# Patient Record
Sex: MALE | Race: WHITE | URBAN - METROPOLITAN AREA
[De-identification: names, ages, dates, MRNs, and addresses within clinical notes are randomized per-mention and may not be internally consistent; named-entity substitution may affect disease eponyms.]

---

## 2017-01-12 ENCOUNTER — APPOINTMENT (RX ONLY)
Dept: URBAN - METROPOLITAN AREA CLINIC 24 | Facility: CLINIC | Age: 67
Setting detail: DERMATOLOGY
End: 2017-01-12

## 2017-01-12 DIAGNOSIS — L82.1 OTHER SEBORRHEIC KERATOSIS: ICD-10-CM

## 2017-01-12 DIAGNOSIS — L57.0 ACTINIC KERATOSIS: ICD-10-CM

## 2017-01-12 DIAGNOSIS — Z85.828 PERSONAL HISTORY OF OTHER MALIGNANT NEOPLASM OF SKIN: ICD-10-CM

## 2017-01-12 PROCEDURE — 99212 OFFICE O/P EST SF 10 MIN: CPT | Mod: 25

## 2017-01-12 PROCEDURE — ? COUNSELING

## 2017-01-12 PROCEDURE — ? LIQUID NITROGEN

## 2017-01-12 PROCEDURE — 17004 DESTROY PREMAL LESIONS 15/>: CPT

## 2017-01-12 ASSESSMENT — LOCATION DETAILED DESCRIPTION DERM
LOCATION DETAILED: LEFT DISTAL DORSAL FOREARM
LOCATION DETAILED: RIGHT CENTRAL ZYGOMA
LOCATION DETAILED: LEFT SUPERIOR PREAURICULAR CHEEK
LOCATION DETAILED: RIGHT DISTAL RADIAL DORSAL FOREARM
LOCATION DETAILED: RIGHT LATERAL FOREHEAD
LOCATION DETAILED: LEFT SUPERIOR CRUS OF ANTIHELIX
LOCATION DETAILED: LEFT DORSAL INDEX METACARPOPHALANGEAL JOINT
LOCATION DETAILED: RIGHT SUPERIOR FOREHEAD
LOCATION DETAILED: RIGHT CENTRAL TEMPLE
LOCATION DETAILED: LEFT SUPERIOR FOREHEAD
LOCATION DETAILED: RIGHT SUPERIOR LATERAL NECK
LOCATION DETAILED: LEFT PROXIMAL DORSAL FOREARM
LOCATION DETAILED: LEFT MEDIAL FOREHEAD
LOCATION DETAILED: RIGHT PROXIMAL DORSAL FOREARM
LOCATION DETAILED: RIGHT FOREHEAD
LOCATION DETAILED: RIGHT SUPERIOR HELIX
LOCATION DETAILED: RIGHT MEDIAL INFERIOR CHEST
LOCATION DETAILED: LEFT INFERIOR FOREHEAD
LOCATION DETAILED: NASAL DORSUM
LOCATION DETAILED: LEFT SUPERIOR LATERAL MALAR CHEEK
LOCATION DETAILED: RIGHT RADIAL DORSAL HAND

## 2017-01-12 ASSESSMENT — LOCATION ZONE DERM
LOCATION ZONE: ARM
LOCATION ZONE: TRUNK
LOCATION ZONE: NOSE
LOCATION ZONE: HAND
LOCATION ZONE: NECK
LOCATION ZONE: EAR
LOCATION ZONE: FACE

## 2017-01-12 ASSESSMENT — LOCATION SIMPLE DESCRIPTION DERM
LOCATION SIMPLE: RIGHT ZYGOMA
LOCATION SIMPLE: LEFT FOREARM
LOCATION SIMPLE: LEFT FOREHEAD
LOCATION SIMPLE: LEFT CHEEK
LOCATION SIMPLE: RIGHT HAND
LOCATION SIMPLE: LEFT HAND
LOCATION SIMPLE: RIGHT EAR
LOCATION SIMPLE: RIGHT FOREHEAD
LOCATION SIMPLE: NOSE
LOCATION SIMPLE: NECK
LOCATION SIMPLE: RIGHT FOREARM
LOCATION SIMPLE: LEFT EAR
LOCATION SIMPLE: CHEST
LOCATION SIMPLE: RIGHT TEMPLE

## 2017-01-12 NOTE — PROCEDURE: LIQUID NITROGEN
Duration Of Freeze Thaw-Cycle (Seconds): 0
Post-Care Instructions: I reviewed with the patient in detail post-care instructions. Patient is to wear sunprotection, and avoid picking at any of the treated lesions. Pt may apply Vaseline to crusted or scabbing areas.
Detail Level: Detailed
Render Post-Care Instructions In Note?: no
Consent: The patient's consent was obtained including but not limited to risks of crusting, scabbing, blistering, scarring, darker or lighter pigmentary change, recurrence, incomplete removal and infection.

## 2017-01-12 NOTE — PROCEDURE: MIPS QUALITY
Quality 111:Pneumonia Vaccination Status For Older Adults: Pneumococcal Vaccination Previously Received
Quality 130: Documentation Of Current Medications In The Medical Record: Current Medications Documented
Detail Level: Detailed
Quality 110: Preventive Care And Screening: Influenza Immunization: Influenza Immunization previously received during influenza season

## 2017-06-26 ENCOUNTER — HOSPITAL ENCOUNTER (OUTPATIENT)
Dept: ULTRASOUND IMAGING | Age: 67
Discharge: HOME | End: 2017-06-26
Attending: FAMILY MEDICINE
Payer: MEDICARE

## 2017-06-26 DIAGNOSIS — N50.89 TESTICULAR MASS: ICD-10-CM

## 2017-06-26 PROCEDURE — 76870 US EXAM SCROTUM: CPT

## 2017-08-03 ENCOUNTER — APPOINTMENT (RX ONLY)
Dept: URBAN - METROPOLITAN AREA CLINIC 24 | Facility: CLINIC | Age: 67
Setting detail: DERMATOLOGY
End: 2017-08-03

## 2017-08-03 DIAGNOSIS — L82.1 OTHER SEBORRHEIC KERATOSIS: ICD-10-CM

## 2017-08-03 DIAGNOSIS — L57.0 ACTINIC KERATOSIS: ICD-10-CM

## 2017-08-03 DIAGNOSIS — L81.4 OTHER MELANIN HYPERPIGMENTATION: ICD-10-CM

## 2017-08-03 DIAGNOSIS — D18.0 HEMANGIOMA: ICD-10-CM

## 2017-08-03 DIAGNOSIS — Z85.828 PERSONAL HISTORY OF OTHER MALIGNANT NEOPLASM OF SKIN: ICD-10-CM

## 2017-08-03 PROBLEM — D18.01 HEMANGIOMA OF SKIN AND SUBCUTANEOUS TISSUE: Status: ACTIVE | Noted: 2017-08-03

## 2017-08-03 PROCEDURE — 99213 OFFICE O/P EST LOW 20 MIN: CPT | Mod: 25

## 2017-08-03 PROCEDURE — ? LIQUID NITROGEN

## 2017-08-03 PROCEDURE — ? COUNSELING

## 2017-08-03 PROCEDURE — 17004 DESTROY PREMAL LESIONS 15/>: CPT

## 2017-08-03 ASSESSMENT — LOCATION SIMPLE DESCRIPTION DERM
LOCATION SIMPLE: SUPERIOR FOREHEAD
LOCATION SIMPLE: LEFT SHOULDER
LOCATION SIMPLE: CHEST
LOCATION SIMPLE: POSTERIOR SCALP
LOCATION SIMPLE: LEFT TEMPLE
LOCATION SIMPLE: LEFT EAR
LOCATION SIMPLE: RIGHT SHOULDER
LOCATION SIMPLE: SCALP
LOCATION SIMPLE: RIGHT UPPER BACK
LOCATION SIMPLE: NOSE
LOCATION SIMPLE: LEFT UPPER BACK
LOCATION SIMPLE: RIGHT FOREHEAD
LOCATION SIMPLE: LEFT OCCIPITAL SCALP
LOCATION SIMPLE: RIGHT CHEEK
LOCATION SIMPLE: LEFT ZYGOMA
LOCATION SIMPLE: LEFT FOREHEAD
LOCATION SIMPLE: ABDOMEN
LOCATION SIMPLE: RIGHT ZYGOMA
LOCATION SIMPLE: NECK
LOCATION SIMPLE: LEFT CHEEK

## 2017-08-03 ASSESSMENT — LOCATION ZONE DERM
LOCATION ZONE: NECK
LOCATION ZONE: SCALP
LOCATION ZONE: NOSE
LOCATION ZONE: EAR
LOCATION ZONE: TRUNK
LOCATION ZONE: FACE
LOCATION ZONE: ARM

## 2017-08-03 ASSESSMENT — LOCATION DETAILED DESCRIPTION DERM
LOCATION DETAILED: LEFT SUPERIOR HELIX
LOCATION DETAILED: LEFT CENTRAL ZYGOMA
LOCATION DETAILED: SUPERIOR MID FOREHEAD
LOCATION DETAILED: LEFT INFERIOR MEDIAL UPPER BACK
LOCATION DETAILED: RIGHT INFERIOR MEDIAL FOREHEAD
LOCATION DETAILED: EPIGASTRIC SKIN
LOCATION DETAILED: LEFT SUPERIOR OCCIPITAL SCALP
LOCATION DETAILED: LEFT POSTERIOR SHOULDER
LOCATION DETAILED: NASAL DORSUM
LOCATION DETAILED: RIGHT MEDIAL ZYGOMA
LOCATION DETAILED: RIGHT SUPERIOR FRONTAL SCALP
LOCATION DETAILED: RIGHT SUPERIOR OCCIPITAL SCALP
LOCATION DETAILED: RIGHT MID-UPPER BACK
LOCATION DETAILED: RIGHT SUPERIOR CENTRAL MALAR CHEEK
LOCATION DETAILED: POSTERIOR MID-PARIETAL SCALP
LOCATION DETAILED: LEFT MID PREAURICULAR CHEEK
LOCATION DETAILED: RIGHT SUPERIOR FOREHEAD
LOCATION DETAILED: RIGHT SUPERIOR LATERAL NECK
LOCATION DETAILED: RIGHT POSTERIOR SHOULDER
LOCATION DETAILED: LEFT SUPERIOR FOREHEAD
LOCATION DETAILED: LEFT MEDIAL TEMPLE
LOCATION DETAILED: RIGHT SUPERIOR PREAURICULAR CHEEK
LOCATION DETAILED: LEFT INFERIOR CENTRAL MALAR CHEEK
LOCATION DETAILED: RIGHT MEDIAL INFERIOR CHEST
LOCATION DETAILED: RIGHT LATERAL ZYGOMA
LOCATION DETAILED: LEFT SUPERIOR PARIETAL SCALP
LOCATION DETAILED: LEFT SUPERIOR MEDIAL FOREHEAD
LOCATION DETAILED: LEFT CENTRAL MALAR CHEEK

## 2018-02-13 ENCOUNTER — APPOINTMENT (RX ONLY)
Dept: URBAN - METROPOLITAN AREA CLINIC 24 | Facility: CLINIC | Age: 68
Setting detail: DERMATOLOGY
End: 2018-02-13

## 2018-02-13 DIAGNOSIS — L82.1 OTHER SEBORRHEIC KERATOSIS: ICD-10-CM

## 2018-02-13 DIAGNOSIS — L50.3 DERMATOGRAPHIC URTICARIA: ICD-10-CM

## 2018-02-13 DIAGNOSIS — D485 NEOPLASM OF UNCERTAIN BEHAVIOR OF SKIN: ICD-10-CM

## 2018-02-13 DIAGNOSIS — L81.4 OTHER MELANIN HYPERPIGMENTATION: ICD-10-CM

## 2018-02-13 DIAGNOSIS — Z85.828 PERSONAL HISTORY OF OTHER MALIGNANT NEOPLASM OF SKIN: ICD-10-CM

## 2018-02-13 DIAGNOSIS — D18.0 HEMANGIOMA: ICD-10-CM

## 2018-02-13 PROBLEM — D48.5 NEOPLASM OF UNCERTAIN BEHAVIOR OF SKIN: Status: ACTIVE | Noted: 2018-02-13

## 2018-02-13 PROBLEM — D18.01 HEMANGIOMA OF SKIN AND SUBCUTANEOUS TISSUE: Status: ACTIVE | Noted: 2018-02-13

## 2018-02-13 PROBLEM — L29.8 OTHER PRURITUS: Status: ACTIVE | Noted: 2018-02-13

## 2018-02-13 PROCEDURE — ? TREATMENT REGIMEN

## 2018-02-13 PROCEDURE — ? COUNSELING

## 2018-02-13 PROCEDURE — 11100: CPT

## 2018-02-13 PROCEDURE — 99213 OFFICE O/P EST LOW 20 MIN: CPT | Mod: 25

## 2018-02-13 PROCEDURE — ? BIOPSY BY SHAVE METHOD

## 2018-02-13 ASSESSMENT — LOCATION SIMPLE DESCRIPTION DERM
LOCATION SIMPLE: LEFT UPPER BACK
LOCATION SIMPLE: RIGHT SHOULDER
LOCATION SIMPLE: NECK
LOCATION SIMPLE: NOSE
LOCATION SIMPLE: RIGHT UPPER BACK
LOCATION SIMPLE: CHEST
LOCATION SIMPLE: TRAPEZIAL NECK
LOCATION SIMPLE: LEFT SHOULDER
LOCATION SIMPLE: ABDOMEN

## 2018-02-13 ASSESSMENT — LOCATION DETAILED DESCRIPTION DERM
LOCATION DETAILED: RIGHT SUPERIOR LATERAL NECK
LOCATION DETAILED: RIGHT INFERIOR MEDIAL UPPER BACK
LOCATION DETAILED: RIGHT POSTERIOR SHOULDER
LOCATION DETAILED: MID TRAPEZIAL NECK
LOCATION DETAILED: NASAL DORSUM
LOCATION DETAILED: EPIGASTRIC SKIN
LOCATION DETAILED: LEFT POSTERIOR SHOULDER
LOCATION DETAILED: RIGHT INFERIOR UPPER BACK
LOCATION DETAILED: LEFT MID-UPPER BACK
LOCATION DETAILED: RIGHT MEDIAL INFERIOR CHEST

## 2018-02-13 ASSESSMENT — LOCATION ZONE DERM
LOCATION ZONE: ARM
LOCATION ZONE: NOSE
LOCATION ZONE: TRUNK
LOCATION ZONE: NECK

## 2018-02-13 NOTE — PROCEDURE: BIOPSY BY SHAVE METHOD
Type Of Destruction Used: Curettage
Additional Anesthesia Volume In Cc (Will Not Render If 0): 0
Billing Type: Third-Party Bill
Consent: Written consent was obtained and risks were reviewed including but not limited to scarring, infection, bleeding, scabbing, incomplete removal, and allergy to anesthesia.
Dressing: bandage
Electrodesiccation Text: The wound bed was treated with electrodesiccation after the biopsy was performed.
Biopsy Type: H and E
Bill For Surgical Tray: no
Anesthesia Type: 1% lidocaine with epinephrine
Wound Care: Vaseline
Biopsy Method: Dermablade
Hemostasis: Aluminum Chloride
Cryotherapy Text: The wound bed was treated with cryotherapy after the biopsy was performed.
Accession #: [id number]
Notification Instructions: Patient will be notified of biopsy results. However, patient instructed to call the office if not contacted within 2 weeks.
Post-care instructions were reviewed in detail and written instructions are provided. Patient is to keep the biopsy site dry overnight, and then apply bacitracin twice daily until healed. Patient may apply hydrogen peroxide soaks to remove any crusting.
Electrodesiccation And Curettage Text: The wound bed was treated with electrodesiccation and curettage after the biopsy was performed.
Silver Nitrate Text: The wound bed was treated with silver nitrate after the biopsy was performed.
Anesthesia Volume In Cc: 0.3
Detail Level: Detailed

## 2018-02-13 NOTE — PROCEDURE: TREATMENT REGIMEN
Otc Regimen: Claritin QAM & Zyrtec QHS x 2 weeks. Discontinue the Zyrtec and continue the Claritin x 2 weeks. If not cleared up or rash returns we will refer to allergist.
Detail Level: Detailed

## 2018-08-14 ENCOUNTER — APPOINTMENT (RX ONLY)
Dept: URBAN - METROPOLITAN AREA CLINIC 24 | Facility: CLINIC | Age: 68
Setting detail: DERMATOLOGY
End: 2018-08-14

## 2018-08-14 DIAGNOSIS — D485 NEOPLASM OF UNCERTAIN BEHAVIOR OF SKIN: ICD-10-CM

## 2018-08-14 DIAGNOSIS — Z85.828 PERSONAL HISTORY OF OTHER MALIGNANT NEOPLASM OF SKIN: ICD-10-CM

## 2018-08-14 DIAGNOSIS — L81.4 OTHER MELANIN HYPERPIGMENTATION: ICD-10-CM

## 2018-08-14 DIAGNOSIS — L57.0 ACTINIC KERATOSIS: ICD-10-CM

## 2018-08-14 DIAGNOSIS — L82.1 OTHER SEBORRHEIC KERATOSIS: ICD-10-CM

## 2018-08-14 PROBLEM — L55.1 SUNBURN OF SECOND DEGREE: Status: ACTIVE | Noted: 2018-08-14

## 2018-08-14 PROBLEM — D48.5 NEOPLASM OF UNCERTAIN BEHAVIOR OF SKIN: Status: ACTIVE | Noted: 2018-08-14

## 2018-08-14 PROCEDURE — 11100: CPT | Mod: 59

## 2018-08-14 PROCEDURE — ? OTHER

## 2018-08-14 PROCEDURE — 99214 OFFICE O/P EST MOD 30 MIN: CPT | Mod: 25

## 2018-08-14 PROCEDURE — 17004 DESTROY PREMAL LESIONS 15/>: CPT

## 2018-08-14 PROCEDURE — ? LIQUID NITROGEN

## 2018-08-14 PROCEDURE — ? COUNSELING

## 2018-08-14 PROCEDURE — ? BIOPSY BY SHAVE METHOD

## 2018-08-14 ASSESSMENT — LOCATION SIMPLE DESCRIPTION DERM
LOCATION SIMPLE: NOSE
LOCATION SIMPLE: RIGHT ZYGOMA
LOCATION SIMPLE: SCALP
LOCATION SIMPLE: NECK
LOCATION SIMPLE: LEFT HAND
LOCATION SIMPLE: RIGHT CHEEK
LOCATION SIMPLE: LEFT TEMPLE
LOCATION SIMPLE: LEFT OCCIPITAL SCALP
LOCATION SIMPLE: LEFT CHEEK
LOCATION SIMPLE: ABDOMEN
LOCATION SIMPLE: LEFT ZYGOMA
LOCATION SIMPLE: RIGHT UPPER BACK
LOCATION SIMPLE: LEFT FOREHEAD
LOCATION SIMPLE: LEFT EAR
LOCATION SIMPLE: RIGHT SHOULDER
LOCATION SIMPLE: LEFT SCALP
LOCATION SIMPLE: LEFT SHOULDER
LOCATION SIMPLE: RIGHT FOREHEAD
LOCATION SIMPLE: CHEST
LOCATION SIMPLE: RIGHT HAND

## 2018-08-14 ASSESSMENT — LOCATION DETAILED DESCRIPTION DERM
LOCATION DETAILED: RIGHT MEDIAL INFERIOR CHEST
LOCATION DETAILED: LEFT POSTERIOR SHOULDER
LOCATION DETAILED: LEFT SUPERIOR OCCIPITAL SCALP
LOCATION DETAILED: RIGHT MEDIAL ZYGOMA
LOCATION DETAILED: EPIGASTRIC SKIN
LOCATION DETAILED: LEFT RADIAL DORSAL HAND
LOCATION DETAILED: LEFT SUPERIOR HELIX
LOCATION DETAILED: RIGHT SUPERIOR PREAURICULAR CHEEK
LOCATION DETAILED: NASAL DORSUM
LOCATION DETAILED: RIGHT RADIAL DORSAL HAND
LOCATION DETAILED: RIGHT SUPERIOR MEDIAL MALAR CHEEK
LOCATION DETAILED: LEFT SUPERIOR PARIETAL SCALP
LOCATION DETAILED: LEFT SUPERIOR CENTRAL MALAR CHEEK
LOCATION DETAILED: RIGHT SUPERIOR FOREHEAD
LOCATION DETAILED: LEFT MEDIAL ZYGOMA
LOCATION DETAILED: RIGHT POSTERIOR SHOULDER
LOCATION DETAILED: RIGHT CENTRAL PARIETAL SCALP
LOCATION DETAILED: RIGHT MID-UPPER BACK
LOCATION DETAILED: RIGHT FOREHEAD
LOCATION DETAILED: LEFT FOREHEAD
LOCATION DETAILED: RIGHT SUPERIOR LATERAL NECK
LOCATION DETAILED: RIGHT SUPERIOR PARIETAL SCALP
LOCATION DETAILED: RIGHT INFERIOR UPPER BACK
LOCATION DETAILED: LEFT MEDIAL FRONTAL SCALP
LOCATION DETAILED: LEFT INFERIOR PREAURICULAR CHEEK
LOCATION DETAILED: LEFT CENTRAL TEMPLE

## 2018-08-14 ASSESSMENT — LOCATION ZONE DERM
LOCATION ZONE: NOSE
LOCATION ZONE: SCALP
LOCATION ZONE: EAR
LOCATION ZONE: HAND
LOCATION ZONE: NECK
LOCATION ZONE: TRUNK
LOCATION ZONE: FACE
LOCATION ZONE: ARM

## 2018-08-14 NOTE — PROCEDURE: OTHER
Detail Level: Simple
Other (Free Text): Specimen(s) sent to [facility] for testing
Note Text (......Xxx Chief Complaint.): This diagnosis correlates with the

## 2018-08-23 ENCOUNTER — HOSPITAL ENCOUNTER (OUTPATIENT)
Dept: PHYSICAL THERAPY | Age: 68
Discharge: HOME | End: 2018-08-23
Payer: MEDICARE

## 2018-08-23 NOTE — THERAPY EVALUATION
Mikayla Pruitt  : 1950  [place]  [place] [address] [place] [address] at . Kari Bernabe [address]  [facility], [address]  [phone]   [phone]         OUTPATIENT PHYSICAL THERAPY:Initial Assessment 2018    ICD-10: Treatment Diagnosis:  Pain in left knee (M25.562)  Osteoarthritis of knee, unspecified (M17.9)  Stiffness of left knee, not elsewhere classified (M25.662)  Effusion, left knee (M25.462)    Precautions/Allergies:   Lisinopril; Losartan; and Statins-hmg-coa reductase inhibitors   Fall Risk Score: 1 (? 5 = High Risk)  MD Orders: Eval and Treat MEDICAL/REFERRING DIAGNOSIS:  Unilateral primary osteoarthritis, left knee [M17.12]  DATE OF ONSET: three year history  REFERRING PHYSICIAN: Sergio Montana., *  RETURN PHYSICIAN APPOINTMENT: to be determined     INITIAL ASSESSMENT:  Mr. Twan Henderson presents to physical therapy with decreased strength, ROM, joint mobility, functional mobility . These S/S are consistent with left knee osteoarthritis. Patient will benefit from skilled physical therapy for manual therapeutic techniques (as appropriate), therapeutic exercises and activities, balance and comprehensive home exercises program to address current impairments and functional limitations. Mikayla Pruitt will benefit from skilled PT (medically necessary) in order to address above deficits affecting participation in basic ADLs and overall functional tolerance. PROBLEM LIST (Impacting functional limitations):   1. Decreased Strength  2. Decreased ADL/Functional Activities  3. Decreased Transfer Abilities  4. Decreased Ambulation Ability/Technique  5. Decreased Balance  6. Increased Pain  7. Decreased Activity Tolerance  8. Decreased [place] with Home Exercise Program INTERVENTIONS PLANNED:  1. Balance Exercise  2. Family Education  3. Gait Training  4. Home Exercise Program (HEP)  5.  Manual Therapy  6. Neuromuscular Re-education/Strengthening  7. Range of Motion (ROM)  8. Therapeutic Activites  9. Therapeutic Exercise/Strengthening  10. Transfer Training  11. TREATMENT PLAN:  Effective Dates: 8/23/2018 TO 9/22/2018 (30 days). Frequency/Duration: 2 times a week for 30 Days    GOALS: (Goals have been discussed and agreed upon with patient.)  Short-Term Functional Goals: Time Frame: 2 weeks  1. Dwight Abad will be independent with HEP for strength and ROM  2. Dwight Abad will tolerate manual therapy/joint mobilizations to increase knee flexion ROM so pt can ambulate stairs and walk with a more normalized gait pattern. [address] [place] [address] will participate in LE strengthening exercises for hip, knee, ankle with weight as appropriate for 3 sets of 10.  [address] [place] [address] will tolerate scar massage as appropriate to improve tissue mobility with patient to perform independently after education  5. Dwight Abad will participate in static and dynamic balance activities for 5 minutes to help improve proprioception and decrease risk of falls  6. Dwight Abad to increase lower extremity functional scale by 10 points to show improvement in areas of difficulty  7. Dwight Abad will demonstrate left knee flexion >=105 degrees to improve functional mobility and tolerance of ADLs. [address] [place] [address] will demonstrate L knee extension >= 5 degrees to improve functional mobility and tolerance of ADLs  9. Dwight Abad will improve MMTL LE to >=4+/5 to improve current level of independence and community reintegration. Long-Term Goals: Time Frame:6   1. Dwight Abad will be independent in HEP of stretching and strengthening   2.  Dwight Abad will ascend/descend 12 steps with reciprocal gait pattern and rail   [address] [place] [address] to increase lower extremity functional scale by15 points to show improvement in areas of difficulty     Rehabilitation Potential For Stated Goals: Jerrold Simmonds [facility] therapy, I certify that the treatment plan above will be carried out by a therapist or under their direction. Thank you for this referral,  Grace Hernandez, RT     Referring Physician Signature: Carol Zazueta., *              Date                  HISTORY:   History of Present Injury/Illness (Reason for Referral):  Patient presents with three year history of worsening left knee pain, stiffness, swelling and lack of function. Is planning to have a total knee arthroplasty in the next four months. He is here for pre-op exercise to improve range of motion, strength and overall function. Mr Mateus Menchaca also is complaining of left hip pain during walking.  -Present Symptoms (on day of evaluation):  Knee stiffness, swelling, and decreased walking ability. Pain;   · Currently: 5/10  · Best: 4/10  · Worst: 9/10  · Aggravating factors: walking, standing, squatting, climbing stairs. · Relieving factors: rest and medication. Past Medical History/Comorbidities:   Mr. Mateus Menchaca  has a past medical history of Arthritis; CAD (coronary artery disease); Chest pain, unspecified (8/31/2012); Hyperlipidemia; Hypertension (8/31/2012); Mitral valve regurgitation; NSTEMI (non-ST elevated myocardial infarction) ([address] (9/2/2012); Post PTCA; Varicose veins of both lower extremities (7/6/2016); and Varicose veins of legs. He also has no past medical history of Chronic kidney disease or Stroke ([address]   Mr. Mateus Menchaca  has a past surgical history that includes pr cardiac surg procedure unlist (2012); hx knee arthroscopy; hx other surgical; and hx other surgical.    Active Ambulatory Problems     Diagnosis Date Noted    Hypertension 08/31/2012    CAD (coronary artery disease) 09/02/2012    Hyperlipidemia     Mitral valve regurgitation     Varicose veins of both lower extremities 07/06/2016     Resolved Ambulatory Problems     Diagnosis Date Noted    NSTEMI (non-ST elevated myocardial infarction) ([address] 09/02/2012    Post PTCA Past Medical History:   Diagnosis Date    Arthritis     CAD (coronary artery disease)     Chest pain, unspecified 8/31/2012    Hyperlipidemia     Hypertension 8/31/2012    Mitral valve regurgitation     [facility] 9/2/2012    Post PTCA     Varicose veins of both lower extremities 7/6/2016    Varicose veins of legs      Social History/Living Environment:        Social History     Social History    Marital status:      Spouse name: N/A    Number of children: N/A    Years of education: N/A     Occupational History    Not on file. Social History Main Topics    Smoking status: Former Smoker     Quit date: 10/5/1981    Smokeless tobacco: Never Used    Alcohol use Yes      Comment: occ    Drug use: No    Sexual activity: Not on file     Other Topics Concern    Not on file     Social History Narrative     Prior Level of Function/Work/Activity:  Retired . Previous Treatment Approach  Antiinflammatories, joint injections    Current Medications:    Current Outpatient Prescriptions:     b complex vitamins tablet, Take 1 Tab by mouth daily. , Disp: , Rfl:     Cetirizine (ZYRTEC) 10 mg cap, Take  by mouth., Disp: , Rfl:     amLODIPine (NORVASC) 5 mg tablet, Take 1 Tab by mouth as needed. , Disp: 90 Tab, Rfl: 3    nitroglycerin (NITROSTAT) 0.4 mg SL tablet, 1 Tab by SubLINGual route every five (5) minutes as needed for Chest Pain., Disp: 25 Tab, Rfl: 5    magnesium 250 mg tab, Take  by mouth., Disp: , Rfl:     AMINO ACIDS/MV,IRON,MIN (OCUVITE EXTRA PO), Take  by mouth., Disp: , Rfl:     coenzyme Q-10 (CO Q-10) 200 mg capsule, Take  by mouth daily. 2qd, Disp: , Rfl:     aspirin 81 mg chewable tablet, Take 1 Tab by mouth daily. , Disp: , Rfl:      Date Last Reviewed:  8/24/2018   Number of Personal Factors/Comorbidities that affect the Plan of Care: 1-2: MODERATE COMPLEXITY   EXAMINATION:   Observation/Orthostatic Postural Assessment: Gait:  Antalgic, stiff knee gait, uses straight cane. AROM/PROM         Joint: Eval Date:   8/23/18  Re-Assess Date:  Re-Assess Date:    Active ROM RIGHT LEFT RIGHT LEFT RIGHT LEFT   Knee Extension 0 -20       Knee Flexion 133 90       Hip Flexion         Ankle mobility                                             Passive ROM         Knee Extension 0 -10       Knee Flexion 135 100         Strength:     Eval Date:  8/23/18  Re-Assess Date:  Re-Assess Date:      RIGHT LEFT RIGHT LEFT RIGHT LEFT   Knee Flexion 5/5 3+/5       Knee Extension 5/5 3+/5       Hip Flexion 4/5 4/5       Hip Abduction 4/5 3+/5       Hip Extension 3+/5 3+/5       Ankle Dorsiflexion                        Joint Mobility Eval Date:  Re-Assess Date:  Re-Assess Date:     RIGHT LEFT RIGHT LEFT RIGHT LEFT   Tibiofemoral  hypomobility       Patellofemoral  hypomobility       Tibiofibular         Talocrual               Neurological Screen:  No radiating symptoms down leg  Functional Mobility: Limited tolerance of walking and standing. TUG test: 13.30 sec  Sit to Stand= keeps left leg extended but able to stand without use of hands. Squat= limtied by 50% decreased weight bearing left LE, decreased knee flexion. Balance:    Single leg stance: R= 30 sec / L=2 sec   Body Structures Involved:  1. Joints  2. Muscles  3. Ligaments Body Functions Affected:  1. Sensory/Pain  2. Neuromusculoskeletal  3. Movement Related Activities and Participation Affected:  1. Mobility  2. Self Care   Number of elements that affect the Plan of Care: 4+: HIGH COMPLEXITY   CLINICAL PRESENTATION:   Presentation: Evolving clinical presentation with changing clinical characteristics: MODERATE COMPLEXITY   CLINICAL DECISION MAKING:   Outcome Measure:    Tool Used: Lower Extremity Functional Scale (LEFS)  Score:  Initial: 31/80 Most Recent: X/80 (Date: -- )   Interpretation of Score: 20 questions each scored on a 5 point scale with 0 representing \"extreme difficulty or unable to perform\" and 4 representing \"no difficulty\". The lower the score, the greater the functional disability. 80/80 represents no disability. Minimal detectable change is 9 points. Score 80 79-63 62-48 47-32 31-16 15-1 0   Modifier CH CI CJ CK CL CM CN     ? Mobility - Walking and Moving Around:     - CURRENT STATUS: CL - 60%-79% impaired, limited or restricted    - GOAL STATUS: CK - 40%-59% impaired, limited or restricted    - D/C STATUS:  ---------------To be determined---------------    Medical Necessity:   · Skilled intervention continues to be required due to current impairment. Reason for Services/Other Comments:  · Patient continues to require skilled intervention due to medical complications and patient unable to attend/participate in therapy as expected. Use of outcome tool(s) and clinical judgement create a POC that gives a: Questionable prediction of patient's progress: MODERATE COMPLEXITY   TREATMENT:   (In addition to Assessment/Re-Assessment sessions the following treatments were rendered)    · Pre-Treatment Pain/ Symptoms: See above report in Initial Assessment   5/10       THERAPEUTIC EXERCISE: (30 minutes):  Exercises per grid below to improve mobility, strength and balance. Required minimal visual and verbal cues to promote proper body alignment, promote proper body posture and promote proper body mechanics. Progressed resistance, range and repetitions as indicated. Date:  11/14/17 Date:   Date:     Activity/Exercise Parameters Parameters Parameters   Patient education Treatment expectations, pre/post op expectations, HEP     Heel slide 10 x     Quad sets 10 x     SAQ 2 x 10     SLR 2 x 10      Knee extension stretch 2 min     Nu step 8 min       [email or web address] [facility]      Therapeutic Activity: ( minutes):      Activity Date:   Date:   Date:     Exercise Parameters Parameters Parameters                                              Neuromuscular Education: ( Minutes):     Manual Therapy Interventions: ( minutes): Manual interventions per grid below performed to improve joint/soft tissue mobility and ROM to improve ability to perform ADLs. Patient responded well to all manual interventions with no significant increase in pain/symptoms . Improved ROM demonstrated following interventions and decrease in pain reported below. Manual Intervention Date:   Date:   Date:     Soft tissue mobilization Joint Mobility Parameters Parameters Parameters                                                      Modalities: ( minutes):       Treatment/Session Assessment:  Verbalized understanding of HEP and role of PT/POC. · Post-Treatment Pain/ Symptoms:  4  4/10           ·   Compliance with Program/Exercises: Will assess as treatment progresses. · Recommendations/Intent for next treatment session: \"Next visit will focus on advancements to more challenging activities and reduction in assistance provided\".     Future Appointments  Date Time Provider Tennille Stanford   8/28/2018 8:15 AM Ovi Soria RT [facility]   8/30/2018 3:00 PM Vonda Soria RT [facility]   2/25/2019 9:30 AM Stanley Oswald MD [facility]   30 min therapeutic ex, 15 min evaluation    Total Treatment Duration:  PT Patient Time In/Time Out  Time In: 1600  Time Out: 2491    Nikki Soria, RT

## 2018-08-24 NOTE — PROGRESS NOTES
Ambulatory/Rehab Services H2 Model Falls Risk Assessment    Risk Factor Pts. ·   Confusion/Disorientation/Impulsivity  []    4 ·   Symptomatic Depression  []   2 ·   Altered Elimination  []   1 ·   Dizziness/Vertigo  []   1 ·   Gender (Male)     1 ·   Any administered antiepileptics (anticonvulsants):  []   2 ·   Any administered benzodiazepines:  []   1 ·   Visual Impairment (specify):  []   1 ·   Portable Oxygen Use  []   1 ·   Orthostatic ? BP  []   1 ·   History of Recent Falls (within 3 mos.)  []   5     Ability to Rise from Chair (choose one) Pts. ·   Ability to rise in a single movement  [x]   0 ·   Pushes up, successful in one attempt  []   1 ·   Multiple attempts, but successful  []   3 ·   Unable to rise without assistance  []   4   Total: (5 or greater = High Risk) 1     Falls Prevention Plan:   []                Physical Limitations to Exercise (specify):   []                Mobility Assistance Device (type):   []                Exercise/Equipment Adaptation (specify):    ©2010 [facility] [place] Patent #[id number].  Federal Law prohibits the replication, distribution or use without written permission from [facility] [employer]
ADMIT

## 2018-08-28 ENCOUNTER — HOSPITAL ENCOUNTER (OUTPATIENT)
Dept: PHYSICAL THERAPY | Age: 68
Discharge: HOME | End: 2018-08-28
Payer: MEDICARE

## 2018-08-28 PROCEDURE — 97110 THERAPEUTIC EXERCISES: CPT

## 2018-08-28 NOTE — PROGRESS NOTES
Tian Olsen  : 1950  [place]  Secondary: [place] [address] [facility] at . Kari Bernabe 37  [address], [address]  [phone]   [phone]         OUTPATIENT PHYSICAL THERAPY:Daily Note 2018    ICD-10: Treatment Diagnosis:  Pain in left knee (M25.562)  Osteoarthritis of knee, unspecified (M17.9)  Stiffness of left knee, not elsewhere classified (M25.662)  Effusion, left knee (M25.462)    Precautions/Allergies:   Lisinopril; Losartan; and Statins-hmg-coa reductase inhibitors   Fall Risk Score: 1 (? 5 = High Risk)  MD Orders: Eval and Treat MEDICAL/REFERRING DIAGNOSIS:  Unilateral primary osteoarthritis, left knee [M17.12]  DATE OF ONSET: three year history  REFERRING PHYSICIAN: Yuko Hutson., *  RETURN PHYSICIAN APPOINTMENT: to be determined     INITIAL ASSESSMENT:  Mr. Felix Merino presents to physical therapy with decreased strength, ROM, joint mobility, functional mobility . These S/S are consistent with left knee osteoarthritis. Patient will benefit from skilled physical therapy for manual therapeutic techniques (as appropriate), therapeutic exercises and activities, balance and comprehensive home exercises program to address current impairments and functional limitations. Tian Olsen will benefit from skilled PT (medically necessary) in order to address above deficits affecting participation in basic ADLs and overall functional tolerance. PROBLEM LIST (Impacting functional limitations):   1. Decreased Strength  2. Decreased ADL/Functional Activities  3. Decreased Transfer Abilities  4. Decreased Ambulation Ability/Technique  5. Decreased Balance  6. Increased Pain  7. Decreased Activity Tolerance  8. Decreased [place] with Home Exercise Program INTERVENTIONS PLANNED:  1. Balance Exercise  2. Family Education  3. Gait Training  4. Home Exercise Program (HEP)  5. Manual Therapy  6.  Neuromuscular Re-education/Strengthening  7. Range of Motion (ROM)  8. Therapeutic Activites  9. Therapeutic Exercise/Strengthening  10. Transfer Training  11. TREATMENT PLAN:  Effective Dates: 8/23/2018 TO 9/22/2018 (30 days). Frequency/Duration: 2 times a week for 30 Days    GOALS: (Goals have been discussed and agreed upon with patient.)  Short-Term Functional Goals: Time Frame: 2 weeks  1. James Zavala will be independent with HEP for strength and ROM  2. James Zavala will tolerate manual therapy/joint mobilizations to increase knee flexion ROM so pt can ambulate stairs and walk with a more normalized gait pattern. [address] [place] [address] will participate in LE strengthening exercises for hip, knee, ankle with weight as appropriate for 3 sets of 10.  [address] [place] [address] will tolerate scar massage as appropriate to improve tissue mobility with patient to perform independently after education  5. James Zavala will participate in static and dynamic balance activities for 5 minutes to help improve proprioception and decrease risk of falls  6. [facility] to increase lower extremity functional scale by 10 points to show improvement in areas of difficulty  7. [facility] will demonstrate left knee flexion >=105 degrees to improve functional mobility and tolerance of ADLs. [address] [place] [address] will demonstrate L knee extension >= 5 degrees to improve functional mobility and tolerance of ADLs  9. James Zavala will improve MMTL LE to >=4+/5 to improve current level of independence and community reintegration. Long-Term Goals: Time Frame:6   1. James Zavala will be independent in HEP of stretching and strengthening   2.  James Zavala will ascend/descend 12 steps with reciprocal gait pattern and rail   [address] [place] [address] to increase lower extremity functional scale by15 points to show improvement in areas of difficulty     Rehabilitation Potential For Stated Goals: Good  Regarding [facility] therapy, I certify that the treatment plan above will be carried out by a therapist or under their direction. Thank you for this referral,  Winston Juarez, RT     Referring Physician Signature: Kristina Garza., *              Date                  HISTORY:   History of Present Injury/Illness (Reason for Referral):  Patient presents with three year history of worsening left knee pain, stiffness, swelling and lack of function. Is planning to have a total knee arthroplasty in the next four months. He is here for pre-op exercise to improve range of motion, strength and overall function. Mr Digna Tavarez also is complaining of left hip pain during walking.  -Present Symptoms (on day of evaluation):  Knee stiffness, swelling, and decreased walking ability. Pain;   · Currently: 5/10  · Best: 4/10  · Worst: 9/10  · Aggravating factors: walking, standing, squatting, climbing stairs. · Relieving factors: rest and medication. Past Medical History/Comorbidities:   Mr. Digna Tavarez  has a past medical history of Arthritis; CAD (coronary artery disease); Chest pain, unspecified (8/31/2012); Hyperlipidemia; Hypertension (8/31/2012); Mitral valve regurgitation; NSTEMI (non-ST elevated myocardial infarction) ([address] (9/2/2012); Post PTCA; Varicose veins of both lower extremities (7/6/2016); and Varicose veins of legs. He also has no past medical history of Chronic kidney disease or Stroke ([address]   Mr. Digna Tavarez  has a past surgical history that includes pr cardiac surg procedure unlist (2012); hx knee arthroscopy; hx other surgical; and hx other surgical.    Active Ambulatory Problems     Diagnosis Date Noted    Hypertension 08/31/2012    CAD (coronary artery disease) 09/02/2012    Hyperlipidemia     Mitral valve regurgitation     Varicose veins of both lower extremities 07/06/2016     Resolved Ambulatory Problems     Diagnosis Date Noted    NSTEMI (non-ST elevated myocardial infarction) ([address] 09/02/2012    Post PTCA      Past Medical History:   Diagnosis Date    Arthritis     CAD (coronary artery disease)     Chest pain, unspecified 8/31/2012    Hyperlipidemia     Hypertension 8/31/2012    Mitral valve regurgitation     [facility] 9/2/2012    Post PTCA     Varicose veins of both lower extremities 7/6/2016    Varicose veins of legs      Social History/Living Environment:        Social History     Social History    Marital status:      Spouse name: N/A    Number of children: N/A    Years of education: N/A     Occupational History    Not on file. Social History Main Topics    Smoking status: Former Smoker     Quit date: 10/5/1981    Smokeless tobacco: Never Used    Alcohol use Yes      Comment: occ    Drug use: No    Sexual activity: Not on file     Other Topics Concern    Not on file     Social History Narrative     Prior Level of Function/Work/Activity:  Retired . Previous Treatment Approach  Antiinflammatories, joint injections    Current Medications:    Current Outpatient Prescriptions:     b complex vitamins tablet, Take 1 Tab by mouth daily. , Disp: , Rfl:     Cetirizine (ZYRTEC) 10 mg cap, Take  by mouth., Disp: , Rfl:     amLODIPine (NORVASC) 5 mg tablet, Take 1 Tab by mouth as needed. , Disp: 90 Tab, Rfl: 3    nitroglycerin (NITROSTAT) 0.4 mg SL tablet, 1 Tab by SubLINGual route every five (5) minutes as needed for Chest Pain., Disp: 25 Tab, Rfl: 5    magnesium 250 mg tab, Take  by mouth., Disp: , Rfl:     AMINO ACIDS/MV,IRON,MIN (OCUVITE EXTRA PO), Take  by mouth., Disp: , Rfl:     coenzyme Q-10 (CO Q-10) 200 mg capsule, Take  by mouth daily. 2qd, Disp: , Rfl:     aspirin 81 mg chewable tablet, Take 1 Tab by mouth daily. , Disp: , Rfl:      Date Last Reviewed:  8/28/2018   Number of Personal Factors/Comorbidities that affect the Plan of Care: 1-2: MODERATE COMPLEXITY   EXAMINATION:   Observation/Orthostatic Postural Assessment:   Gait:  Antalgic, stiff knee gait, uses straight cane. AROM/PROM         Joint: Eval Date:   8/23/18  Re-Assess Date:  Re-Assess Date:    Active ROM RIGHT LEFT RIGHT LEFT RIGHT LEFT   Knee Extension 0 -20       Knee Flexion 133 90       Hip Flexion         Ankle mobility                                             Passive ROM         Knee Extension 0 -10       Knee Flexion 135 100         Strength:     Eval Date:  8/23/18  Re-Assess Date:  Re-Assess Date:      RIGHT LEFT RIGHT LEFT RIGHT LEFT   Knee Flexion 5/5 3+/5       Knee Extension 5/5 3+/5       Hip Flexion 4/5 4/5       Hip Abduction 4/5 3+/5       Hip Extension 3+/5 3+/5       Ankle Dorsiflexion                        Joint Mobility Eval Date:  Re-Assess Date:  Re-Assess Date:     RIGHT LEFT RIGHT LEFT RIGHT LEFT   Tibiofemoral  hypomobility       Patellofemoral  hypomobility       Tibiofibular         Talocrual               Neurological Screen:  No radiating symptoms down leg  Functional Mobility: Limited tolerance of walking and standing. TUG test: 13.30 sec  Sit to Stand= keeps left leg extended but able to stand without use of hands. Squat= limtied by 50% decreased weight bearing left LE, decreased knee flexion. Balance:    Single leg stance: R= 30 sec / L=2 sec   Body Structures Involved:  1. Joints  2. Muscles  3. Ligaments Body Functions Affected:  1. Sensory/Pain  2. Neuromusculoskeletal  3. Movement Related Activities and Participation Affected:  1. Mobility  2. Self Care   Number of elements that affect the Plan of Care: 4+: HIGH COMPLEXITY   CLINICAL PRESENTATION:   Presentation: Evolving clinical presentation with changing clinical characteristics: MODERATE COMPLEXITY   CLINICAL DECISION MAKING:   Outcome Measure:    Tool Used: Lower Extremity Functional Scale (LEFS)  Score:  Initial: 31/80 Most Recent: X/80 (Date: -- )   Interpretation of Score: 20 questions each scored on a 5 point scale with 0 representing \"extreme difficulty or unable to perform\" and 4 representing \"no difficulty\". The lower the score, the greater the functional disability. 80/80 represents no disability. Minimal detectable change is 9 points. Score 80 79-63 62-48 47-32 31-16 15-1 0   Modifier CH CI CJ CK CL CM CN     ? Mobility - Walking and Moving Around:     - CURRENT STATUS: CL - 60%-79% impaired, limited or restricted    - GOAL STATUS: CK - 40%-59% impaired, limited or restricted    - D/C STATUS:  ---------------To be determined---------------    Medical Necessity:   · Skilled intervention continues to be required due to current impairment. Reason for Services/Other Comments:  · Patient continues to require skilled intervention due to medical complications and patient unable to attend/participate in therapy as expected. Use of outcome tool(s) and clinical judgement create a POC that gives a: Questionable prediction of patient's progress: MODERATE COMPLEXITY   TREATMENT:   (In addition to Assessment/Re-Assessment sessions the following treatments were rendered)    · Pre-Treatment Pain/ Symptoms: Did  4600 steps yesterday, knee was swollen and increased pain. 4/10, Has been doing HEP every day except for yesterday. Sees MD today about MRI on back. Is going to do ex at home. THERAPEUTIC EXERCISE: (30 minutes):  Exercises per grid below to improve mobility, strength and balance. Required minimal visual and verbal cues to promote proper body alignment, promote proper body posture and promote proper body mechanics. Progressed resistance, range and repetitions as indicated.        Date:  8/23/18 Date:  8/28/18 Date:     Activity/Exercise Parameters Parameters Parameters   Patient education Treatment expectations, pre/post op expectations, HEP Progression of HEP    Heel slide 10 x 10 x    Quad sets 10 x 20 x    SAQ 2 x 10 2 x 10 with laser feedback    SLR 2 x 10  20 x    Knee extension stretch 2 min 3 min    Nu step 8 min 8 min    bridges  20 x    clams  20 x [employer] Portal      Therapeutic Activity: ( minutes): Activity Date:   Date:   Date:     Exercise Parameters Parameters Parameters                                              Neuromuscular Education: ( Minutes):                  Manual Intervention Date:   Date:   Date:     Soft tissue mobilization Joint Mobility Parameters Parameters Parameters                                                      Modalities: ( minutes):       Treatment/Session Assessment:  Verbalized understanding of HEP and role of PT/POC. · Post-Treatment Pain/ Symptoms: 3  3/10           ·   Compliance with Program/Exercises: Perform as directed  · Recommendations/Intent for next treatment session: Patient has completed two sessions perscribed per MD for pre-op knee strengthening. Will see MD today to discuss MRI on back will call if MD wishes him to continue therapy.     Future Appointments  Date Time Provider Tenn[place] Hien   2/25/2019 9:30 AM Ronnie Tamez MD [facility]   30 min therapeutic ex,    Total Treatment Duration:  PT Patient Time In/Time Out  Time In: 0815  Time Out: 0945    Nikki Soria, RT

## 2018-08-30 ENCOUNTER — HOSPITAL ENCOUNTER (OUTPATIENT)
Dept: PHYSICAL THERAPY | Age: 68
Discharge: HOME | End: 2018-08-30
Payer: MEDICARE

## 2018-08-30 ENCOUNTER — HOSPITAL ENCOUNTER (OUTPATIENT)
Dept: PHYSICAL THERAPY | Age: 68
End: 2018-08-30
Payer: MEDICARE

## 2018-08-30 PROCEDURE — 97110 THERAPEUTIC EXERCISES: CPT

## 2018-08-30 NOTE — PROGRESS NOTES
Conrad Olivares  : 1950  [place]  Secondary: [place] [id number] [address] at . Kari Bernabe [address]  [address]. [place] [address], [address]  [phone]   [phone]         OUTPATIENT PHYSICAL THERAPY:Daily Note 2018    ICD-10: Treatment Diagnosis:  Pain in left knee (M25.562)  Osteoarthritis of knee, unspecified (M17.9)  Stiffness of left knee, not elsewhere classified (M25.662)  Effusion, left knee (M25.462)  Back  Pain (M54.5)  Precautions/Allergies:   Lisinopril; Losartan; and Statins-hmg-coa reductase inhibitors   Fall Risk Score: 1 (? 5 = High Risk)  MD Orders: Eval and Treat MEDICAL/REFERRING DIAGNOSIS:  Unilateral primary osteoarthritis, left knee [M17.12]  Spinal stenosis, lumbar region with neurogenic claudication [M48.062]  Other intervertebral disc degeneration, lumbar region [M51.36]  DATE OF ONSET: three year history  REFERRING PHYSICIAN: Jose Mckeon., *  RETURN PHYSICIAN APPOINTMENT: to be determined     INITIAL ASSESSMENT:  Mr. Roxy Hernadez presents to physical therapy with decreased strength, ROM, joint mobility, functional mobility . These S/S are consistent with left knee osteoarthritis. Patient will benefit from skilled physical therapy for manual therapeutic techniques (as appropriate), therapeutic exercises and activities, balance and comprehensive home exercises program to address current impairments and functional limitations. Conrad Olivares will benefit from skilled PT (medically necessary) in order to address above deficits affecting participation in basic ADLs and overall functional tolerance. PROBLEM LIST (Impacting functional limitations):   1. Decreased Strength  2. Decreased ADL/Functional Activities  3. Decreased Transfer Abilities  4. Decreased Ambulation Ability/Technique  5. Decreased Balance  6. Increased Pain  7. Decreased Activity Tolerance  8.  Decreased [place] with Home Exercise Program INTERVENTIONS PLANNED:  1. Balance Exercise  2. Family Education  3. Gait Training  4. Home Exercise Program (HEP)  5. Manual Therapy  6. Neuromuscular Re-education/Strengthening  7. Range of Motion (ROM)  8. Therapeutic Activites  9. Therapeutic Exercise/Strengthening  10. Transfer Training  11. TREATMENT PLAN:  Effective Dates: 8/23/2018 TO 9/22/2018 (30 days). Frequency/Duration: 2 times a week for 30 Days    GOALS: (Goals have been discussed and agreed upon with patient.)  Short-Term Functional Goals: Time Frame: 2 weeks  1. Vishnu Choi will be independent with HEP for strength and ROM  2. Vishnu Choi will tolerate manual therapy/joint mobilizations to increase knee flexion ROM so pt can ambulate stairs and walk with a more normalized gait pattern. [address] [place] [address] will participate in LE strengthening exercises for hip, knee, ankle with weight as appropriate for 3 sets of 10.  [address] [place] [address] will tolerate scar massage as appropriate to improve tissue mobility with patient to perform independently after education  5. Vishnu Choi will participate in static and dynamic balance activities for 5 minutes to help improve proprioception and decrease risk of falls  6. Vishnu Choi to increase lower extremity functional scale by 10 points to show improvement in areas of difficulty  7. Vishnu Choi will demonstrate left knee flexion >=105 degrees to improve functional mobility and tolerance of ADLs. [address] [place] [address] will demonstrate L knee extension >= 5 degrees to improve functional mobility and tolerance of ADLs  9. Vishnu Choi will improve MMTL LE to >=4+/5 to improve current level of independence and community reintegration. Long-Term Goals: Time Frame:6   1. Vishnu Choi will be independent in HEP of stretching and strengthening   2.  Vishnu Choi will ascend/descend 12 steps with reciprocal gait pattern and rail   [address] [place] [address] to increase lower extremity functional scale by15 points to show improvement in areas of difficulty     Rehabilitation Potential For Stated Goals: Good  Regarding Pili Pratt's therapy, I certify that the treatment plan above will be carried out by a therapist or under their direction. Thank you for this referral,  Orlando Turcios, RT     Referring Physician Signature: Sergio Montana., *              Date                  HISTORY:   History of Present Injury/Illness (Reason for Referral):  Patient presents with three year history of worsening left knee pain, stiffness, swelling and lack of function. Is planning to have a total knee arthroplasty in the next four months. He is here for pre-op exercise to improve range of motion, strength and overall function. Mr Twan Henderson also is complaining of left hip pain during walking.  -Present Symptoms (on day of evaluation):  Knee stiffness, swelling, and decreased walking ability. Pain;   · Currently: 5/10  · Best: 4/10  · Worst: 9/10  · Aggravating factors: walking, standing, squatting, climbing stairs. · Relieving factors: rest and medication. Past Medical History/Comorbidities:   Mr. Twan Henderson  has a past medical history of Arthritis; CAD (coronary artery disease); Chest pain, unspecified (8/31/2012); Hyperlipidemia; Hypertension (8/31/2012); Mitral valve regurgitation; NSTEMI (non-ST elevated myocardial infarction) ([address] (9/2/2012); Post PTCA; Varicose veins of both lower extremities (7/6/2016); and Varicose veins of legs. He also has no past medical history of Chronic kidney disease or Stroke ([address]   Mr. Twan Henderson  has a past surgical history that includes pr cardiac surg procedure unlist (2012); hx knee arthroscopy; hx other surgical; and hx other surgical.    Active Ambulatory Problems     Diagnosis Date Noted    Hypertension 08/31/2012    CAD (coronary artery disease) 09/02/2012    Hyperlipidemia     Mitral valve regurgitation     Varicose veins of both lower extremities 07/06/2016     Resolved Ambulatory Problems     Diagnosis Date Noted    NSTEMI (non-ST elevated myocardial infarction) ([facility] 09/02/2012    Post PTCA      Past Medical History:   Diagnosis Date    Arthritis     CAD (coronary artery disease)     Chest pain, unspecified 8/31/2012    Hyperlipidemia     Hypertension 8/31/2012    Mitral valve regurgitation     NSTEMI (non-ST elevated myocardial infarction) ([facility] 9/2/2012    Post PTCA     Varicose veins of both lower extremities 7/6/2016    Varicose veins of legs      Social History/Living Environment:        Social History     Social History    Marital status:      Spouse name: N/A    Number of children: N/A    Years of education: N/A     Occupational History    Not on file. Social History Main Topics    Smoking status: Former Smoker     Quit date: 10/5/1981    Smokeless tobacco: Never Used    Alcohol use Yes      Comment: occ    Drug use: No    Sexual activity: Not on file     Other Topics Concern    Not on file     Social History Narrative     Prior Level of Function/Work/Activity:  Retired . Previous Treatment Approach  Antiinflammatories, joint injections    Current Medications:    Current Outpatient Prescriptions:     b complex vitamins tablet, Take 1 Tab by mouth daily. , Disp: , Rfl:     Cetirizine (ZYRTEC) 10 mg cap, Take  by mouth., Disp: , Rfl:     amLODIPine (NORVASC) 5 mg tablet, Take 1 Tab by mouth as needed. , Disp: 90 Tab, Rfl: 3    nitroglycerin (NITROSTAT) 0.4 mg SL tablet, 1 Tab by SubLINGual route every five (5) minutes as needed for Chest Pain., Disp: 25 Tab, Rfl: 5    magnesium 250 mg tab, Take  by mouth., Disp: , Rfl:     AMINO ACIDS/MV,IRON,MIN (OCUVITE EXTRA PO), Take  by mouth., Disp: , Rfl:     coenzyme Q-10 (CO Q-10) 200 mg capsule, Take  by mouth daily. 2qd, Disp: , Rfl:     aspirin 81 mg chewable tablet, Take 1 Tab by mouth daily. , Disp: , Rfl:      Date Last Reviewed:  8/30/2018   Number of Personal Factors/Comorbidities that affect the Plan of Care: 1-2: MODERATE COMPLEXITY   EXAMINATION:   Observation/Orthostatic Postural Assessment:   Gait:  Antalgic, stiff knee gait, uses straight cane. AROM/PROM         Joint: Eval Date:   8/23/18  Re-Assess Date:  8/30/18  Re-Assess Date:    Active ROM RIGHT LEFT RIGHT LEFT RIGHT LEFT   Knee Extension 0 -20       Knee Flexion 133 90       Hip Flexion         Ankle mobility         Lumbar flexion   50 no pain      extension   5 mild pain central                        Passive ROM         Knee Extension 0 -10       Knee Flexion 135 100         Strength:     Eval Date:  8/23/18  Re-Assess Date:  Re-Assess Date:      RIGHT LEFT RIGHT LEFT RIGHT LEFT   Knee Flexion 5/5 3+/5       Knee Extension 5/5 3+/5       Hip Flexion 4/5 4/5       Hip Abduction 4/5 3+/5       Hip Extension 3+/5 3+/5       Ankle Dorsiflexion                        Joint Mobility Eval Date:  Re-Assess Date:  Re-Assess Date:     RIGHT LEFT RIGHT LEFT RIGHT LEFT   Tibiofemoral  hypomobility       Patellofemoral  hypomobility       Tibiofibular         Talocrual               Neurological Screen:  No radiating symptoms down leg  Functional Mobility: Limited tolerance of walking and standing. TUG test: 13.30 sec  Sit to Stand= keeps left leg extended but able to stand without use of hands. Squat= limtied by 50% decreased weight bearing left LE, decreased knee flexion. Balance:    Single leg stance: R= 30 sec / L=2 sec   Body Structures Involved:  1. Joints  2. Muscles  3. Ligaments Body Functions Affected:  1. Sensory/Pain  2. Neuromusculoskeletal  3. Movement Related Activities and Participation Affected:  1. Mobility  2. Self Care   Number of elements that affect the Plan of Care: 4+: HIGH COMPLEXITY   CLINICAL PRESENTATION:   Presentation: Evolving clinical presentation with changing clinical characteristics: MODERATE COMPLEXITY   CLINICAL DECISION MAKING:   Outcome Measure:    Tool Used: Lower Extremity Functional Scale (LEFS)  Score:  Initial: 31/80 Most Recent: X/80 (Date: -- )   Interpretation of Score: 20 questions each scored on a 5 point scale with 0 representing \"extreme difficulty or unable to perform\" and 4 representing \"no difficulty\". The lower the score, the greater the functional disability. 80/80 represents no disability. Minimal detectable change is 9 points. Score 80 79-63 62-48 47-32 31-16 15-1 0   Modifier CH CI CJ CK CL CM CN     ? Mobility - Walking and Moving Around:     - CURRENT STATUS: CL - 60%-79% impaired, limited or restricted    - GOAL STATUS: CK - 40%-59% impaired, limited or restricted    - D/C STATUS:  ---------------To be determined---------------    Medical Necessity:   · Skilled intervention continues to be required due to current impairment. Reason for Services/Other Comments:  · Patient continues to require skilled intervention due to medical complications and patient unable to attend/participate in therapy as expected. Use of outcome tool(s) and clinical judgement create a POC that gives a: Questionable prediction of patient's progress: MODERATE COMPLEXITY   TREATMENT:   (In addition to Assessment/Re-Assessment sessions the following treatments were rendered)    · Pre-Treatment Pain/ Symptoms: Saw MD wants to also work on back. Has arthritis in back  4/10, Has been doing HEP every day except for yesterday. THERAPEUTIC EXERCISE: (40 minutes):  Exercises per grid below to improve mobility, strength and balance. Required minimal visual and verbal cues to promote proper body alignment, promote proper body posture and promote proper body mechanics. Progressed resistance, range and repetitions as indicated.        Date:  8/23/18 Date:  8/28/18 Date:  8/30/18   Activity/Exercise Parameters Parameters Parameters   Patient education Treatment expectations, pre/post op expectations, HEP Progression of HEP HEP progression   Heel slide 10 x 10 x Quad sets 10 x 20 x    SAQ 2 x 10 2 x 10 with laser feedback 3 x 15 with laser   SLR 2 x 10  20 x 20 x   Knee extension stretch 2 min 3 min    Nu step 8 min 8 min 8 min   bridges  20 x 20 x   clams  20 x 20 x   Hip abd standing   Red band 20x ea   Therapy ball knees to chest    3 min   Ball rotation with knees   3 min   Hip hinge stretch   6 x     [employer] Portal      Therapeutic Activity: ( minutes): Activity Date:   Date:   Date:     Exercise Parameters Parameters Parameters                                              Neuromuscular Education: ( Minutes):                  Manual Intervention Date:   Date:   Date:     Soft tissue mobilization Joint Mobility Parameters Parameters Parameters                                                      Modalities: ( minutes):       Treatment/Session Assessment:  Verbalized understanding of HEP and role of PT/POC. · Post-Treatment Pain/ Symptoms: 3  3/10           ·   Compliance with Program/Exercises: Perform as directed  · Recommendations/Intent for next treatment session: Patient has new script for back and continue knee strengthening will continue 2 x week for 4 weeks.     Future Appointments  Date Time Provider [place]   9/5/2018 9:30 AM Panfilo Oswald Papenfuss, RT [facility]   9/7/2018 9:30 AM Rosemary Fairmount D Papenfuss, RT [facility]   9/10/2018 9:30 AM Rosemary Fairmount D Papenfuss, RT [facility]   9/12/2018 9:30 AM Rosemary Fairmount D Papenfuss, RT [facility]   9/17/2018 9:30 AM Rosemary Fairmount D Papenfuss, RT [facility]   9/19/2018 9:30 AM Rosemary Fairmount D Papenfuss, RT [facility]   9/24/2018 9:30 AM Rosemary Fairmount D Papenfuss, RT [facility]   9/26/2018 9:30 AM Rosemary Fairmount D Papenfuss, RT [facility]   2/25/2019 9:30 AM Liu Jenkins MD [facility]   30 min therapeutic ex,    Total Treatment Duration:  PT Patient Time In/Time Out  Time In: 1525  Time Out: [address], RT

## 2018-09-05 ENCOUNTER — HOSPITAL ENCOUNTER (OUTPATIENT)
Dept: PHYSICAL THERAPY | Age: 68
Discharge: HOME | End: 2018-09-05
Payer: MEDICARE

## 2018-09-05 PROCEDURE — 97110 THERAPEUTIC EXERCISES: CPT

## 2018-09-05 NOTE — PROGRESS NOTES
Archie Lema  : 1950  [place]  Secondary: [place] [address] [address] at . Kari Bernabe 37  [facility], [address]  [phone]   [phone]         OUTPATIENT PHYSICAL THERAPY:Daily Note 2018    ICD-10: Treatment Diagnosis:  Pain in left knee (M25.562)  Osteoarthritis of knee, unspecified (M17.9)  Stiffness of left knee, not elsewhere classified (M25.662)  Effusion, left knee (M25.462)  Back  Pain (M54.5)  Precautions/Allergies:   Lisinopril; Losartan; and Statins-hmg-coa reductase inhibitors   Fall Risk Score: 1 (? 5 = High Risk)  MD Orders: Eval and Treat MEDICAL/REFERRING DIAGNOSIS:  Unilateral primary osteoarthritis, left knee [M17.12]  Spinal stenosis, lumbar region with neurogenic claudication [M48.062]  Other intervertebral disc degeneration, lumbar region [M51.36]  DATE OF ONSET: three year history  REFERRING PHYSICIAN: Katie Jiang, *  RETURN PHYSICIAN APPOINTMENT: to be determined     INITIAL ASSESSMENT:  Mr. Sera Nation presents to physical therapy with decreased strength, ROM, joint mobility, functional mobility . These S/S are consistent with left knee osteoarthritis. Patient will benefit from skilled physical therapy for manual therapeutic techniques (as appropriate), therapeutic exercises and activities, balance and comprehensive home exercises program to address current impairments and functional limitations. Archie Lema will benefit from skilled PT (medically necessary) in order to address above deficits affecting participation in basic ADLs and overall functional tolerance. PROBLEM LIST (Impacting functional limitations):   1. Decreased Strength  2. Decreased ADL/Functional Activities  3. Decreased Transfer Abilities  4. Decreased Ambulation Ability/Technique  5. Decreased Balance  6. Increased Pain  7. Decreased Activity Tolerance  8.  Decreased [place] with Home Exercise Program INTERVENTIONS PLANNED:  1. Balance Exercise  2. Family Education  3. Gait Training  4. Home Exercise Program (HEP)  5. Manual Therapy  6. Neuromuscular Re-education/Strengthening  7. Range of Motion (ROM)  8. Therapeutic Activites  9. Therapeutic Exercise/Strengthening  10. Transfer Training  11. TREATMENT PLAN:  Effective Dates: 8/23/2018 TO 9/22/2018 (30 days). Frequency/Duration: 2 times a week for 30 Days    GOALS: (Goals have been discussed and agreed upon with patient.)  Short-Term Functional Goals: Time Frame: 2 weeks  1. Jeff Quilest will be independent with HEP for strength and ROM  2. Jeff Lint will tolerate manual therapy/joint mobilizations to increase knee flexion ROM so pt can ambulate stairs and walk with a more normalized gait pattern. [address] [place] [address] will participate in LE strengthening exercises for hip, knee, ankle with weight as appropriate for 3 sets of 10.  [address] [place] [address] will tolerate scar massage as appropriate to improve tissue mobility with patient to perform independently after education  5. Jeff Lint will participate in static and dynamic balance activities for 5 minutes to help improve proprioception and decrease risk of falls  6. Lenon Lint to increase lower extremity functional scale by 10 points to show improvement in areas of difficulty  7. Lenon Lint will demonstrate left knee flexion >=105 degrees to improve functional mobility and tolerance of ADLs. [address] [place] [address] will demonstrate L knee extension >= 5 degrees to improve functional mobility and tolerance of ADLs  9. Lenon Lint will improve MMTL LE to >=4+/5 to improve current level of independence and community reintegration. Long-Term Goals: Time Frame:6   1. Jeff Lint will be independent in HEP of stretching and strengthening   2.  Jeff Lint will ascend/descend 12 steps with reciprocal gait pattern and rail   [address] [place] [address] to increase lower extremity functional scale by15 points to show improvement in areas of difficulty     Rehabilitation Potential For Stated Goals: Good  Regarding Son Pratt's therapy, I certify that the treatment plan above will be carried out by a therapist or under their direction. Thank you for this referral,  Asim Silva, RT     Referring Physician Signature: Lord Mccrary., *              Date                  HISTORY:   History of Present Injury/Illness (Reason for Referral):  Patient presents with three year history of worsening left knee pain, stiffness, swelling and lack of function. Is planning to have a total knee arthroplasty in the next four months. He is here for pre-op exercise to improve range of motion, strength and overall function. Mr Negro Sung also is complaining of left hip pain during walking.  -Present Symptoms (on day of evaluation):  Knee stiffness, swelling, and decreased walking ability. Pain;   · Currently: 5/10  · Best: 4/10  · Worst: 9/10  · Aggravating factors: walking, standing, squatting, climbing stairs. · Relieving factors: rest and medication. Past Medical History/Comorbidities:   Mr. Negro Sung  has a past medical history of Arthritis; CAD (coronary artery disease); Chest pain, unspecified (8/31/2012); Hyperlipidemia; Hypertension (8/31/2012); Mitral valve regurgitation; NSTEMI (non-ST elevated myocardial infarction) ([address] (9/2/2012); Post PTCA; Varicose veins of both lower extremities (7/6/2016); and Varicose veins of legs. He also has no past medical history of Chronic kidney disease or Stroke ([address]   Mr. Negro Sung  has a past surgical history that includes pr cardiac surg procedure unlist (2012); hx knee arthroscopy; hx other surgical; and hx other surgical.    Active Ambulatory Problems     Diagnosis Date Noted    Hypertension 08/31/2012    CAD (coronary artery disease) 09/02/2012    Hyperlipidemia     Mitral valve regurgitation     Varicose veins of both lower extremities 07/06/2016 Resolved Ambulatory Problems     Diagnosis Date Noted    NSTEMI (non-ST elevated [facility] infarction) [facility] 09/02/2012    Post PTCA      Past Medical History:   Diagnosis Date    Arthritis     CAD (coronary artery disease)     Chest pain, unspecified 8/31/2012    Hyperlipidemia     Hypertension 8/31/2012    Mitral valve regurgitation     NSTEMI (non-ST elevated myocardial infarction) ([facility] 9/2/2012    Post PTCA     Varicose veins of both lower extremities 7/6/2016    Varicose veins of legs      Social History/Living Environment:        Social History     Social History    Marital status:      Spouse name: N/A    Number of children: N/A    Years of education: N/A     Occupational History    Not on file. Social History Main Topics    Smoking status: Former Smoker     Quit date: 10/5/1981    Smokeless tobacco: Never Used    Alcohol use Yes      Comment: occ    Drug use: No    Sexual activity: Not on file     Other Topics Concern    Not on file     Social History Narrative     Prior Level of Function/Work/Activity:  Retired . Previous Treatment Approach  Antiinflammatories, joint injections    Current Medications:    Current Outpatient Prescriptions:     b complex vitamins tablet, Take 1 Tab by mouth daily. , Disp: , Rfl:     Cetirizine (ZYRTEC) 10 mg cap, Take  by mouth., Disp: , Rfl:     amLODIPine (NORVASC) 5 mg tablet, Take 1 Tab by mouth as needed. , Disp: 90 Tab, Rfl: 3    nitroglycerin (NITROSTAT) 0.4 mg SL tablet, 1 Tab by SubLINGual route every five (5) minutes as needed for Chest Pain., Disp: 25 Tab, Rfl: 5    magnesium 250 mg tab, Take  by mouth., Disp: , Rfl:     AMINO ACIDS/MV,IRON,MIN (OCUVITE EXTRA PO), Take  by mouth., Disp: , Rfl:     coenzyme Q-10 (CO Q-10) 200 mg capsule, Take  by mouth daily. 2qd, Disp: , Rfl:     aspirin 81 mg chewable tablet, Take 1 Tab by mouth daily. , Disp: , Rfl:      Date Last Reviewed:  9/5/2018   Number of Personal Factors/Comorbidities that affect the Plan of Care: 1-2: MODERATE COMPLEXITY   EXAMINATION:   Observation/Orthostatic Postural Assessment:   Gait:  Antalgic, stiff knee gait, uses straight cane. AROM/PROM         Joint: Eval Date:   8/23/18  Re-Assess Date:  8/30/18  Re-Assess Date:    Active ROM RIGHT LEFT RIGHT LEFT RIGHT LEFT   Knee Extension 0 -20       Knee Flexion 133 90       Hip Flexion         Ankle mobility         Lumbar flexion   50 no pain      extension   5 mild pain central                        Passive ROM         Knee Extension 0 -10       Knee Flexion 135 100         Strength:     Eval Date:  8/23/18  Re-Assess Date:  Re-Assess Date:      RIGHT LEFT RIGHT LEFT RIGHT LEFT   Knee Flexion 5/5 3+/5       Knee Extension 5/5 3+/5       Hip Flexion 4/5 4/5       Hip Abduction 4/5 3+/5       Hip Extension 3+/5 3+/5       Ankle Dorsiflexion                        Joint Mobility Eval Date:  Re-Assess Date:  Re-Assess Date:     RIGHT LEFT RIGHT LEFT RIGHT LEFT   Tibiofemoral  hypomobility       Patellofemoral  hypomobility       Tibiofibular         Talocrual               Neurological Screen:  No radiating symptoms down leg  Functional Mobility: Limited tolerance of walking and standing. TUG test: 13.30 sec  Sit to Stand= keeps left leg extended but able to stand without use of hands. Squat= limtied by 50% decreased weight bearing left LE, decreased knee flexion. Balance:    Single leg stance: R= 30 sec / L=2 sec   Body Structures Involved:  1. Joints  2. Muscles  3. Ligaments Body Functions Affected:  1. Sensory/Pain  2. Neuromusculoskeletal  3. Movement Related Activities and Participation Affected:  1. Mobility  2. Self Care   Number of elements that affect the Plan of Care: 4+: HIGH COMPLEXITY   CLINICAL PRESENTATION:   Presentation: Evolving clinical presentation with changing clinical characteristics: MODERATE COMPLEXITY   CLINICAL DECISION MAKING:   Outcome Measure:    Tool Used: Lower Extremity Functional Scale (LEFS)  Score:  Initial: 31/80 Most Recent: X/80 (Date: -- )   Interpretation of Score: 20 questions each scored on a 5 point scale with 0 representing \"extreme difficulty or unable to perform\" and 4 representing \"no difficulty\". The lower the score, the greater the functional disability. 80/80 represents no disability. Minimal detectable change is 9 points. Score 80 79-63 62-48 47-32 31-16 15-1 0   Modifier CH CI CJ CK CL CM CN     ? Mobility - Walking and Moving Around:     - CURRENT STATUS: CL - 60%-79% impaired, limited or restricted    - GOAL STATUS: CK - 40%-59% impaired, limited or restricted    - D/C STATUS:  ---------------To be determined---------------    Medical Necessity:   · Skilled intervention continues to be required due to current impairment. Reason for Services/Other Comments:  · Patient continues to require skilled intervention due to medical complications and patient unable to attend/participate in therapy as expected. Use of outcome tool(s) and clinical judgement create a POC that gives a: Questionable prediction of patient's progress: MODERATE COMPLEXITY   TREATMENT:   (In addition to Assessment/Re-Assessment sessions the following treatments were rendered)    · Pre-Treatment Pain/ Symptoms: If I over do it my knee swells. Back is okay today. THERAPEUTIC EXERCISE: (45 minutes):  Exercises per grid below to improve mobility, strength and balance. Required minimal visual and verbal cues to promote proper body alignment, promote proper body posture and promote proper body mechanics. Progressed resistance, range and repetitions as indicated.        Date:  8/23/18 Date:  8/28/18 Date:  8/30/18 Date  9/5/18   Activity/Exercise Parameters Parameters Parameters Parameters   Patient education Treatment expectations, pre/post op expectations, HEP Progression of HEP HEP progression    Heel slide 10 x 10 x     Quad sets 10 x 20 x  20 x   SAQ 2 x 10 2 x 10 with laser feedback 3 x 15 with laser 3 x 15 with laser   SLR 2 x 10  20 x 20 x 30 x   Knee extension stretch 2 min 3 min     Nu step 8 min 8 min 8 min 10 min   bridges  20 x 20 x 30 x   clams  20 x 20 x    Hip abd standing   Red band 20x ea 25 x ea   Therapy ball knees to chest    3 min 30 x with hip knee ext to flex   Ball rotation with knees   3 min 30 x   Hip hinge stretch   6 x    Standing TKES with band    30 x blue band   Standing knee flex lunge position    Pain free ROM with laser guidance 20 x     [employer] Portal      Therapeutic Activity: ( minutes): Activity Date:   Date:   Date:     Exercise Parameters Parameters Parameters                                              Neuromuscular Education: ( Minutes):                  Manual Intervention Date:   Date:   Date:     Soft tissue mobilization Joint Mobility Parameters Parameters Parameters                                                      Modalities: ( minutes):       Treatment/Session Assessment:  Verbalized understanding of HEP and role of PT/POC. · Post-Treatment Pain/ Symptoms: 2  3/10           ·   Compliance with Program/Exercises: Performing ex daily as instructed. · Recommendations/Intent for next treatment session: Patient has new script for back and continue knee strengthening will continue 2 x week for 4 weeks.     Future Appointments  Date Time Provider [place]   9/7/2018 9:30 AM Fela Waltersfuss, RT [facility]   9/10/2018 9:30 AM Placido Quezadaenfuss, RT [facility]   9/12/2018 9:30 AM Placido Quezadaenfuss, RT [facility]   9/17/2018 9:30 AM Placido Quezadaenfuss, RT [facility]   9/19/2018 9:30 AM Placido Waltersfuss, RT [facility]   9/24/2018 9:30 AM Placido Waltersfuss, RT [facility]   9/26/2018 9:30 AM Placido Waltersfuss, RT [facility]   2/25/2019 9:30 AM Demetria Laguerre MD [facility]   30 min therapeutic ex,    Total Treatment Duration:  PT Patient Time In/Time Out  Time In: 6859  Time Out: Bijan RT

## 2018-09-07 ENCOUNTER — HOSPITAL ENCOUNTER (OUTPATIENT)
Dept: PHYSICAL THERAPY | Age: 68
Discharge: HOME | End: 2018-09-07
Payer: MEDICARE

## 2018-09-07 PROCEDURE — 97110 THERAPEUTIC EXERCISES: CPT

## 2018-09-07 NOTE — PROGRESS NOTES
Annah Litten  : 1950  [place]  Secondary: [place] [address] [address] at . Kari Bernabe 37  [facility], [address]  [phone]   [phone]         OUTPATIENT PHYSICAL THERAPY:Daily Note 2018    ICD-10: Treatment Diagnosis:  Pain in left knee (M25.562)  Osteoarthritis of knee, unspecified (M17.9)  Stiffness of left knee, not elsewhere classified (M25.662)  Effusion, left knee (M25.462)  Back  Pain (M54.5)  Precautions/Allergies:   Lisinopril; Losartan; and Statins-hmg-coa reductase inhibitors   Fall Risk Score: 1 (? 5 = High Risk)  MD Orders: Eval and Treat MEDICAL/REFERRING DIAGNOSIS:  Unilateral primary osteoarthritis, left knee [M17.12]  Spinal stenosis, lumbar region with neurogenic claudication [M48.062]  Other intervertebral disc degeneration, lumbar region [M51.36]  DATE OF ONSET: three year history  REFERRING PHYSICIAN: Sigifredo Bledsoe., *  RETURN PHYSICIAN APPOINTMENT: to be determined     INITIAL ASSESSMENT:  Mr. Shima Pagan presents to physical therapy with decreased strength, ROM, joint mobility, functional mobility . These S/S are consistent with left knee osteoarthritis. Patient will benefit from skilled physical therapy for manual therapeutic techniques (as appropriate), therapeutic exercises and activities, balance and comprehensive home exercises program to address current impairments and functional limitations. Annah Litten will benefit from skilled PT (medically necessary) in order to address above deficits affecting participation in basic ADLs and overall functional tolerance. PROBLEM LIST (Impacting functional limitations):   1. Decreased Strength  2. Decreased ADL/Functional Activities  3. Decreased Transfer Abilities  4. Decreased Ambulation Ability/Technique  5. Decreased Balance  6. Increased Pain  7. Decreased Activity Tolerance  8.  Decreased Simpson with Home Exercise Program INTERVENTIONS PLANNED:  1. Balance Exercise  2. Family Education  3. Gait Training  4. Home Exercise Program (HEP)  5. Manual Therapy  6. Neuromuscular Re-education/Strengthening  7. Range of Motion (ROM)  8. Therapeutic Activites  9. Therapeutic Exercise/Strengthening  10. Transfer Training  11. TREATMENT PLAN:  Effective Dates: 8/23/2018 TO 9/22/2018 (30 days). Frequency/Duration: 2 times a week for 30 Days    GOALS: (Goals have been discussed and agreed upon with patient.)  Short-Term Functional Goals: Time Frame: 2 weeks  1. Bebo Talbot will be independent with HEP for strength and ROM  2. Bebo Talbot will tolerate manual therapy/joint mobilizations to increase knee flexion ROM so pt can ambulate stairs and walk with a more normalized gait pattern. [address] [place] [address] will participate in LE strengthening exercises for hip, knee, ankle with weight as appropriate for 3 sets of 10.  [address] [place] [address] will tolerate scar massage as appropriate to improve tissue mobility with patient to perform independently after education  5. Bebo Talbot will participate in static and dynamic balance activities for 5 minutes to help improve proprioception and decrease risk of falls  6. Bebo Talbot to increase lower extremity functional scale by 10 points to show improvement in areas of difficulty  7. Bebo Talbot will demonstrate left knee flexion >=105 degrees to improve functional mobility and tolerance of ADLs. [address] [place] [address] will demonstrate L knee extension >= 5 degrees to improve functional mobility and tolerance of ADLs  9. Bebo Talbot will improve MMTL LE to >=4+/5 to improve current level of independence and community reintegration. Long-Term Goals: Time Frame:6   1. Bebo Talbot will be independent in HEP of stretching and strengthening   2.  Bebo Talbot will ascend/descend 12 steps with reciprocal gait pattern and rail   [address] [place] [address] to increase lower extremity functional scale by15 points to show improvement in areas of difficulty     Rehabilitation Potential For Stated Goals: Good  Regarding Dianna Pratt's therapy, I certify that the treatment plan above will be carried out by a therapist or under their direction. Thank you for this referral,  Rochelle Azar, RT     Referring Physician Signature: Brayden Lima, *              Date                  HISTORY:   History of Present Injury/Illness (Reason for Referral):  Patient presents with three year history of worsening left knee pain, stiffness, swelling and lack of function. Is planning to have a total knee arthroplasty in the next four months. He is here for pre-op exercise to improve range of motion, strength and overall function. Mr Valentina Richard also is complaining of left hip pain during walking.  -Present Symptoms (on day of evaluation):  Knee stiffness, swelling, and decreased walking ability. Pain;   · Currently: 5/10  · Best: 4/10  · Worst: 9/10  · Aggravating factors: walking, standing, squatting, climbing stairs. · Relieving factors: rest and medication. Past Medical History/Comorbidities:   Mr. Valentina Richard  has a past medical history of Arthritis; CAD (coronary artery disease); Chest pain, unspecified (8/31/2012); Hyperlipidemia; Hypertension (8/31/2012); Mitral valve regurgitation; NSTEMI (non-ST elevated myocardial infarction) ([address] (9/2/2012); Post PTCA; Varicose veins of both lower extremities (7/6/2016); and Varicose veins of legs. He also has no past medical history of Chronic kidney disease or Stroke ([address]   Mr. Valentina Richard  has a past surgical history that includes pr cardiac surg procedure unlist (2012); hx knee arthroscopy; hx other surgical; and hx other surgical.    Active Ambulatory Problems     Diagnosis Date Noted    Hypertension 08/31/2012    CAD (coronary artery disease) 09/02/2012    Hyperlipidemia     Mitral valve regurgitation     Varicose veins of both lower extremities 07/06/2016 Resolved Ambulatory Problems     Diagnosis Date Noted    NSTEMI (non-ST elevated myocardial infarction) [facility] 09/02/2012    Post PTCA      Past Medical History:   Diagnosis Date    Arthritis     CAD (coronary artery disease)     Chest pain, unspecified 8/31/2012    Hyperlipidemia     Hypertension 8/31/2012    Mitral valve regurgitation     NSTEMI (non-ST elevated myocardial infarction) ([facility] [address] 9/2/2012    Post PTCA     Varicose veins of both lower extremities 7/6/2016    Varicose veins of legs      Social History/Living Environment:        Social History     Social History    Marital status:      Spouse name: N/A    Number of children: N/A    Years of education: N/A     Occupational History    Not on file. Social History Main Topics    Smoking status: Former Smoker     Quit date: 10/5/1981    Smokeless tobacco: Never Used    Alcohol use Yes      Comment: occ    Drug use: No    Sexual activity: Not on file     Other Topics Concern    Not on file     Social History Narrative     Prior Level of Function/Work/Activity:  Retired . Previous Treatment Approach  Antiinflammatories, joint injections    Current Medications:    Current Outpatient Prescriptions:     b complex vitamins tablet, Take 1 Tab by mouth daily. , Disp: , Rfl:     Cetirizine (ZYRTEC) 10 mg cap, Take  by mouth., Disp: , Rfl:     amLODIPine (NORVASC) 5 mg tablet, Take 1 Tab by mouth as needed. , Disp: 90 Tab, Rfl: 3    nitroglycerin (NITROSTAT) 0.4 mg SL tablet, 1 Tab by SubLINGual route every five (5) minutes as needed for Chest Pain., Disp: 25 Tab, Rfl: 5    magnesium 250 mg tab, Take  by mouth., Disp: , Rfl:     AMINO ACIDS/MV,IRON,MIN (OCUVITE EXTRA PO), Take  by mouth., Disp: , Rfl:     coenzyme Q-10 (CO Q-10) 200 mg capsule, Take  by mouth daily. 2qd, Disp: , Rfl:     aspirin 81 mg chewable tablet, Take 1 Tab by mouth daily. , Disp: , Rfl:      Date Last Reviewed:  9/7/2018   Number of Personal Factors/Comorbidities that affect the Plan of Care: 1-2: MODERATE COMPLEXITY   EXAMINATION:   Observation/Orthostatic Postural Assessment:   Gait:  Antalgic, stiff knee gait, uses straight cane. AROM/PROM         Joint: Eval Date:   8/23/18  Re-Assess Date:  8/30/18  Re-Assess Date:    Active ROM RIGHT LEFT RIGHT LEFT RIGHT LEFT   Knee Extension 0 -20       Knee Flexion 133 90       Hip Flexion         Ankle mobility         Lumbar flexion   50 no pain      extension   5 mild pain central                        Passive ROM         Knee Extension 0 -10       Knee Flexion 135 100         Strength:     Eval Date:  8/23/18  Re-Assess Date:  Re-Assess Date:      RIGHT LEFT RIGHT LEFT RIGHT LEFT   Knee Flexion 5/5 3+/5       Knee Extension 5/5 3+/5       Hip Flexion 4/5 4/5       Hip Abduction 4/5 3+/5       Hip Extension 3+/5 3+/5       Ankle Dorsiflexion                        Joint Mobility Eval Date:  Re-Assess Date:  Re-Assess Date:     RIGHT LEFT RIGHT LEFT RIGHT LEFT   Tibiofemoral  hypomobility       Patellofemoral  hypomobility       Tibiofibular         Talocrual               Neurological Screen:  No radiating symptoms down leg  Functional Mobility: Limited tolerance of walking and standing. TUG test: 13.30 sec  Sit to Stand= keeps left leg extended but able to stand without use of hands. Squat= limtied by 50% decreased weight bearing left LE, decreased knee flexion. Balance:    Single leg stance: R= 30 sec / L=2 sec   Body Structures Involved:  1. Joints  2. Muscles  3. Ligaments Body Functions Affected:  1. Sensory/Pain  2. Neuromusculoskeletal  3. Movement Related Activities and Participation Affected:  1. Mobility  2. Self Care   Number of elements that affect the Plan of Care: 4+: HIGH COMPLEXITY   CLINICAL PRESENTATION:   Presentation: Evolving clinical presentation with changing clinical characteristics: MODERATE COMPLEXITY   CLINICAL DECISION MAKING:   Outcome Measure:    Tool Used: Lower Extremity Functional Scale (LEFS)  Score:  Initial: 31/80 Most Recent: X/80 (Date: -- )   Interpretation of Score: 20 questions each scored on a 5 point scale with 0 representing \"extreme difficulty or unable to perform\" and 4 representing \"no difficulty\". The lower the score, the greater the functional disability. 80/80 represents no disability. Minimal detectable change is 9 points. Score 80 79-63 62-48 47-32 31-16 15-1 0   Modifier CH CI CJ CK CL CM CN     ? Mobility - Walking and Moving Around:     - CURRENT STATUS: CL - 60%-79% impaired, limited or restricted    - GOAL STATUS: CK - 40%-59% impaired, limited or restricted    - D/C STATUS:  ---------------To be determined---------------    Medical Necessity:   · Skilled intervention continues to be required due to current impairment. Reason for Services/Other Comments:  · Patient continues to require skilled intervention due to medical complications and patient unable to attend/participate in therapy as expected. Use of outcome tool(s) and clinical judgement create a POC that gives a: Questionable prediction of patient's progress: MODERATE COMPLEXITY   TREATMENT:   (In addition to Assessment/Re-Assessment sessions the following treatments were rendered)    · Pre-Treatment Pain/ Symptoms: If I over do it my knee swells. Back is okay today. THERAPEUTIC EXERCISE: (45 minutes):  Exercises per grid below to improve mobility, strength and balance. Required minimal visual and verbal cues to promote proper body alignment, promote proper body posture and promote proper body mechanics. Progressed resistance, range and repetitions as indicated.        Date:  8/23/18 Date:  8/28/18 Date:  8/30/18 Date  9/5/18 Date  9/7/18   Activity/Exercise Parameters Parameters Parameters Parameters parameters   Patient education Treatment expectations, pre/post op expectations, HEP Progression of HEP HEP progression  HEP progressin   Heel slide 10 x 10 x      Quad sets 10 x 20 x  20 x    SAQ 2 x 10 2 x 10 with laser feedback 3 x 15 with laser 3 x 15 with laser    SLR 2 x 10  20 x 20 x 30 x With laser    Knee extension stretch 2 min 3 min   3 min   Nu step 8 min 8 min 8 min 10 min 10 min   bridges  20 x 20 x 30 x With band above kneesknee   clams  20 x 20 x     Hip abd standing   Red band 20x ea 25 x ea Lateral walk 3 min orange band above knees   Therapy ball knees to chest    3 min 30 x with hip knee ext to flex 30 x   Ball rotation with knees   3 min 30 x    Hip hinge stretch   6 x  6 x   Standing TKES with band    30 x blue band Heavy red band   Standing knee flex lunge position    Pain free ROM with laser guidance 20 x    Hip hinge with pole     20 x   LAQ     30 x with laser for terminal extension     [employer] Portal      Therapeutic Activity: ( minutes): Activity Date:   Date:   Date:     Exercise Parameters Parameters Parameters                                              Neuromuscular Education: ( Minutes):                  Manual Intervention Date:   Date:   Date:     Soft tissue mobilization Joint Mobility Parameters Parameters Parameters                                                      Modalities: ( minutes):       Treatment/Session Assessment:      · Post-Treatment Pain/ Symptoms: 2  3/10 with squatting. Active left knee ext/flex -7/105. Bilateral SLR to 60. ·   Compliance with Program/Exercises: Performing ex daily as instructed.   · Recommendations/Intent for next treatment session: Progress with range of motion and functional strengthening program..    Future Appointments  Date Time Provider Tennille Stanford   9/10/2018 9:30 AM Alverna Dryer D Papenfuss, RT [facility]   9/12/2018 9:30 AM Alverna Dryer D Papenfuss, RT [facility]   9/17/2018 9:30 AM Alverna Dryer D Papenfuss, RT [facility]   9/19/2018 9:30 AM Alverna Dryer D Papenfuss, RT [facility]   9/24/2018 9:30 AM Alverna Dryer D Papenfuss, RT [facility]   9/26/2018 9:30 AM Alverna Dryer D Papenfuss, RT [facility]   2/25/2019 9:30 AM Demetria Laguerre MD [facility]       Total Treatment Duration:  45 min Therapeutic EX  PT Patient Time In/Time Out  Time In: 0930  Time Out: [id number] Manjula Patiño PT

## 2018-09-10 ENCOUNTER — HOSPITAL ENCOUNTER (OUTPATIENT)
Dept: PHYSICAL THERAPY | Age: 68
Discharge: HOME | End: 2018-09-10
Payer: MEDICARE

## 2018-09-10 PROCEDURE — 97110 THERAPEUTIC EXERCISES: CPT

## 2018-09-10 NOTE — PROGRESS NOTES
Mikayla Pruitt  : 1950  [place]  Secondary: [place] [address] [address] at . Kari Bernabe [address]  [facility], [address]  [phone]   [phone]         OUTPATIENT PHYSICAL THERAPY:Daily Note 9/10/2018    ICD-10: Treatment Diagnosis:  Pain in left knee (M25.562)  Osteoarthritis of knee, unspecified (M17.9)  Stiffness of left knee, not elsewhere classified (M25.662)  Effusion, left knee (M25.462)  Back  Pain (M54.5)  Precautions/Allergies:   Lisinopril; Losartan; and Statins-hmg-coa reductase inhibitors   Fall Risk Score: 1 (? 5 = High Risk)  MD Orders: Eval and Treat MEDICAL/REFERRING DIAGNOSIS:  Unilateral primary osteoarthritis, left knee [M17.12]  Spinal stenosis, lumbar region with neurogenic claudication [M48.062]  Other intervertebral disc degeneration, lumbar region [M51.36]  DATE OF ONSET: three year history  REFERRING PHYSICIAN: Sergio Montana., *  RETURN PHYSICIAN APPOINTMENT: to be determined     INITIAL ASSESSMENT:  Mr. Twan Henderson presents to physical therapy with decreased strength, ROM, joint mobility, functional mobility . These S/S are consistent with left knee osteoarthritis. Patient will benefit from skilled physical therapy for manual therapeutic techniques (as appropriate), therapeutic exercises and activities, balance and comprehensive home exercises program to address current impairments and functional limitations. Mikayla Pruitt will benefit from skilled PT (medically necessary) in order to address above deficits affecting participation in basic ADLs and overall functional tolerance. PROBLEM LIST (Impacting functional limitations):   1. Decreased Strength  2. Decreased ADL/Functional Activities  3. Decreased Transfer Abilities  4. Decreased Ambulation Ability/Technique  5. Decreased Balance  6. Increased Pain  7. Decreased Activity Tolerance  8.  Decreased [place] with Home Exercise Program INTERVENTIONS PLANNED:  1. Balance Exercise  2. Family Education  3. Gait Training  4. Home Exercise Program (HEP)  5. Manual Therapy  6. Neuromuscular Re-education/Strengthening  7. Range of Motion (ROM)  8. Therapeutic Activites  9. Therapeutic Exercise/Strengthening  10. Transfer Training  11. TREATMENT PLAN:  Effective Dates: 8/23/2018 TO 9/22/2018 (30 days). Frequency/Duration: 2 times a week for 30 Days    GOALS: (Goals have been discussed and agreed upon with patient.)  Short-Term Functional Goals: Time Frame: 2 weeks  1. Mercy Garcia will be independent with HEP for strength and ROM  2. Mercy Garcia will tolerate manual therapy/joint mobilizations to increase knee flexion ROM so pt can ambulate stairs and walk with a more normalized gait pattern. [address] [place] [address] will participate in LE strengthening exercises for hip, knee, ankle with weight as appropriate for 3 sets of 10.  [address] [place] [address] will tolerate scar massage as appropriate to improve tissue mobility with patient to perform independently after education  5. Mercy Garcia will participate in static and dynamic balance activities for 5 minutes to help improve proprioception and decrease risk of falls  6. Mercy Garcia to increase lower extremity functional scale by 10 points to show improvement in areas of difficulty  7. Mercy Garcia will demonstrate left knee flexion >=105 degrees to improve functional mobility and tolerance of ADLs. [address] [place] [address] will demonstrate L knee extension >= 5 degrees to improve functional mobility and tolerance of ADLs  9. Mercy Garcia will improve MMTL LE to >=4+/5 to improve current level of independence and community reintegration. Long-Term Goals: Time Frame:6   1. Mercy Garcia will be independent in HEP of stretching and strengthening   2.  Mercy Garcia will ascend/descend 12 steps with reciprocal gait pattern and rail   [address] [place] [address] to increase lower extremity functional scale by15 points to show improvement in areas of difficulty     Rehabilitation Potential For Stated Goals: Good  Regarding Chery Pratt's therapy, I certify that the treatment plan above will be carried out by a therapist or under their direction. Thank you for this referral,  Akanksha Rico, RT     Referring Physician Signature: Reynaldo Plasencia., *              Date                  HISTORY:   History of Present Injury/Illness (Reason for Referral):  Patient presents with three year history of worsening left knee pain, stiffness, swelling and lack of function. Is planning to have a total knee arthroplasty in the next four months. He is here for pre-op exercise to improve range of motion, strength and overall function. Mr Josué Sanchez also is complaining of left hip pain during walking.  -Present Symptoms (on day of evaluation):  Knee stiffness, swelling, and decreased walking ability. Pain;   · Currently: 5/10  · Best: 4/10  · Worst: 9/10  · Aggravating factors: walking, standing, squatting, climbing stairs. · Relieving factors: rest and medication. Past Medical History/Comorbidities:   Mr. Josué Sanchez  has a past medical history of Arthritis; CAD (coronary artery disease); Chest pain, unspecified (8/31/2012); Hyperlipidemia; Hypertension (8/31/2012); Mitral valve regurgitation; NSTEMI (non-ST elevated myocardial infarction) ([address] (9/2/2012); Post PTCA; Varicose veins of both lower extremities (7/6/2016); and Varicose veins of legs. He also has no past medical history of Chronic kidney disease or Stroke ([address]   Mr. Josué Sanchez  has a past surgical history that includes pr cardiac surg procedure unlist (2012); hx knee arthroscopy; hx other surgical; and hx other surgical.    Active Ambulatory Problems     Diagnosis Date Noted    Hypertension 08/31/2012    CAD (coronary artery disease) 09/02/2012    Hyperlipidemia     Mitral valve regurgitation     Varicose veins of both lower extremities 07/06/2016     Resolved Ambulatory Problems     Diagnosis Date Noted    NSTEMI (non-ST elevated [facility] infarction) [facility] 09/02/2012    Post PTCA      Past Medical History:   Diagnosis Date    Arthritis     CAD (coronary artery disease)     Chest pain, unspecified 8/31/2012    Hyperlipidemia     Hypertension 8/31/2012    Mitral valve regurgitation     NSTEMI (non-ST elevated myocardial infarction) ([facility] 9/2/2012    Post PTCA     Varicose veins of both lower extremities 7/6/2016    Varicose veins of legs      Social History/Living Environment:        Social History     Social History    Marital status:      Spouse name: N/A    Number of children: N/A    Years of education: N/A     Occupational History    Not on file. Social History Main Topics    Smoking status: Former Smoker     Quit date: 10/5/1981    Smokeless tobacco: Never Used    Alcohol use Yes      Comment: occ    Drug use: No    Sexual activity: Not on file     Other Topics Concern    Not on file     Social History Narrative     Prior Level of Function/Work/Activity:  Retired . Previous Treatment Approach  Antiinflammatories, joint injections    Current Medications:    Current Outpatient Prescriptions:     b complex vitamins tablet, Take 1 Tab by mouth daily. , Disp: , Rfl:     Cetirizine (ZYRTEC) 10 mg cap, Take  by mouth., Disp: , Rfl:     amLODIPine (NORVASC) 5 mg tablet, Take 1 Tab by mouth as needed. , Disp: 90 Tab, Rfl: 3    nitroglycerin (NITROSTAT) 0.4 mg SL tablet, 1 Tab by SubLINGual route every five (5) minutes as needed for Chest Pain., Disp: 25 Tab, Rfl: 5    magnesium 250 mg tab, Take  by mouth., Disp: , Rfl:     AMINO ACIDS/MV,IRON,MIN (OCUVITE EXTRA PO), Take  by mouth., Disp: , Rfl:     coenzyme Q-10 (CO Q-10) 200 mg capsule, Take  by mouth daily. 2qd, Disp: , Rfl:     aspirin 81 mg chewable tablet, Take 1 Tab by mouth daily. , Disp: , Rfl:      Date Last Reviewed:  9/10/2018   Number of Personal Factors/Comorbidities that affect the Plan of Care: 1-2: MODERATE COMPLEXITY   EXAMINATION:   Observation/Orthostatic Postural Assessment:   Gait:  Antalgic, stiff knee gait, uses straight cane. AROM/PROM         Joint: Eval Date:   8/23/18  Re-Assess Date:  8/30/18  Re-Assess Date:    Active ROM RIGHT LEFT RIGHT LEFT RIGHT LEFT   Knee Extension 0 -20       Knee Flexion 133 90       Hip Flexion         Ankle mobility         Lumbar flexion   50 no pain      extension   5 mild pain central                        Passive ROM         Knee Extension 0 -10       Knee Flexion 135 100         Strength:     Eval Date:  8/23/18  Re-Assess Date:  Re-Assess Date:      RIGHT LEFT RIGHT LEFT RIGHT LEFT   Knee Flexion 5/5 3+/5       Knee Extension 5/5 3+/5       Hip Flexion 4/5 4/5       Hip Abduction 4/5 3+/5       Hip Extension 3+/5 3+/5       Ankle Dorsiflexion                        Joint Mobility Eval Date:  Re-Assess Date:  Re-Assess Date:     RIGHT LEFT RIGHT LEFT RIGHT LEFT   Tibiofemoral  hypomobility       Patellofemoral  hypomobility       Tibiofibular         Talocrual               Neurological Screen:  No radiating symptoms down leg  Functional Mobility: Limited tolerance of walking and standing. TUG test: 13.30 sec  Sit to Stand= keeps left leg extended but able to stand without use of hands. Squat= limtied by 50% decreased weight bearing left LE, decreased knee flexion. Balance:    Single leg stance: R= 30 sec / L=2 sec   Body Structures Involved:  1. Joints  2. Muscles  3. Ligaments Body Functions Affected:  1. Sensory/Pain  2. Neuromusculoskeletal  3. Movement Related Activities and Participation Affected:  1. Mobility  2. Self Care   Number of elements that affect the Plan of Care: 4+: HIGH COMPLEXITY   CLINICAL PRESENTATION:   Presentation: Evolving clinical presentation with changing clinical characteristics: MODERATE COMPLEXITY   CLINICAL DECISION MAKING:   Outcome Measure:    Tool Used: Lower Extremity Functional Scale (LEFS)  Score:  Initial: 31/80 Most Recent: X/80 (Date: -- )   Interpretation of Score: 20 questions each scored on a 5 point scale with 0 representing \"extreme difficulty or unable to perform\" and 4 representing \"no difficulty\". The lower the score, the greater the functional disability. 80/80 represents no disability. Minimal detectable change is 9 points. Score 80 79-63 62-48 47-32 31-16 15-1 0   Modifier CH CI CJ CK CL CM CN     ? Mobility - Walking and Moving Around:     - CURRENT STATUS: CL - 60%-79% impaired, limited or restricted    - GOAL STATUS: CK - 40%-59% impaired, limited or restricted    - D/C STATUS:  ---------------To be determined---------------    Medical Necessity:   · Skilled intervention continues to be required due to current impairment. Reason for Services/Other Comments:  · Patient continues to require skilled intervention due to medical complications and patient unable to attend/participate in therapy as expected. TREATMENT:   (In addition to Assessment/Re-Assessment sessions the following treatments were rendered)    · Pre-Treatment Pain/ Symptoms: Did a lot of yard work over weekend, just sore and stiff. THERAPEUTIC EXERCISE: (45 minutes):  Exercises per grid below to improve mobility, strength and balance. Required minimal visual and verbal cues to promote proper body alignment, promote proper body posture and promote proper body mechanics. Progressed resistance, range and repetitions as indicated.        Date:  8/23/18 Date:  8/28/18 Date:  8/30/18 Date  9/5/18 Date  9/7/18 Date  9/10/18   Activity/Exercise Parameters Parameters Parameters Parameters parameters parameters   Patient education Treatment expectations, pre/post op expectations, HEP Progression of HEP HEP progression  HEP progressin    Heel slide 10 x 10 x       Quad sets 10 x 20 x  20 x     SAQ 2 x 10 2 x 10 with laser feedback 3 x 15 with laser 3 x 15 with laser SLR 2 x 10  20 x 20 x 30 x With laser     Knee extension stretch 2 min 3 min   3 min    Nu step 8 min 8 min 8 min 10 min 10 min 10 min level 4.6   bridges  20 x 20 x 30 x With band above kneesknee    clams  20 x 20 x      Hip abd standing   Red band 20x ea 25 x ea Lateral walk 3 min orange band above knees 3 min   Therapy ball knees to chest    3 min 30 x with hip knee ext to flex 30 x    Ball rotation with knees   3 min 30 x     Hip hinge stretch   6 x  6 x 10 x   Standing TKES with band    30 x blue band Heavy red band Heavy black band 3 min   Standing knee flex lunge position    Pain free ROM with laser guidance 20 x     Hip hinge with pole     20 x 20 x   LAQ     30 x with laser for terminal extension    Calf raise      On stair 10 x   Dead lift       10 lbs 2 x 10   Shuttle squat      30 x 3 cords   Therapy ball sitting flexion stretch      5 min     [employer]      Therapeutic Activity: ( minutes): Activity Date:   Date:   Date:     Exercise Parameters Parameters Parameters                                              Neuromuscular Education: ( Minutes):                  Manual Intervention Date:   Date:   Date:     Soft tissue mobilization Joint Mobility Parameters Parameters Parameters                                                      Modalities: ( minutes):       Treatment/Session Assessment:      · Post-Treatment Pain/ Symptoms: 2  2/10 with squatting. Active left knee ext/flex -7/108. Bilateral SLR to 60. ·   Compliance with Program/Exercises: Performing ex daily as instructed.   · Recommendations/Intent for next treatment session: Progress with range of motion and functional strengthening program..    Future Appointments  Date Time Provider [place]   9/12/2018 9:30 AM [facility]   9/17/2018 9:30 AM [facility]   9/19/2018 9:30 AM [facility]oria [facility]   9/24/2018 9:30 AM Lamine Soria, RT [facility]   9/26/2018 9:30 AM Diane Soria, RT [facility]   2/25/2019 9:30 AM Balbir Zamora MD [facility]       Total Treatment Duration:  45 min Therapeutic EX  PT Patient Time In/Time Out  Time In: 0925  Time Out: 97 Juhi Salter PT

## 2018-09-12 ENCOUNTER — HOSPITAL ENCOUNTER (OUTPATIENT)
Dept: PHYSICAL THERAPY | Age: 68
Discharge: HOME | End: 2018-09-12
Payer: MEDICARE

## 2018-09-12 PROCEDURE — 97110 THERAPEUTIC EXERCISES: CPT

## 2018-09-12 NOTE — PROGRESS NOTES
Vamsi More  : 1950  [place]  Secondary: [place] [address] [address] . Kari Bernabe [address]  [facility], [address] [facility]  [phone]   [phone]         OUTPATIENT PHYSICAL THERAPY:Daily Note 2018    ICD-10: Treatment Diagnosis:  Pain in left knee (M25.562)  Osteoarthritis of knee, unspecified (M17.9)  Stiffness of left knee, not elsewhere classified (M25.662)  Effusion, left knee (M25.462)  Back  Pain (M54.5)  Precautions/Allergies:   Lisinopril; Losartan; and Statins-hmg-coa reductase inhibitors   Fall Risk Score: 1 (? 5 = High Risk)  MD Orders: Eval and Treat MEDICAL/REFERRING DIAGNOSIS:  Unilateral primary osteoarthritis, left knee [M17.12]  Spinal stenosis, lumbar region with neurogenic claudication [M48.062]  Other intervertebral disc degeneration, lumbar region [M51.36]  DATE OF ONSET: three year history  REFERRING PHYSICIAN: Jv Carnes., *  RETURN PHYSICIAN APPOINTMENT: to be determined     INITIAL ASSESSMENT:  Mr. Malvina Gowers presents to physical therapy with decreased strength, ROM, joint mobility, functional mobility . These S/S are consistent with left knee osteoarthritis. Patient will benefit from skilled physical therapy for manual therapeutic techniques (as appropriate), therapeutic exercises and activities, balance and comprehensive home exercises program to address current impairments and functional limitations. Vamsi More will benefit from skilled PT (medically necessary) in order to address above deficits affecting participation in basic ADLs and overall functional tolerance. PROBLEM LIST (Impacting functional limitations):   1. Decreased Strength  2. Decreased ADL/Functional Activities  3. Decreased Transfer Abilities  4. Decreased Ambulation Ability/Technique  5. Decreased Balance  6. Increased Pain  7. Decreased Activity Tolerance  8.  Decreased [place] with Home Exercise Program INTERVENTIONS PLANNED:  1. Balance Exercise  2. Family Education  3. Gait Training  4. Home Exercise Program (HEP)  5. Manual Therapy  6. Neuromuscular Re-education/Strengthening  7. Range of Motion (ROM)  8. Therapeutic Activites  9. Therapeutic Exercise/Strengthening  10. Transfer Training  11. TREATMENT PLAN:  Effective Dates: 8/23/2018 TO 9/22/2018 (30 days). Frequency/Duration: 2 times a week for 30 Days    GOALS: (Goals have been discussed and agreed upon with patient.)  Short-Term Functional Goals: Time Frame: 2 weeks  1. Bebo Talbot will be independent with HEP for strength and ROM  2. Bebo Talbot will tolerate manual therapy/joint mobilizations to increase knee flexion ROM so pt can ambulate stairs and walk with a more normalized gait pattern. [address] [place] [address] will participate in LE strengthening exercises for hip, knee, ankle with weight as appropriate for 3 sets of 10.  [address] [place] [address] will tolerate scar massage as appropriate to improve tissue mobility with patient to perform independently after education  5. Bebo Talbot will participate in static and dynamic balance activities for 5 minutes to help improve proprioception and decrease risk of falls  6. Bebo Talbot to increase lower extremity functional scale by 10 points to show improvement in areas of difficulty  7. Bebo Talbot will demonstrate left knee flexion >=105 degrees to improve functional mobility and tolerance of ADLs. [address] [place] [address] will demonstrate L knee extension >= 5 degrees to improve functional mobility and tolerance of ADLs  9. Bebo Talbot will improve MMTL LE to >=4+/5 to improve current level of independence and community reintegration. Long-Term Goals: Time Frame:6   1. Bebo Talbot will be independent in HEP of stretching and strengthening   2.  Bebo Talbot will ascend/descend 12 steps with reciprocal gait pattern and rail   [address] [place] [address] to increase lower extremity functional scale by15 points to show improvement in areas of difficulty     Rehabilitation Potential For Stated Goals: Good  Regarding Kiran Pratt's therapy, I certify that the treatment plan above will be carried out by a therapist or under their direction. Thank you for this referral,  Magalis Silva, RT     Referring Physician Signature: Margaux Dalton., *              Date                  HISTORY:   History of Present Injury/Illness (Reason for Referral):  Patient presents with three year history of worsening left knee pain, stiffness, swelling and lack of function. Is planning to have a total knee arthroplasty in the next four months. He is here for pre-op exercise to improve range of motion, strength and overall function. Mr Digna Pardo also is complaining of left hip pain during walking.  -Present Symptoms (on day of evaluation):  Knee stiffness, swelling, and decreased walking ability. Pain;   · Currently: 5/10  · Best: 4/10  · Worst: 9/10  · Aggravating factors: walking, standing, squatting, climbing stairs. · Relieving factors: rest and medication. Past Medical History/Comorbidities:   Mr. Digna Pardo  has a past medical history of Arthritis; CAD (coronary artery disease); Chest pain, unspecified (8/31/2012); Hyperlipidemia; Hypertension (8/31/2012); Mitral valve regurgitation; NSTEMI (non-ST elevated myocardial infarction) ([address] (9/2/2012); Post PTCA; Varicose veins of both lower extremities (7/6/2016); and Varicose veins of legs. He also has no past medical history of Chronic kidney disease or Stroke ([address]   Mr. Digna Pardo  has a past surgical history that includes pr cardiac surg procedure unlist (2012); hx knee arthroscopy; hx other surgical; and hx other surgical.    Active Ambulatory Problems     Diagnosis Date Noted    Hypertension 08/31/2012    CAD (coronary artery disease) 09/02/2012    Hyperlipidemia     Mitral valve regurgitation     Varicose veins of both lower extremities 07/06/2016     Resolved Ambulatory Problems     Diagnosis Date Noted    NSTEMI (non-ST elevated myocardial infarction) [facility] 09/02/2012    Post PTCA      Past Medical History:   Diagnosis Date    Arthritis     CAD (coronary artery disease)     Chest pain, unspecified 8/31/2012    Hyperlipidemia     Hypertension 8/31/2012    Mitral valve regurgitation     NSTEMI (non-ST elevated myocardial infarction) ([facility] 9/2/2012    Post PTCA     Varicose veins of both lower extremities 7/6/2016    Varicose veins of legs      Social History/Living Environment:        Social History     Social History    Marital status:      Spouse name: N/A    Number of children: N/A    Years of education: N/A     Occupational History    Not on file. Social History Main Topics    Smoking status: Former Smoker     Quit date: 10/5/1981    Smokeless tobacco: Never Used    Alcohol use Yes      Comment: occ    Drug use: No    Sexual activity: Not on file     Other Topics Concern    Not on file     Social History Narrative     Prior Level of Function/Work/Activity:  Retired . Previous Treatment Approach  Antiinflammatories, joint injections    Current Medications:    Current Outpatient Prescriptions:     b complex vitamins tablet, Take 1 Tab by mouth daily. , Disp: , Rfl:     Cetirizine (ZYRTEC) 10 mg cap, Take  by mouth., Disp: , Rfl:     amLODIPine (NORVASC) 5 mg tablet, Take 1 Tab by mouth as needed. , Disp: 90 Tab, Rfl: 3    nitroglycerin (NITROSTAT) 0.4 mg SL tablet, 1 Tab by SubLINGual route every five (5) minutes as needed for Chest Pain., Disp: 25 Tab, Rfl: 5    magnesium 250 mg tab, Take  by mouth., Disp: , Rfl:     AMINO ACIDS/MV,IRON,MIN (OCUVITE EXTRA PO), Take  by mouth., Disp: , Rfl:     coenzyme Q-10 (CO Q-10) 200 mg capsule, Take  by mouth daily. 2qd, Disp: , Rfl:     aspirin 81 mg chewable tablet, Take 1 Tab by mouth daily. , Disp: , Rfl:      Date Last Reviewed:  9/12/2018   Number of Personal Factors/Comorbidities that affect the Plan of Care: 1-2: MODERATE COMPLEXITY   EXAMINATION:   Observation/Orthostatic Postural Assessment:   Gait:  Antalgic, stiff knee gait, uses straight cane. AROM/PROM         Joint: Eval Date:   8/23/18  Re-Assess Date:  8/30/18  Re-Assess Date:    Active ROM RIGHT LEFT RIGHT LEFT RIGHT LEFT   Knee Extension 0 -20       Knee Flexion 133 90       Hip Flexion         Ankle mobility         Lumbar flexion   50 no pain      extension   5 mild pain central                        Passive ROM         Knee Extension 0 -10       Knee Flexion 135 100         Strength:     Eval Date:  8/23/18  Re-Assess Date:  Re-Assess Date:      RIGHT LEFT RIGHT LEFT RIGHT LEFT   Knee Flexion 5/5 3+/5       Knee Extension 5/5 3+/5       Hip Flexion 4/5 4/5       Hip Abduction 4/5 3+/5       Hip Extension 3+/5 3+/5       Ankle Dorsiflexion                        Joint Mobility Eval Date:  Re-Assess Date:  Re-Assess Date:     RIGHT LEFT RIGHT LEFT RIGHT LEFT   Tibiofemoral  hypomobility       Patellofemoral  hypomobility       Tibiofibular         Talocrual               Neurological Screen:  No radiating symptoms down leg  Functional Mobility: Limited tolerance of walking and standing. TUG test: 13.30 sec  Sit to Stand= keeps left leg extended but able to stand without use of hands. Squat= limtied by 50% decreased weight bearing left LE, decreased knee flexion. Balance:    Single leg stance: R= 30 sec / L=2 sec   Body Structures Involved:  1. Joints  2. Muscles  3. Ligaments Body Functions Affected:  1. Sensory/Pain  2. Neuromusculoskeletal  3. Movement Related Activities and Participation Affected:  1. Mobility  2. Self Care   Number of elements that affect the Plan of Care: 4+: HIGH COMPLEXITY   CLINICAL PRESENTATION:   Presentation: Evolving clinical presentation with changing clinical characteristics: MODERATE COMPLEXITY   CLINICAL DECISION MAKING:   Outcome Measure:    Tool Used: Lower Extremity Functional Scale (LEFS)  Score:  Initial: 31/80 Most Recent: X/80 (Date: -- )   Interpretation of Score: 20 questions each scored on a 5 point scale with 0 representing \"extreme difficulty or unable to perform\" and 4 representing \"no difficulty\". The lower the score, the greater the functional disability. 80/80 represents no disability. Minimal detectable change is 9 points. Score 80 79-63 62-48 47-32 31-16 15-1 0   Modifier CH CI CJ CK CL CM CN     ? Mobility - Walking and Moving Around:     - CURRENT STATUS: CL - 60%-79% impaired, limited or restricted    - GOAL STATUS: CK - 40%-59% impaired, limited or restricted    - D/C STATUS:  ---------------To be determined---------------    Medical Necessity:   · Skilled intervention continues to be required due to current impairment. Reason for Services/Other Comments:  · Patient continues to require skilled intervention due to medical complications and patient unable to attend/participate in therapy as expected. TREATMENT:   (In addition to Assessment/Re-Assessment sessions the following treatments were rendered)    · Pre-Treatment Pain/ Symptoms: Left hip pain with prolonged walking, but can walk down stairs one after the other. THERAPEUTIC EXERCISE: (45 minutes):  Exercises per grid below to improve mobility, strength and balance. Required minimal visual and verbal cues to promote proper body alignment, promote proper body posture and promote proper body mechanics. Progressed resistance, range and repetitions as indicated.        Date:  8/23/18 Date:  8/28/18 Date:  8/30/18 Date  9/5/18 Date  9/7/18 Date  9/10/18 Date  9/12/18   Activity/Exercise Parameters Parameters Parameters Parameters parameters parameters parameters   Patient education Treatment expectations, pre/post op expectations, HEP Progression of HEP HEP progression  HEP progressin     Heel slide 10 x 10 x        Quad sets 10 x 20 x  20 x      SAQ 2 x 10 2 x 10 with laser feedback 3 x 15 with laser 3 x 15 with laser      SLR 2 x 10  20 x 20 x 30 x With laser   30 x   Knee extension stretch 2 min 3 min   3 min     Nu step 8 min 8 min 8 min 10 min 10 min 10 min level 4.6 10 min 4.6 level   bridges  20 x 20 x 30 x With band above kneesknee     clams  20 x 20 x       Hip abd standing   Red band 20x ea 25 x ea Lateral walk 3 min orange band above knees 3 min 3 min   Therapy ball knees to chest    3 min 30 x with hip knee ext to flex 30 x     Ball rotation with knees   3 min 30 x      Hip hinge stretch   6 x  6 x 10 x 10 x   Standing TKES with band    30 x blue band Heavy red band Heavy black band 3 min 4 min heavy black   Standing knee flex lunge position    Pain free ROM with laser guidance 20 x      Hip hinge with pole     20 x 20 x 20 x   LAQ     30 x with laser for terminal extension  5 lbs 10 sec    Calf raise      On stair 10 x 12 x    Dead lift       10 lbs 2 x 10 Black heavy band  2 x 10    Shuttle squat      30 x 3 cords 30 x 4 cords   Therapy ball sitting flexion stretch      5 min      [facility]      Therapeutic Activity: ( minutes): Activity Date:   Date:   Date:     Exercise Parameters Parameters Parameters                                              Neuromuscular Education: ( Minutes):                  Manual Intervention Date:   Date:   Date:     Soft tissue mobilization Joint Mobility Parameters Parameters Parameters                                                      Modalities: ( minutes):       Treatment/Session Assessment:      · Post-Treatment Pain/ Symptoms: 1  2/10 with squatting. Active left knee ext/flex -5/109. Bilateral SLR to 70. ·   Compliance with Program/Exercises: Performing ex daily as instructed.   · Recommendations/Intent for next treatment session: Progress with range of motion and functional strengthening program..    Future Appointments  Date Time Provider [place]   9/17/2018 9:30 AM [facility]   9/19/2018 9:30 AM [facility]   9/24/2018 9:30 AM Diane MC Papkennafuss, RT [facility]   9/26/2018 9:30 AM Marleny Noel Papenfuss, RT [facility]   2/25/2019 9:30 AM Balbir Zamora MD [facility]       Total Treatment Duration:  45 min Therapeutic EX  PT Patient Time In/Time Out  Time In: 0946  Time Out: 97 Juhi Salter PT

## 2018-09-15 ENCOUNTER — APPOINTMENT (RX ONLY)
Dept: URBAN - METROPOLITAN AREA CLINIC 23 | Facility: CLINIC | Age: 68
Setting detail: DERMATOLOGY
End: 2018-09-15

## 2018-09-15 PROBLEM — C44.519 BASAL CELL CARCINOMA OF SKIN OF OTHER PART OF TRUNK: Status: ACTIVE | Noted: 2018-09-15

## 2018-09-15 PROCEDURE — ? EXCISION

## 2018-09-15 PROCEDURE — 11602 EXC TR-EXT MAL+MARG 1.1-2 CM: CPT

## 2018-09-15 PROCEDURE — ? OTHER

## 2018-09-15 PROCEDURE — 12032 INTMD RPR S/A/T/EXT 2.6-7.5: CPT

## 2018-09-15 NOTE — PROCEDURE: OTHER
Note Text (......Xxx Chief Complaint.): This diagnosis correlates with the
Other (Free Text): Diagnosis and biopsy site were confirmed pre-operatively by both physician and medical assisitant by using pathology report and photograph taken at time of biopsy.  \\n\\nSpecimen(s) sent to [facility] for testing.
Detail Level: Simple

## 2018-09-15 NOTE — PROCEDURE: EXCISION
Dermal Autograft Text: The defect edges were debeveled with a #15 scalpel blade.  Given the location of the defect, shape of the defect and the proximity to free margins a dermal autograft was deemed most appropriate.  Using a sterile surgical marker, the primary defect shape was transferred to the donor site. The area thus outlined was incised deep to adipose tissue with a #15 scalpel blade.  The harvested graft was then trimmed of adipose and epidermal tissue until only dermis was left.  The skin graft was then placed in the primary defect and oriented appropriately.
Complex Repair And Dermal Autograft Text: The defect edges were debeveled with a #15 scalpel blade.  The primary defect was closed partially with a complex linear closure.  Given the location of the defect, shape of the defect and the proximity to free margins an dermal autograft was deemed most appropriate to repair the remaining defect.  The graft was trimmed to fit the size of the remaining defect.  The graft was then placed in the primary defect, oriented appropriately, and sutured into place.
Island Pedicle Flap-Requiring Vessel Identification Text: The defect edges were debeveled with a #15 scalpel blade.  Given the location of the defect, shape of the defect and the proximity to free margins an island pedicle advancement flap was deemed most appropriate.  Using a sterile surgical marker, an appropriate advancement flap was drawn, based on the axial vessel mentioned above, incorporating the defect, outlining the appropriate donor tissue and placing the expected incisions within the relaxed skin tension lines where possible.    The area thus outlined was incised deep to adipose tissue with a #15 scalpel blade.  The skin margins were undermined to an appropriate distance in all directions around the primary defect and laterally outward around the island pedicle utilizing iris scissors.  There was minimal undermining beneath the pedicle flap.
Additional Anesthesia Volume In Cc: 0
Render Post-Care Instructions In Note?: yes
Skin Substitute Text: The defect edges were debeveled with a #15 scalpel blade.  Given the location of the defect, shape of the defect and the proximity to free margins a skin substitute graft was deemed most appropriate.  The graft material was trimmed to fit the size of the defect. The graft was then placed in the primary defect and oriented appropriately.
Complex Repair And Xenograft Text: The defect edges were debeveled with a #15 scalpel blade.  The primary defect was closed partially with a complex linear closure.  Given the location of the defect, shape of the defect and the proximity to free margins an tissue cultured epidermal autograft was deemed most appropriate to repair the remaining defect.  The graft was trimmed to fit the size of the remaining defect.  The graft was then placed in the primary defect, oriented appropriately, and sutured into place.
Path Notes (To The Dermatopathologist): Please check margins
Elliptical Excision Additional Text (Leave Blank If You Do Not Want): The margin was drawn around the clinically apparent lesion.  An elliptical shape was then drawn on the skin incorporating the lesion and margins.  Incisions were then made along these lines to the appropriate tissue plane and the lesion was extirpated.
Dorsal Nasal Flap Text: The defect edges were debeveled with a #15 scalpel blade.  Given the location of the defect and the proximity to free margins a dorsal nasal flap was deemed most appropriate.  Using a sterile surgical marker, an appropriate dorsal nasal flap was drawn around the defect.    The area thus outlined was incised deep to adipose tissue with a #15 scalpel blade.  The skin margins were undermined to an appropriate distance in all directions utilizing iris scissors.
Advancement-Rotation Flap Text: The defect edges were debeveled with a #15 scalpel blade.  Given the location of the defect, shape of the defect and the proximity to free margins an advancement-rotation flap was deemed most appropriate.  Using a sterile surgical marker, an appropriate flap was drawn incorporating the defect and placing the expected incisions within the relaxed skin tension lines where possible. The area thus outlined was incised deep to adipose tissue with a #15 scalpel blade.  The skin margins were undermined to an appropriate distance in all directions utilizing iris scissors.
Bill For Surgical Tray: no
Intermediate / Complex Repair - Final Wound Length In Cm: 4.2
Bilobed Transposition Flap Text: The defect edges were debeveled with a #15 scalpel blade.  Given the location of the defect and the proximity to free margins a bilobed transposition flap was deemed most appropriate.  Using a sterile surgical marker, an appropriate bilobe flap drawn around the defect.    The area thus outlined was incised deep to adipose tissue with a #15 scalpel blade.  The skin margins were undermined to an appropriate distance in all directions utilizing iris scissors.
Deep Sutures: 4-0 Vicryl
Cartilage Graft Text: The defect edges were debeveled with a #15 scalpel blade.  Given the location of the defect, shape of the defect, the fact the defect involved a full thickness cartilage defect a cartilage graft was deemed most appropriate.  An appropriate donor site was identified, cleansed, and anesthetized. The cartilage graft was then harvested and transferred to the recipient site, oriented appropriately and then sutured into place.  The secondary defect was then repaired using a primary closure.
Rhombic Flap Text: The defect edges were debeveled with a #15 scalpel blade.  Given the location of the defect and the proximity to free margins a rhombic flap was deemed most appropriate.  Using a sterile surgical marker, an appropriate rhombic flap was drawn incorporating the defect.    The area thus outlined was incised deep to adipose tissue with a #15 scalpel blade.  The skin margins were undermined to an appropriate distance in all directions utilizing iris scissors.
Island Pedicle Flap With Canthal Suspension Text: The defect edges were debeveled with a #15 scalpel blade.  Given the location of the defect, shape of the defect and the proximity to free margins an island pedicle advancement flap was deemed most appropriate.  Using a sterile surgical marker, an appropriate advancement flap was drawn incorporating the defect, outlining the appropriate donor tissue and placing the expected incisions within the relaxed skin tension lines where possible. The area thus outlined was incised deep to adipose tissue with a #15 scalpel blade.  The skin margins were undermined to an appropriate distance in all directions around the primary defect and laterally outward around the island pedicle utilizing iris scissors.  There was minimal undermining beneath the pedicle flap. A suspension suture was placed in the canthal tendon to prevent tension and prevent ectropion.
Bi-Rhombic Flap Text: The defect edges were debeveled with a #15 scalpel blade.  Given the location of the defect and the proximity to free margins a bi-rhombic flap was deemed most appropriate.  Using a sterile surgical marker, an appropriate rhombic flap was drawn incorporating the defect. The area thus outlined was incised deep to adipose tissue with a #15 scalpel blade.  The skin margins were undermined to an appropriate distance in all directions utilizing iris scissors.
Burow's Advancement Flap Text: The defect edges were debeveled with a #15 scalpel blade.  Given the location of the defect and the proximity to free margins a Burow's advancement flap was deemed most appropriate.  Using a sterile surgical marker, the appropriate advancement flap was drawn incorporating the defect and placing the expected incisions within the relaxed skin tension lines where possible.    The area thus outlined was incised deep to adipose tissue with a #15 scalpel blade.  The skin margins were undermined to an appropriate distance in all directions utilizing iris scissors.
Paramedian Forehead Flap Text: A decision was made to reconstruct the defect utilizing an interpolation axial flap and a staged reconstruction.  A telfa template was made of the defect.  This telfa template was then used to outline the paramedian forehead pedicle flap.  The donor area for the pedicle flap was then injected with anesthesia.  The flap was excised through the skin and subcutaneous tissue down to the layer of the underlying musculature.  The pedicle flap was carefully excised within this deep plane to maintain its blood supply.  The edges of the donor site were undermined.   The donor site was closed in a primary fashion.  The pedicle was then rotated into position and sutured.  Once the tube was sutured into place, adequate blood supply was confirmed with blanching and refill.  The pedicle was then wrapped with xeroform gauze and dressed appropriately with a telfa and gauze bandage to ensure continued blood supply and protect the attached pedicle.
Epidermal Sutures: 4-0 Prolene
Anesthesia Type: 1% lidocaine with epinephrine
Complex Repair And O-L Flap Text: The defect edges were debeveled with a #15 scalpel blade.  The primary defect was closed partially with a complex linear closure.  Given the location of the remaining defect, shape of the defect and the proximity to free margins an O-L flap was deemed most appropriate for complete closure of the defect.  Using a sterile surgical marker, an appropriate flap was drawn incorporating the defect and placing the expected incisions within the relaxed skin tension lines where possible.    The area thus outlined was incised deep to adipose tissue with a #15 scalpel blade.  The skin margins were undermined to an appropriate distance in all directions utilizing iris scissors.
Double Island Pedicle Flap Text: The defect edges were debeveled with a #15 scalpel blade.  Given the location of the defect, shape of the defect and the proximity to free margins a double island pedicle advancement flap was deemed most appropriate.  Using a sterile surgical marker, an appropriate advancement flap was drawn incorporating the defect, outlining the appropriate donor tissue and placing the expected incisions within the relaxed skin tension lines where possible.    The area thus outlined was incised deep to adipose tissue with a #15 scalpel blade.  The skin margins were undermined to an appropriate distance in all directions around the primary defect and laterally outward around the island pedicle utilizing iris scissors.  There was minimal undermining beneath the pedicle flap.
Mercedes Flap Text: The defect edges were debeveled with a #15 scalpel blade.  Given the location of the defect, shape of the defect and the proximity to free margins a Mercedes flap was deemed most appropriate.  Using a sterile surgical marker, an appropriate advancement flap was drawn incorporating the defect and placing the expected incisions within the relaxed skin tension lines where possible. The area thus outlined was incised deep to adipose tissue with a #15 scalpel blade.  The skin margins were undermined to an appropriate distance in all directions utilizing iris scissors.
Epidermal Closure Graft Donor Site (Optional): simple interrupted
Complex Repair And Dorsal Nasal Flap Text: The defect edges were debeveled with a #15 scalpel blade.  The primary defect was closed partially with a complex linear closure.  Given the location of the remaining defect, shape of the defect and the proximity to free margins a dorsal nasal flap was deemed most appropriate for complete closure of the defect.  Using a sterile surgical marker, an appropriate flap was drawn incorporating the defect and placing the expected incisions within the relaxed skin tension lines where possible.    The area thus outlined was incised deep to adipose tissue with a #15 scalpel blade.  The skin margins were undermined to an appropriate distance in all directions utilizing iris scissors.
Crescentic Intermediate Repair Preamble Text (Leave Blank If You Do Not Want): Undermining was performed with blunt dissection.
Anesthesia Volume In Cc: 5
Complex Repair And Epidermal Autograft Text: The defect edges were debeveled with a #15 scalpel blade.  The primary defect was closed partially with a complex linear closure.  Given the location of the defect, shape of the defect and the proximity to free margins an epidermal autograft was deemed most appropriate to repair the remaining defect.  The graft was trimmed to fit the size of the remaining defect.  The graft was then placed in the primary defect, oriented appropriately, and sutured into place.
Composite Graft Text: The defect edges were debeveled with a #15 scalpel blade.  Given the location of the defect, shape of the defect, the proximity to free margins and the fact the defect was full thickness a composite graft was deemed most appropriate.  The defect was outline and then transferred to the donor site.  A full thickness graft was then excised from the donor site. The graft was then placed in the primary defect, oriented appropriately and then sutured into place.  The secondary defect was then repaired using a primary closure.
Complex Repair And Double M Plasty Text: The defect edges were debeveled with a #15 scalpel blade.  The primary defect was closed partially with a complex linear closure.  Given the location of the remaining defect, shape of the defect and the proximity to free margins a double M plasty was deemed most appropriate for complete closure of the defect.  Using a sterile surgical marker, an appropriate advancement flap was drawn incorporating the defect and placing the expected incisions within the relaxed skin tension lines where possible.    The area thus outlined was incised deep to adipose tissue with a #15 scalpel blade.  The skin margins were undermined to an appropriate distance in all directions utilizing iris scissors.
Fusiform Excision Additional Text (Leave Blank If You Do Not Want): The margin was drawn around the clinically apparent lesion.  A fusiform shape was then drawn on the skin incorporating the lesion and margins.  Incisions were then made along these lines to the appropriate tissue plane and the lesion was extirpated.
Interpolation Flap Text: A decision was made to reconstruct the defect utilizing an interpolation axial flap and a staged reconstruction.  A telfa template was made of the defect.  This telfa template was then used to outline the interpolation flap.  The donor area for the pedicle flap was then injected with anesthesia.  The flap was excised through the skin and subcutaneous tissue down to the layer of the underlying musculature.  The interpolation flap was carefully excised within this deep plane to maintain its blood supply.  The edges of the donor site were undermined.   The donor site was closed in a primary fashion.  The pedicle was then rotated into position and sutured.  Once the tube was sutured into place, adequate blood supply was confirmed with blanching and refill.  The pedicle was then wrapped with xeroform gauze and dressed appropriately with a telfa and gauze bandage to ensure continued blood supply and protect the attached pedicle.
Helical Rim Advancement Flap Text: The defect edges were debeveled with a #15 blade scalpel.  Given the location of the defect and the proximity to free margins (helical rim) a double helical rim advancement flap was deemed most appropriate.  Using a sterile surgical marker, the appropriate advancement flaps were drawn incorporating the defect and placing the expected incisions between the helical rim and antihelix where possible.  The area thus outlined was incised through and through with a #15 scalpel blade.  With a skin hook and iris scissors, the flaps were gently and sharply undermined and freed up.
O-Z Plasty Text: The defect edges were debeveled with a #15 scalpel blade.  Given the location of the defect, shape of the defect and the proximity to free margins an O-Z plasty (double transposition flap) was deemed most appropriate.  Using a sterile surgical marker, the appropriate transposition flaps were drawn incorporating the defect and placing the expected incisions within the relaxed skin tension lines where possible.    The area thus outlined was incised deep to adipose tissue with a #15 scalpel blade.  The skin margins were undermined to an appropriate distance in all directions utilizing iris scissors.  Hemostasis was achieved with electrocautery.  The flaps were then transposed into place, one clockwise and the other counterclockwise, and anchored with interrupted buried subcutaneous sutures.
Posterior Auricular Interpolation Flap Text: A decision was made to reconstruct the defect utilizing an interpolation axial flap and a staged reconstruction.  A telfa template was made of the defect.  This telfa template was then used to outline the posterior auricular interpolation flap.  The donor area for the pedicle flap was then injected with anesthesia.  The flap was excised through the skin and subcutaneous tissue down to the layer of the underlying musculature.  The pedicle flap was carefully excised within this deep plane to maintain its blood supply.  The edges of the donor site were undermined.   The donor site was closed in a primary fashion.  The pedicle was then rotated into position and sutured.  Once the tube was sutured into place, adequate blood supply was confirmed with blanching and refill.  The pedicle was then wrapped with xeroform gauze and dressed appropriately with a telfa and gauze bandage to ensure continued blood supply and protect the attached pedicle.
Repair Type: Intermediate
Mastoid Interpolation Flap Text: A decision was made to reconstruct the defect utilizing an interpolation axial flap and a staged reconstruction.  A telfa template was made of the defect.  This telfa template was then used to outline the mastoid interpolation flap.  The donor area for the pedicle flap was then injected with anesthesia.  The flap was excised through the skin and subcutaneous tissue down to the layer of the underlying musculature.  The pedicle flap was carefully excised within this deep plane to maintain its blood supply.  The edges of the donor site were undermined.   The donor site was closed in a primary fashion.  The pedicle was then rotated into position and sutured.  Once the tube was sutured into place, adequate blood supply was confirmed with blanching and refill.  The pedicle was then wrapped with xeroform gauze and dressed appropriately with a telfa and gauze bandage to ensure continued blood supply and protect the attached pedicle.
Complex Repair And Transposition Flap Text: The defect edges were debeveled with a #15 scalpel blade.  The primary defect was closed partially with a complex linear closure.  Given the location of the remaining defect, shape of the defect and the proximity to free margins a transposition flap was deemed most appropriate for complete closure of the defect.  Using a sterile surgical marker, an appropriate advancement flap was drawn incorporating the defect and placing the expected incisions within the relaxed skin tension lines where possible.    The area thus outlined was incised deep to adipose tissue with a #15 scalpel blade.  The skin margins were undermined to an appropriate distance in all directions utilizing iris scissors.
Lip Wedge Excision Repair Text: Given the location of the defect and the proximity to free margins a full thickness wedge repair was deemed most appropriate.  Using a sterile surgical marker, the appropriate repair was drawn incorporating the defect and placing the expected incisions perpendicular to the vermillion border.  The vermillion border was also meticulously outlined to ensure appropriate reapproximation during the repair.  The area thus outlined was incised through and through with a #15 scalpel blade.  The muscularis and dermis were reaproximated with deep sutures following hemostasis. Care was taken to realign the vermillion border before proceeding with the superficial closure.  Once the vermillion was realigned the superfical and mucosal closure was finished.
Pre-Excision Curettage Text (Leave Blank If You Do Not Want): Prior to drawing the surgical margin the visible lesion was removed with electrodesiccation and curettage to clearly define the lesion size.
Complex Repair And Split-Thickness Skin Graft Text: The defect edges were debeveled with a #15 scalpel blade.  The primary defect was closed partially with a complex linear closure.  Given the location of the defect, shape of the defect and the proximity to free margins a split thickness skin graft was deemed most appropriate to repair the remaining defect.  The graft was trimmed to fit the size of the remaining defect.  The graft was then placed in the primary defect, oriented appropriately, and sutured into place.
Ftsg Text: The defect edges were debeveled with a #15 scalpel blade.  Given the location of the defect, shape of the defect and the proximity to free margins a full thickness skin graft was deemed most appropriate.  Using a sterile surgical marker, the primary defect shape was transferred to the donor site. The area thus outlined was incised deep to adipose tissue with a #15 scalpel blade.  The harvested graft was then trimmed of adipose tissue until only dermis and epidermis was left.  The skin margins of the secondary defect were undermined to an appropriate distance in all directions utilizing iris scissors.  The secondary defect was closed with interrupted buried subcutaneous sutures.  The skin edges were then re-apposed with running  sutures.  The skin graft was then placed in the primary defect and oriented appropriately.
Mucosal Advancement Flap Text: Given the location of the defect, shape of the defect and the proximity to free margins a mucosal advancement flap was deemed most appropriate. Incisions were made with a 15 blade scalpel in the appropriate fashion along the cutaneous vermillion border and the mucosal lip. The remaining actinically damaged mucosal tissue was excised.  The mucosal advancement flap was then elevated to the gingival sulcus with care taken to preserve the neurovascular structures and advanced into the primary defect. Care was taken to ensure that precise realignment of the vermillion border was achieved.
Purse String (Simple) Text: Given the location of the defect and the characteristics of the surrounding skin a purse string simple closure was deemed most appropriate.  Undermining was performed circumferentially around the surgical defect.  A purse string suture was then placed and tightened.
Repair Performed By Another Provider Text (Leave Blank If You Do Not Want): After the tissue was excised the defect was repaired by another provider.
Complex Repair And Melolabial Flap Text: The defect edges were debeveled with a #15 scalpel blade.  The primary defect was closed partially with a complex linear closure.  Given the location of the remaining defect, shape of the defect and the proximity to free margins a melolabial flap was deemed most appropriate for complete closure of the defect.  Using a sterile surgical marker, an appropriate advancement flap was drawn incorporating the defect and placing the expected incisions within the relaxed skin tension lines where possible.    The area thus outlined was incised deep to adipose tissue with a #15 scalpel blade.  The skin margins were undermined to an appropriate distance in all directions utilizing iris scissors.
Muscle Hinge Flap Text: The defect edges were debeveled with a #15 scalpel blade.  Given the size, depth and location of the defect and the proximity to free margins a muscle hinge flap was deemed most appropriate.  Using a sterile surgical marker, an appropriate hinge flap was drawn incorporating the defect. The area thus outlined was incised with a #15 scalpel blade.  The skin margins were undermined to an appropriate distance in all directions utilizing iris scissors.
Complex Repair And Z Plasty Text: The defect edges were debeveled with a #15 scalpel blade.  The primary defect was closed partially with a complex linear closure.  Given the location of the remaining defect, shape of the defect and the proximity to free margins a Z plasty was deemed most appropriate for complete closure of the defect.  Using a sterile surgical marker, an appropriate advancement flap was drawn incorporating the defect and placing the expected incisions within the relaxed skin tension lines where possible.    The area thus outlined was incised deep to adipose tissue with a #15 scalpel blade.  The skin margins were undermined to an appropriate distance in all directions utilizing iris scissors.
O-T Advancement Flap Text: The defect edges were debeveled with a #15 scalpel blade.  Given the location of the defect, shape of the defect and the proximity to free margins an O-T advancement flap was deemed most appropriate.  Using a sterile surgical marker, an appropriate advancement flap was drawn incorporating the defect and placing the expected incisions within the relaxed skin tension lines where possible.    The area thus outlined was incised deep to adipose tissue with a #15 scalpel blade.  The skin margins were undermined to an appropriate distance in all directions utilizing iris scissors.
Complex Repair And M Plasty Text: The defect edges were debeveled with a #15 scalpel blade.  The primary defect was closed partially with a complex linear closure.  Given the location of the remaining defect, shape of the defect and the proximity to free margins an M plasty was deemed most appropriate for complete closure of the defect.  Using a sterile surgical marker, an appropriate advancement flap was drawn incorporating the defect and placing the expected incisions within the relaxed skin tension lines where possible.    The area thus outlined was incised deep to adipose tissue with a #15 scalpel blade.  The skin margins were undermined to an appropriate distance in all directions utilizing iris scissors.
Complex Repair And O-T Advancement Flap Text: The defect edges were debeveled with a #15 scalpel blade.  The primary defect was closed partially with a complex linear closure.  Given the location of the remaining defect, shape of the defect and the proximity to free margins an O-T advancement flap was deemed most appropriate for complete closure of the defect.  Using a sterile surgical marker, an appropriate advancement flap was drawn incorporating the defect and placing the expected incisions within the relaxed skin tension lines where possible.    The area thus outlined was incised deep to adipose tissue with a #15 scalpel blade.  The skin margins were undermined to an appropriate distance in all directions utilizing iris scissors.
Banner Transposition Flap Text: The defect edges were debeveled with a #15 scalpel blade.  Given the location of the defect and the proximity to free margins a Banner transposition flap was deemed most appropriate.  Using a sterile surgical marker, an appropriate flap drawn around the defect. The area thus outlined was incised deep to adipose tissue with a #15 scalpel blade.  The skin margins were undermined to an appropriate distance in all directions utilizing iris scissors.
Split-Thickness Skin Graft Text: The defect edges were debeveled with a #15 scalpel blade.  Given the location of the defect, shape of the defect and the proximity to free margins a split thickness skin graft was deemed most appropriate.  Using a sterile surgical marker, the primary defect shape was transferred to the donor site. The split thickness graft was then harvested.  The skin graft was then placed in the primary defect and oriented appropriately.
Saucerization Excision Additional Text (Leave Blank If You Do Not Want): The margin was drawn around the clinically apparent lesion.  Incisions were then made along these lines, in a tangential fashion, to the appropriate tissue plane and the lesion was extirpated.
Melolabial Interpolation Flap Text: A decision was made to reconstruct the defect utilizing an interpolation axial flap and a staged reconstruction.  A telfa template was made of the defect.  This telfa template was then used to outline the melolabial interpolation flap.  The donor area for the pedicle flap was then injected with anesthesia.  The flap was excised through the skin and subcutaneous tissue down to the layer of the underlying musculature.  The pedicle flap was carefully excised within this deep plane to maintain its blood supply.  The edges of the donor site were undermined.   The donor site was closed in a primary fashion.  The pedicle was then rotated into position and sutured.  Once the tube was sutured into place, adequate blood supply was confirmed with blanching and refill.  The pedicle was then wrapped with xeroform gauze and dressed appropriately with a telfa and gauze bandage to ensure continued blood supply and protect the attached pedicle.
Cheek-To-Nose Interpolation Flap Text: A decision was made to reconstruct the defect utilizing an interpolation axial flap and a staged reconstruction.  A telfa template was made of the defect.  This telfa template was then used to outline the Cheek-To-Nose Interpolation flap.  The donor area for the pedicle flap was then injected with anesthesia.  The flap was excised through the skin and subcutaneous tissue down to the layer of the underlying musculature.  The interpolation flap was carefully excised within this deep plane to maintain its blood supply.  The edges of the donor site were undermined.   The donor site was closed in a primary fashion.  The pedicle was then rotated into position and sutured.  Once the tube was sutured into place, adequate blood supply was confirmed with blanching and refill.  The pedicle was then wrapped with xeroform gauze and dressed appropriately with a telfa and gauze bandage to ensure continued blood supply and protect the attached pedicle.
A-T Advancement Flap Text: The defect edges were debeveled with a #15 scalpel blade.  Given the location of the defect, shape of the defect and the proximity to free margins an A-T advancement flap was deemed most appropriate.  Using a sterile surgical marker, an appropriate advancement flap was drawn incorporating the defect and placing the expected incisions within the relaxed skin tension lines where possible.    The area thus outlined was incised deep to adipose tissue with a #15 scalpel blade.  The skin margins were undermined to an appropriate distance in all directions utilizing iris scissors.
Melolabial Transposition Flap Text: The defect edges were debeveled with a #15 scalpel blade.  Given the location of the defect and the proximity to free margins a melolabial flap was deemed most appropriate.  Using a sterile surgical marker, an appropriate melolabial transposition flap was drawn incorporating the defect.    The area thus outlined was incised deep to adipose tissue with a #15 scalpel blade.  The skin margins were undermined to an appropriate distance in all directions utilizing iris scissors.
Home Suture Removal Text: Patient was provided a home suture removal kit and will remove their sutures at home.  If they have any questions or difficulties they will call the office.
Estimated Blood Loss (Cc): minimal
Complex Repair And W Plasty Text: The defect edges were debeveled with a #15 scalpel blade.  The primary defect was closed partially with a complex linear closure.  Given the location of the remaining defect, shape of the defect and the proximity to free margins a W plasty was deemed most appropriate for complete closure of the defect.  Using a sterile surgical marker, an appropriate advancement flap was drawn incorporating the defect and placing the expected incisions within the relaxed skin tension lines where possible.    The area thus outlined was incised deep to adipose tissue with a #15 scalpel blade.  The skin margins were undermined to an appropriate distance in all directions utilizing iris scissors.
Complex Repair And A-T Advancement Flap Text: The defect edges were debeveled with a #15 scalpel blade.  The primary defect was closed partially with a complex linear closure.  Given the location of the remaining defect, shape of the defect and the proximity to free margins an A-T advancement flap was deemed most appropriate for complete closure of the defect.  Using a sterile surgical marker, an appropriate advancement flap was drawn incorporating the defect and placing the expected incisions within the relaxed skin tension lines where possible.    The area thus outlined was incised deep to adipose tissue with a #15 scalpel blade.  The skin margins were undermined to an appropriate distance in all directions utilizing iris scissors.
W Plasty Text: The lesion was extirpated to the level of the fat with a #15 scalpel blade.  Given the location of the defect, shape of the defect and the proximity to free margins a W-plasty was deemed most appropriate for repair.  Using a sterile surgical marker, the appropriate transposition arms of the W-plasty were drawn incorporating the defect and placing the expected incisions within the relaxed skin tension lines where possible.    The area thus outlined was incised deep to adipose tissue with a #15 scalpel blade.  The skin margins were undermined to an appropriate distance in all directions utilizing iris scissors.  The opposing transposition arms were then transposed into place in opposite direction and anchored with interrupted buried subcutaneous sutures.
Excision Depth: adipose tissue
Positioning (Leave Blank If You Do Not Want): The patient was placed in a comfortable position exposing the surgical site.
Consent was obtained from the patient. The risks and benefits to therapy were discussed in detail. Specifically, the risks of infection, scarring, bleeding, prolonged wound healing, incomplete removal, allergy to anesthesia, nerve injury and recurrence were addressed. Prior to the procedure, the treatment site was clearly identified and confirmed by the patient. All components of Universal Protocol/PAUSE Rule completed.
Complex Repair And Skin Substitute Graft Text: The defect edges were debeveled with a #15 scalpel blade.  The primary defect was closed partially with a complex linear closure.  Given the location of the remaining defect, shape of the defect and the proximity to free margins a skin substitute graft was deemed most appropriate to repair the remaining defect.  The graft was trimmed to fit the size of the remaining defect.  The graft was then placed in the primary defect, oriented appropriately, and sutured into place.
Keystone Flap Text: The defect edges were debeveled with a #15 scalpel blade.  Given the location of the defect, shape of the defect a keystone flap was deemed most appropriate.  Using a sterile surgical marker, an appropriate keystone flap was drawn incorporating the defect, outlining the appropriate donor tissue and placing the expected incisions within the relaxed skin tension lines where possible. The area thus outlined was incised deep to adipose tissue with a #15 scalpel blade.  The skin margins were undermined to an appropriate distance in all directions around the primary defect and laterally outward around the flap utilizing iris scissors.
Complex Repair And Single Advancement Flap Text: The defect edges were debeveled with a #15 scalpel blade.  The primary defect was closed partially with a complex linear closure.  Given the location of the remaining defect, shape of the defect and the proximity to free margins a single advancement flap was deemed most appropriate for complete closure of the defect.  Using a sterile surgical marker, an appropriate advancement flap was drawn incorporating the defect and placing the expected incisions within the relaxed skin tension lines where possible.    The area thus outlined was incised deep to adipose tissue with a #15 scalpel blade.  The skin margins were undermined to an appropriate distance in all directions utilizing iris scissors.
Post-Care Instructions: I reviewed with the patient in detail post-care instructions. Patient is not to engage in any heavy lifting, exercise, or swimming for the next 7 days. Should the patient develop any fevers, chills, bleeding, severe pain patient will contact the office immediately.
Slit Excision Additional Text (Leave Blank If You Do Not Want): A linear line was drawn on the skin overlying the lesion. An incision was made slowly until the lesion was visualized.  Once visualized, the lesion was removed with blunt dissection.
Complex Repair And Double Advancement Flap Text: The defect edges were debeveled with a #15 scalpel blade.  The primary defect was closed partially with a complex linear closure.  Given the location of the remaining defect, shape of the defect and the proximity to free margins a double advancement flap was deemed most appropriate for complete closure of the defect.  Using a sterile surgical marker, an appropriate advancement flap was drawn incorporating the defect and placing the expected incisions within the relaxed skin tension lines where possible.    The area thus outlined was incised deep to adipose tissue with a #15 scalpel blade.  The skin margins were undermined to an appropriate distance in all directions utilizing iris scissors.
Size Of Lesion In Cm: 1.1
Advancement Flap (Single) Text: The defect edges were debeveled with a #15 scalpel blade.  Given the location of the defect and the proximity to free margins a single advancement flap was deemed most appropriate.  Using a sterile surgical marker, an appropriate advancement flap was drawn incorporating the defect and placing the expected incisions within the relaxed skin tension lines where possible.    The area thus outlined was incised deep to adipose tissue with a #15 scalpel blade.  The skin margins were undermined to an appropriate distance in all directions utilizing iris scissors.
O-T Plasty Text: The defect edges were debeveled with a #15 scalpel blade.  Given the location of the defect, shape of the defect and the proximity to free margins an O-T plasty was deemed most appropriate.  Using a sterile surgical marker, an appropriate O-T plasty was drawn incorporating the defect and placing the expected incisions within the relaxed skin tension lines where possible.    The area thus outlined was incised deep to adipose tissue with a #15 scalpel blade.  The skin margins were undermined to an appropriate distance in all directions utilizing iris scissors.
Size Of Margin In Cm: 0.2
Xenograft Text: The defect edges were debeveled with a #15 scalpel blade.  Given the location of the defect, shape of the defect and the proximity to free margins a xenograft was deemed most appropriate.  The graft was then trimmed to fit the size of the defect.  The graft was then placed in the primary defect and oriented appropriately.
Dressing: pressure dressing
Epidermal Autograft Text: The defect edges were debeveled with a #15 scalpel blade.  Given the location of the defect, shape of the defect and the proximity to free margins an epidermal autograft was deemed most appropriate.  Using a sterile surgical marker, the primary defect shape was transferred to the donor site. The epidermal graft was then harvested.  The skin graft was then placed in the primary defect and oriented appropriately.
Star Wedge Flap Text: The defect edges were debeveled with a #15 scalpel blade.  Given the location of the defect, shape of the defect and the proximity to free margins a star wedge flap was deemed most appropriate.  Using a sterile surgical marker, an appropriate rotation flap was drawn incorporating the defect and placing the expected incisions within the relaxed skin tension lines where possible. The area thus outlined was incised deep to adipose tissue with a #15 scalpel blade.  The skin margins were undermined to an appropriate distance in all directions utilizing iris scissors.
Transposition Flap Text: The defect edges were debeveled with a #15 scalpel blade.  Given the location of the defect and the proximity to free margins a transposition flap was deemed most appropriate.  Using a sterile surgical marker, an appropriate transposition flap was drawn incorporating the defect.    The area thus outlined was incised deep to adipose tissue with a #15 scalpel blade.  The skin margins were undermined to an appropriate distance in all directions utilizing iris scissors.
Rotation Flap Text: The defect edges were debeveled with a #15 scalpel blade.  Given the location of the defect, shape of the defect and the proximity to free margins a rotation flap was deemed most appropriate.  Using a sterile surgical marker, an appropriate rotation flap was drawn incorporating the defect and placing the expected incisions within the relaxed skin tension lines where possible.    The area thus outlined was incised deep to adipose tissue with a #15 scalpel blade.  The skin margins were undermined to an appropriate distance in all directions utilizing iris scissors.
Tissue Cultured Epidermal Autograft Text: The defect edges were debeveled with a #15 scalpel blade.  Given the location of the defect, shape of the defect and the proximity to free margins a tissue cultured epidermal autograft was deemed most appropriate.  The graft was then trimmed to fit the size of the defect.  The graft was then placed in the primary defect and oriented appropriately.
Excision Method: Elliptical
Complex Repair And Modified Advancement Flap Text: The defect edges were debeveled with a #15 scalpel blade.  The primary defect was closed partially with a complex linear closure.  Given the location of the remaining defect, shape of the defect and the proximity to free margins a modified advancement flap was deemed most appropriate for complete closure of the defect.  Using a sterile surgical marker, an appropriate advancement flap was drawn incorporating the defect and placing the expected incisions within the relaxed skin tension lines where possible.    The area thus outlined was incised deep to adipose tissue with a #15 scalpel blade.  The skin margins were undermined to an appropriate distance in all directions utilizing iris scissors.
Complex Repair And Ftsg Text: The defect edges were debeveled with a #15 scalpel blade.  The primary defect was closed partially with a complex linear closure.  Given the location of the defect, shape of the defect and the proximity to free margins a full thickness skin graft was deemed most appropriate to repair the remaining defect.  The graft was trimmed to fit the size of the remaining defect.  The graft was then placed in the primary defect, oriented appropriately, and sutured into place.
Complex Repair And Rhombic Flap Text: The defect edges were debeveled with a #15 scalpel blade.  The primary defect was closed partially with a complex linear closure.  Given the location of the remaining defect, shape of the defect and the proximity to free margins a rhombic flap was deemed most appropriate for complete closure of the defect.  Using a sterile surgical marker, an appropriate advancement flap was drawn incorporating the defect and placing the expected incisions within the relaxed skin tension lines where possible.    The area thus outlined was incised deep to adipose tissue with a #15 scalpel blade.  The skin margins were undermined to an appropriate distance in all directions utilizing iris scissors.
Crescentic Complex Repair Preamble Text (Leave Blank If You Do Not Want): Extensive wide undermining was performed.
Trilobed Flap Text: The defect edges were debeveled with a #15 scalpel blade.  Given the location of the defect and the proximity to free margins a trilobed flap was deemed most appropriate.  Using a sterile surgical marker, an appropriate trilobed flap drawn around the defect.    The area thus outlined was incised deep to adipose tissue with a #15 scalpel blade.  The skin margins were undermined to an appropriate distance in all directions utilizing iris scissors.
V-Y Flap Text: The defect edges were debeveled with a #15 scalpel blade.  Given the location of the defect, shape of the defect and the proximity to free margins a V-Y flap was deemed most appropriate.  Using a sterile surgical marker, an appropriate advancement flap was drawn incorporating the defect and placing the expected incisions within the relaxed skin tension lines where possible.    The area thus outlined was incised deep to adipose tissue with a #15 scalpel blade.  The skin margins were undermined to an appropriate distance in all directions utilizing iris scissors.
V-Y Plasty Text: The defect edges were debeveled with a #15 scalpel blade.  Given the location of the defect, shape of the defect and the proximity to free margins an V-Y advancement flap was deemed most appropriate.  Using a sterile surgical marker, an appropriate advancement flap was drawn incorporating the defect and placing the expected incisions within the relaxed skin tension lines where possible.    The area thus outlined was incised deep to adipose tissue with a #15 scalpel blade.  The skin margins were undermined to an appropriate distance in all directions utilizing iris scissors.
Suture Removal: 14 days
Accession #: [id number]
Spiral Flap Text: The defect edges were debeveled with a #15 scalpel blade.  Given the location of the defect, shape of the defect and the proximity to free margins a spiral flap was deemed most appropriate.  Using a sterile surgical marker, an appropriate rotation flap was drawn incorporating the defect and placing the expected incisions within the relaxed skin tension lines where possible. The area thus outlined was incised deep to adipose tissue with a #15 scalpel blade.  The skin margins were undermined to an appropriate distance in all directions utilizing iris scissors.
Partial Purse String (Simple) Text: Given the location of the defect and the characteristics of the surrounding skin a simple purse string closure was deemed most appropriate.  Undermining was performed circumferentially around the surgical defect.  A purse string suture was then placed and tightened. Wound tension of the circular defect prevented complete closure of the wound.
Island Pedicle Flap Text: The defect edges were debeveled with a #15 scalpel blade.  Given the location of the defect, shape of the defect and the proximity to free margins an island pedicle advancement flap was deemed most appropriate.  Using a sterile surgical marker, an appropriate advancement flap was drawn incorporating the defect, outlining the appropriate donor tissue and placing the expected incisions within the relaxed skin tension lines where possible.    The area thus outlined was incised deep to adipose tissue with a #15 scalpel blade.  The skin margins were undermined to an appropriate distance in all directions around the primary defect and laterally outward around the island pedicle utilizing iris scissors.  There was minimal undermining beneath the pedicle flap.
Bilateral Helical Rim Advancement Flap Text: The defect edges were debeveled with a #15 blade scalpel.  Given the location of the defect and the proximity to free margins (helical rim) a bilateral helical rim advancement flap was deemed most appropriate.  Using a sterile surgical marker, the appropriate advancement flaps were drawn incorporating the defect and placing the expected incisions between the helical rim and antihelix where possible.  The area thus outlined was incised through and through with a #15 scalpel blade.  With a skin hook and iris scissors, the flaps were gently and sharply undermined and freed up.
Graft Donor Site Bandage (Optional-Leave Blank If You Don't Want In Note): Steri-strips and a pressure bandage were applied to the donor site.
Hatchet Flap Text: The defect edges were debeveled with a #15 scalpel blade.  Given the location of the defect, shape of the defect and the proximity to free margins a hatchet flap was deemed most appropriate.  Using a sterile surgical marker, an appropriate hatchet flap was drawn incorporating the defect and placing the expected incisions within the relaxed skin tension lines where possible.    The area thus outlined was incised deep to adipose tissue with a #15 scalpel blade.  The skin margins were undermined to an appropriate distance in all directions utilizing iris scissors.
Perilesional Excision Additional Text (Leave Blank If You Do Not Want): The margin was drawn around the clinically apparent lesion. Incisions were then made along these lines to the appropriate tissue plane and the lesion was extirpated.
Complex Repair And Bilobe Flap Text: The defect edges were debeveled with a #15 scalpel blade.  The primary defect was closed partially with a complex linear closure.  Given the location of the remaining defect, shape of the defect and the proximity to free margins a bilobe flap was deemed most appropriate for complete closure of the defect.  Using a sterile surgical marker, an appropriate advancement flap was drawn incorporating the defect and placing the expected incisions within the relaxed skin tension lines where possible.    The area thus outlined was incised deep to adipose tissue with a #15 scalpel blade.  The skin margins were undermined to an appropriate distance in all directions utilizing iris scissors.
Partial Purse String (Intermediate) Text: Given the location of the defect and the characteristics of the surrounding skin an intermediate purse string closure was deemed most appropriate.  Undermining was performed circumferentially around the surgical defect.  A purse string suture was then placed and tightened. Wound tension of the circular defect prevented complete closure of the wound.
Wound Care: Vaseline
Ear Star Wedge Flap Text: The defect edges were debeveled with a #15 blade scalpel.  Given the location of the defect and the proximity to free margins (helical rim) an ear star wedge flap was deemed most appropriate.  Using a sterile surgical marker, the appropriate flap was drawn incorporating the defect and placing the expected incisions between the helical rim and antihelix where possible.  The area thus outlined was incised through and through with a #15 scalpel blade.
Purse String (Intermediate) Text: Given the location of the defect and the characteristics of the surrounding skin a pursestring intermediate closure was deemed most appropriate.  Undermining was performed circumfirentially around the surgical defect.  A purstring suture was then placed and tightened.
Billing Type: Third-Party Bill
Modified Advancement Flap Text: The defect edges were debeveled with a #15 scalpel blade.  Given the location of the defect, shape of the defect and the proximity to free margins a modified advancement flap was deemed most appropriate.  Using a sterile surgical marker, an appropriate advancement flap was drawn incorporating the defect and placing the expected incisions within the relaxed skin tension lines where possible.    The area thus outlined was incised deep to adipose tissue with a #15 scalpel blade.  The skin margins were undermined to an appropriate distance in all directions utilizing iris scissors.
Bilobed Flap Text: The defect edges were debeveled with a #15 scalpel blade.  Given the location of the defect and the proximity to free margins a bilobe flap was deemed most appropriate.  Using a sterile surgical marker, an appropriate bilobe flap drawn around the defect.    The area thus outlined was incised deep to adipose tissue with a #15 scalpel blade.  The skin margins were undermined to an appropriate distance in all directions utilizing iris scissors.
Cheek Interpolation Flap Text: A decision was made to reconstruct the defect utilizing an interpolation axial flap and a staged reconstruction.  A telfa template was made of the defect.  This telfa template was then used to outline the Cheek Interpolation flap.  The donor area for the pedicle flap was then injected with anesthesia.  The flap was excised through the skin and subcutaneous tissue down to the layer of the underlying musculature.  The interpolation flap was carefully excised within this deep plane to maintain its blood supply.  The edges of the donor site were undermined.   The donor site was closed in a primary fashion.  The pedicle was then rotated into position and sutured.  Once the tube was sutured into place, adequate blood supply was confirmed with blanching and refill.  The pedicle was then wrapped with xeroform gauze and dressed appropriately with a telfa and gauze bandage to ensure continued blood supply and protect the attached pedicle.
Complex Repair And V-Y Plasty Text: The defect edges were debeveled with a #15 scalpel blade.  The primary defect was closed partially with a complex linear closure.  Given the location of the remaining defect, shape of the defect and the proximity to free margins a V-Y plasty was deemed most appropriate for complete closure of the defect.  Using a sterile surgical marker, an appropriate advancement flap was drawn incorporating the defect and placing the expected incisions within the relaxed skin tension lines where possible.    The area thus outlined was incised deep to adipose tissue with a #15 scalpel blade.  The skin margins were undermined to an appropriate distance in all directions utilizing iris scissors.
Advancement Flap (Double) Text: The defect edges were debeveled with a #15 scalpel blade.  Given the location of the defect and the proximity to free margins a double advancement flap was deemed most appropriate.  Using a sterile surgical marker, the appropriate advancement flaps were drawn incorporating the defect and placing the expected incisions within the relaxed skin tension lines where possible.    The area thus outlined was incised deep to adipose tissue with a #15 scalpel blade.  The skin margins were undermined to an appropriate distance in all directions utilizing iris scissors.
Curvilinear Excision Additional Text (Leave Blank If You Do Not Want): The margin was drawn around the clinically apparent lesion.  A curvilinear shape was then drawn on the skin incorporating the lesion and margins.  Incisions were then made along these lines to the appropriate tissue plane and the lesion was extirpated.
Crescentic Advancement Flap Text: The defect edges were debeveled with a #15 scalpel blade.  Given the location of the defect and the proximity to free margins a crescentic advancement flap was deemed most appropriate.  Using a sterile surgical marker, the appropriate advancement flap was drawn incorporating the defect and placing the expected incisions within the relaxed skin tension lines where possible.    The area thus outlined was incised deep to adipose tissue with a #15 scalpel blade.  The skin margins were undermined to an appropriate distance in all directions utilizing iris scissors.
Hemostasis: Electrocautery
No Repair - Repaired With Adjacent Surgical Defect Text (Leave Blank If You Do Not Want): After the excision the defect was repaired concurrently with another surgical defect which was in close approximation.
O-L Flap Text: The defect edges were debeveled with a #15 scalpel blade.  Given the location of the defect, shape of the defect and the proximity to free margins an O-L flap was deemed most appropriate.  Using a sterile surgical marker, an appropriate advancement flap was drawn incorporating the defect and placing the expected incisions within the relaxed skin tension lines where possible.    The area thus outlined was incised deep to adipose tissue with a #15 scalpel blade.  The skin margins were undermined to an appropriate distance in all directions utilizing iris scissors.
Epidermal Closure: running
S Plasty Text: Given the location and shape of the defect, and the orientation of relaxed skin tension lines, an S-plasty was deemed most appropriate for repair.  Using a sterile surgical marker, the appropriate outline of the S-plasty was drawn, incorporating the defect and placing the expected incisions within the relaxed skin tension lines where possible.  The area thus outlined was incised deep to adipose tissue with a #15 scalpel blade.  The skin margins were undermined to an appropriate distance in all directions utilizing iris scissors. The skin flaps were advanced over the defect.  The opposing margins were then approximated with interrupted buried subcutaneous sutures.
Scalpel Size: 15 blade
Alar Island Pedicle Flap Text: The defect edges were debeveled with a #15 scalpel blade.  Given the location of the defect, shape of the defect and the proximity to the alar rim an island pedicle advancement flap was deemed most appropriate.  Using a sterile surgical marker, an appropriate advancement flap was drawn incorporating the defect, outlining the appropriate donor tissue and placing the expected incisions within the nasal ala running parallel to the alar rim. The area thus outlined was incised with a #15 scalpel blade.  The skin margins were undermined minimally to an appropriate distance in all directions around the primary defect and laterally outward around the island pedicle utilizing iris scissors.  There was minimal undermining beneath the pedicle flap.
Detail Level: Detailed
H Plasty Text: Given the location of the defect, shape of the defect and the proximity to free margins a H-plasty was deemed most appropriate for repair.  Using a sterile surgical marker, the appropriate advancement arms of the H-plasty were drawn incorporating the defect and placing the expected incisions within the relaxed skin tension lines where possible. The area thus outlined was incised deep to adipose tissue with a #15 scalpel blade. The skin margins were undermined to an appropriate distance in all directions utilizing iris scissors.  The opposing advancement arms were then advanced into place in opposite direction and anchored with interrupted buried subcutaneous sutures.
Complex Repair And Rotation Flap Text: The defect edges were debeveled with a #15 scalpel blade.  The primary defect was closed partially with a complex linear closure.  Given the location of the remaining defect, shape of the defect and the proximity to free margins a rotation flap was deemed most appropriate for complete closure of the defect.  Using a sterile surgical marker, an appropriate advancement flap was drawn incorporating the defect and placing the expected incisions within the relaxed skin tension lines where possible.    The area thus outlined was incised deep to adipose tissue with a #15 scalpel blade.  The skin margins were undermined to an appropriate distance in all directions utilizing iris scissors.
Z Plasty Text: The lesion was extirpated to the level of the fat with a #15 scalpel blade.  Given the location of the defect, shape of the defect and the proximity to free margins a Z-plasty was deemed most appropriate for repair.  Using a sterile surgical marker, the appropriate transposition arms of the Z-plasty were drawn incorporating the defect and placing the expected incisions within the relaxed skin tension lines where possible.    The area thus outlined was incised deep to adipose tissue with a #15 scalpel blade.  The skin margins were undermined to an appropriate distance in all directions utilizing iris scissors.  The opposing transposition arms were then transposed into place in opposite direction and anchored with interrupted buried subcutaneous sutures.

## 2018-09-17 ENCOUNTER — HOSPITAL ENCOUNTER (OUTPATIENT)
Dept: PHYSICAL THERAPY | Age: 68
Discharge: HOME | End: 2018-09-17
Payer: MEDICARE

## 2018-09-17 PROCEDURE — 97110 THERAPEUTIC EXERCISES: CPT

## 2018-09-17 NOTE — PROGRESS NOTES
Eladio Prasad  : 1950  [place]  Secondary: [place] [address] [address] at . Kari Bernabe [address]  [facility] [address], [address] [facility]  [phone]   [phone]         OUTPATIENT PHYSICAL THERAPY:Daily Note 2018    ICD-10: Treatment Diagnosis:  Pain in left knee (M25.562)  Osteoarthritis of knee, unspecified (M17.9)  Stiffness of left knee, not elsewhere classified (M25.662)  Effusion, left knee (M25.462)  Back  Pain (M54.5)  Precautions/Allergies:   Lisinopril; Losartan; and Statins-hmg-coa reductase inhibitors   Fall Risk Score: 1 (? 5 = High Risk)  MD Orders: Eval and Treat MEDICAL/REFERRING DIAGNOSIS:  Unilateral primary osteoarthritis, left knee [M17.12]  Spinal stenosis, lumbar region with neurogenic claudication [M48.062]  Other intervertebral disc degeneration, lumbar region [M51.36]  DATE OF ONSET: three year history  REFERRING PHYSICIAN: Jesica Bautista., *  RETURN PHYSICIAN APPOINTMENT: to be determined     INITIAL ASSESSMENT:  Mr. Carlos Garrett presents to physical therapy with decreased strength, ROM, joint mobility, functional mobility . These S/S are consistent with left knee osteoarthritis. Patient will benefit from skilled physical therapy for manual therapeutic techniques (as appropriate), therapeutic exercises and activities, balance and comprehensive home exercises program to address current impairments and functional limitations. Eladio Prasad will benefit from skilled PT (medically necessary) in order to address above deficits affecting participation in basic ADLs and overall functional tolerance. PROBLEM LIST (Impacting functional limitations):   1. Decreased Strength  2. Decreased ADL/Functional Activities  3. Decreased Transfer Abilities  4. Decreased Ambulation Ability/Technique  5. Decreased Balance  6. Increased Pain  7. Decreased Activity Tolerance  8.  Decreased [place] with Home Exercise Program INTERVENTIONS PLANNED:  1. Balance Exercise  2. Family Education  3. Gait Training  4. Home Exercise Program (HEP)  5. Manual Therapy  6. Neuromuscular Re-education/Strengthening  7. Range of Motion (ROM)  8. Therapeutic Activites  9. Therapeutic Exercise/Strengthening  10. Transfer Training  11. TREATMENT PLAN:  Effective Dates: 8/23/2018 TO 9/22/2018 (30 days). Frequency/Duration: 2 times a week for 30 Days    GOALS: (Goals have been discussed and agreed upon with patient.)  Short-Term Functional Goals: Time Frame: 2 weeks  1. Isauro Bloodfidelinaod will be independent with HEP for strength and ROM  2. Sylviae Bloodgood will tolerate manual therapy/joint mobilizations to increase knee flexion ROM so pt can ambulate stairs and walk with a more normalized gait pattern. [address] [place] [address] will participate in LE strengthening exercises for hip, knee, ankle with weight as appropriate for 3 sets of 10.  [address] [place] [address] will tolerate scar massage as appropriate to improve tissue mobility with patient to perform independently after education  5. Isauro Bloodgood will participate in static and dynamic balance activities for 5 minutes to help improve proprioception and decrease risk of falls  6. Arlyce Bloodgood to increase lower extremity functional scale by 10 points to show improvement in areas of difficulty  7. Arlyce Bloodgood will demonstrate left knee flexion >=105 degrees to improve functional mobility and tolerance of ADLs. [address] [place] [address] 21 will demonstrate L knee extension >= 5 degrees to improve functional mobility and tolerance of ADLs  9. Arlyce Bloodgood will improve MMTL LE to >=4+/5 to improve current level of independence and community reintegration. Long-Term Goals: Time Frame:6   1. Isauro Bloodgood will be independent in HEP of stretching and strengthening   2.  Sylviae Bloodfidelinaod will ascend/descend 12 steps with reciprocal gait pattern and rail   [address] [place] [address] 21 to increase lower extremity functional scale by15 points to show improvement in areas of difficulty     Rehabilitation Potential For Stated Goals: Good  Regarding Adair Pratt's therapy, I certify that the treatment plan above will be carried out by a therapist or under their direction. Thank you for this referral,  Severiano Banrejee, RT     Referring Physician Signature: Lanny Kaba., *              Date                  HISTORY:   History of Present Injury/Illness (Reason for Referral):  Patient presents with three year history of worsening left knee pain, stiffness, swelling and lack of function. Is planning to have a total knee arthroplasty in the next four months. He is here for pre-op exercise to improve range of motion, strength and overall function. Mr Gaby Barbour also is complaining of left hip pain during walking.  -Present Symptoms (on day of evaluation):  Knee stiffness, swelling, and decreased walking ability. Pain;   · Currently: 5/10  · Best: 4/10  · Worst: 9/10  · Aggravating factors: walking, standing, squatting, climbing stairs. · Relieving factors: rest and medication. Past Medical History/Comorbidities:   Mr. Gaby Barbour  has a past medical history of Arthritis; CAD (coronary artery disease); Chest pain, unspecified (8/31/2012); Hyperlipidemia; Hypertension (8/31/2012); Mitral valve regurgitation; NSTEMI (non-ST elevated myocardial infarction) ([address] (9/2/2012); Post PTCA; Varicose veins of both lower extremities (7/6/2016); and Varicose veins of legs. He also has no past medical history of Chronic kidney disease or Stroke ([address]   Mr. Gaby Barbour  has a past surgical history that includes pr cardiac surg procedure unlist (2012); hx knee arthroscopy; hx other surgical; and hx other surgical.    Active Ambulatory Problems     Diagnosis Date Noted    Hypertension 08/31/2012    CAD (coronary artery disease) 09/02/2012    Hyperlipidemia     Mitral valve regurgitation     Varicose veins of both lower extremities 07/06/2016     Resolved Ambulatory Problems     Diagnosis Date Noted    NSTEMI (non-ST elevated myocardial infarction) [facility] 09/02/2012    Post PTCA      Past Medical History:   Diagnosis Date    Arthritis     CAD (coronary artery disease)     Chest pain, unspecified 8/31/2012    Hyperlipidemia     Hypertension 8/31/2012    Mitral valve regurgitation     NSTEMI (non-ST elevated myocardial infarction) ([facility] 9/2/2012    Post PTCA     Varicose veins of both lower extremities 7/6/2016    Varicose veins of legs      Social History/Living Environment:        Social History     Social History    Marital status:      Spouse name: N/A    Number of children: N/A    Years of education: N/A     Occupational History    Not on file. Social History Main Topics    Smoking status: Former Smoker     Quit date: 10/5/1981    Smokeless tobacco: Never Used    Alcohol use Yes      Comment: occ    Drug use: No    Sexual activity: Not on file     Other Topics Concern    Not on file     Social History Narrative     Prior Level of Function/Work/Activity:  Retired . Previous Treatment Approach  Antiinflammatories, joint injections    Current Medications:    Current Outpatient Prescriptions:     b complex vitamins tablet, Take 1 Tab by mouth daily. , Disp: , Rfl:     Cetirizine (ZYRTEC) 10 mg cap, Take  by mouth., Disp: , Rfl:     amLODIPine (NORVASC) 5 mg tablet, Take 1 Tab by mouth as needed. , Disp: 90 Tab, Rfl: 3    nitroglycerin (NITROSTAT) 0.4 mg SL tablet, 1 Tab by SubLINGual route every five (5) minutes as needed for Chest Pain., Disp: 25 Tab, Rfl: 5    magnesium 250 mg tab, Take  by mouth., Disp: , Rfl:     AMINO ACIDS/MV,IRON,MIN (OCUVITE EXTRA PO), Take  by mouth., Disp: , Rfl:     coenzyme Q-10 (CO Q-10) 200 mg capsule, Take  by mouth daily. 2qd, Disp: , Rfl:     aspirin 81 mg chewable tablet, Take 1 Tab by mouth daily. , Disp: , Rfl:      Date Last Reviewed:  9/17/2018   Number of Personal Factors/Comorbidities that affect the Plan of Care: 1-2: MODERATE COMPLEXITY   EXAMINATION:   Observation/Orthostatic Postural Assessment:   Gait:  Antalgic, stiff knee gait, uses straight cane. AROM/PROM         Joint: Eval Date:   8/23/18  Re-Assess Date:  8/30/18  Re-Assess Date:    Active ROM RIGHT LEFT RIGHT LEFT RIGHT LEFT   Knee Extension 0 -20       Knee Flexion 133 90       Hip Flexion         Ankle mobility         Lumbar flexion   50 no pain      extension   5 mild pain central                        Passive ROM         Knee Extension 0 -10       Knee Flexion 135 100         Strength:     Eval Date:  8/23/18  Re-Assess Date:  Re-Assess Date:      RIGHT LEFT RIGHT LEFT RIGHT LEFT   Knee Flexion 5/5 3+/5       Knee Extension 5/5 3+/5       Hip Flexion 4/5 4/5       Hip Abduction 4/5 3+/5       Hip Extension 3+/5 3+/5       Ankle Dorsiflexion                        Joint Mobility Eval Date:  Re-Assess Date:  Re-Assess Date:     RIGHT LEFT RIGHT LEFT RIGHT LEFT   Tibiofemoral  hypomobility       Patellofemoral  hypomobility       Tibiofibular         Talocrual               Neurological Screen:  No radiating symptoms down leg  Functional Mobility: Limited tolerance of walking and standing. TUG test: 13.30 sec  Sit to Stand= keeps left leg extended but able to stand without use of hands. Squat= limtied by 50% decreased weight bearing left LE, decreased knee flexion. Balance:    Single leg stance: R= 30 sec / L=2 sec   Body Structures Involved:  1. Joints  2. Muscles  3. Ligaments Body Functions Affected:  1. Sensory/Pain  2. Neuromusculoskeletal  3. Movement Related Activities and Participation Affected:  1. Mobility  2. Self Care   Number of elements that affect the Plan of Care: 4+: HIGH COMPLEXITY   CLINICAL PRESENTATION:   Presentation: Evolving clinical presentation with changing clinical characteristics: MODERATE COMPLEXITY   CLINICAL DECISION MAKING:   Outcome Measure:    Tool Used: Lower Extremity Functional Scale (LEFS)  Score:  Initial: 31/80 Most Recent: X/80 (Date: -- )   Interpretation of Score: 20 questions each scored on a 5 point scale with 0 representing \"extreme difficulty or unable to perform\" and 4 representing \"no difficulty\". The lower the score, the greater the functional disability. 80/80 represents no disability. Minimal detectable change is 9 points. Score 80 79-63 62-48 47-32 31-16 15-1 0   Modifier CH CI CJ CK CL CM CN     ? Mobility - Walking and Moving Around:     - CURRENT STATUS: CL - 60%-79% impaired, limited or restricted    - GOAL STATUS: CK - 40%-59% impaired, limited or restricted    - D/C STATUS:  ---------------To be determined---------------    Medical Necessity:   · Skilled intervention continues to be required due to current impairment. Reason for Services/Other Comments:  · Patient continues to require skilled intervention due to medical complications and patient unable to attend/participate in therapy as expected. TREATMENT:   (In addition to Assessment/Re-Assessment sessions the following treatments were rendered)    · Pre-Treatment Pain/ Symptoms: Had surgery on back for basal cell tumor needs to cut down on ex today. THERAPEUTIC EXERCISE: (45 minutes):  Exercises per grid below to improve mobility, strength and balance. Required minimal visual and verbal cues to promote proper body alignment, promote proper body posture and promote proper body mechanics. Progressed resistance, range and repetitions as indicated.        Date:  8/23/18 Date:  8/28/18 Date:  8/30/18 Date  9/5/18 Date  9/7/18 Date  9/10/18 Date  9/12/18 Date  9/17/18   Activity/Exercise Parameters Parameters Parameters Parameters parameters parameters parameters parameters   Patient education Treatment expectations, pre/post op expectations, HEP Progression of HEP HEP progression  HEP progressin      Heel slide 10 x 10 x         Quad sets 10 x 20 x  20 x       SAQ 2 x 10 2 x 10 with laser feedback 3 x 15 with laser 3 x 15 with laser       SLR 2 x 10  20 x 20 x 30 x With laser   30 x    Knee extension stretch 2 min 3 min   3 min      Nu step 8 min 8 min 8 min 10 min 10 min 10 min level 4.6 10 min 4.6 level 10 min   bridges  20 x 20 x 30 x With band above kneesknee      clams  20 x 20 x        Hip abd standing   Red band 20x ea 25 x ea Lateral walk 3 min orange band above knees 3 min 3 min 5 min   Therapy ball knees to chest    3 min 30 x with hip knee ext to flex 30 x      Ball rotation with knees   3 min 30 x       Hip hinge stretch   6 x  6 x 10 x 10 x 10 x   Standing TKES with band    30 x blue band Heavy red band Heavy black band 3 min 4 min heavy black 2 x 20 heavy black bans   Standing knee flex lunge position    Pain free ROM with laser guidance 20 x       Hip hinge with pole     20 x 20 x 20 x    LAQ     30 x with laser for terminal extension  5 lbs 10 sec  5lbs with laser feedback 3 x 20   Calf raise      On stair 10 x 12 x  20 x   Dead lift       10 lbs 2 x 10 Black heavy band  2 x 10     Shuttle squat      30 x 3 cords 30 x 4 cords 30 x 4 cords   Therapy ball sitting flexion stretch      5 min     Tilt board        4 min    Lateral walk        Orange band 100 ft   Monster walk        Dundy band 50 ft     [employer] Portal      Therapeutic Activity: ( minutes): Activity Date:   Date:   Date:     Exercise Parameters Parameters Parameters                                              Neuromuscular Education: ( Minutes):                  Manual Intervention Date:   Date:   Date:     Soft tissue mobilization Joint Mobility Parameters Parameters Parameters                                                      Modalities: ( minutes):       Treatment/Session Assessment:      · Post-Treatment Pain/ Symptoms: 1  2/10 with squatting. Active left knee ext/flex -5/110. Treatment modified due to dermatologic surgery, plan to resume prior program end of the week. ·   Compliance with Program/Exercises: Performing ex daily as instructed.   · Recommendations/Intent for next treatment session: Progress with range of motion and functional strengthening program..    Future Appointments  Date Time Provider [place]   9/19/2018 9:30 AM Vladislav Soria, RT [facility]   9/24/2018 9:30 AM Magalis Soria, RT [facility]   9/26/2018 9:30 AM Vladislav Soria, RT [facility]   2/25/2019 9:30 AM Ronnie Tamez MD [facility]       Total Treatment Duration:  45 min Therapeutic EX  PT Patient Time In/Time Out  Time In: 0929  Time Out: 97 Juhi Salter PT

## 2018-09-19 ENCOUNTER — HOSPITAL ENCOUNTER (OUTPATIENT)
Dept: PHYSICAL THERAPY | Age: 68
Discharge: HOME | End: 2018-09-19
Payer: MEDICARE

## 2018-09-19 PROCEDURE — 97110 THERAPEUTIC EXERCISES: CPT

## 2018-09-19 NOTE — PROGRESS NOTES
Eladio Prasad  : 1950  [place]  Secondary: [place] [address] [address] at . Kari Bernabe 39  [address]. [place] [address], [address]  [phone]   [phone]         OUTPATIENT PHYSICAL THERAPY:Daily Note 2018    ICD-10: Treatment Diagnosis:  Pain in left knee (M25.562)  Osteoarthritis of knee, unspecified (M17.9)  Stiffness of left knee, not elsewhere classified (M25.662)  Effusion, left knee (M25.462)  Back  Pain (M54.5)  Precautions/Allergies:   Lisinopril; Losartan; and Statins-hmg-coa reductase inhibitors   Fall Risk Score: 1 (? 5 = High Risk)  MD Orders: Eval and Treat MEDICAL/REFERRING DIAGNOSIS:  Unilateral primary osteoarthritis, left knee [M17.12]  Spinal stenosis, lumbar region with neurogenic claudication [M48.062]  Other intervertebral disc degeneration, lumbar region [M51.36]  DATE OF ONSET: three year history  REFERRING PHYSICIAN: Jesica Bautista., *  RETURN PHYSICIAN APPOINTMENT: to be determined     INITIAL ASSESSMENT:  Mr. Carlos Garrett presents to physical therapy with decreased strength, ROM, joint mobility, functional mobility . These S/S are consistent with left knee osteoarthritis. Patient will benefit from skilled physical therapy for manual therapeutic techniques (as appropriate), therapeutic exercises and activities, balance and comprehensive home exercises program to address current impairments and functional limitations. Eladio Prasad will benefit from skilled PT (medically necessary) in order to address above deficits affecting participation in basic ADLs and overall functional tolerance. PROBLEM LIST (Impacting functional limitations):   1. Decreased Strength  2. Decreased ADL/Functional Activities  3. Decreased Transfer Abilities  4. Decreased Ambulation Ability/Technique  5. Decreased Balance  6. Increased Pain  7. Decreased Activity Tolerance  8.  Decreased [place] with Home Exercise Program INTERVENTIONS PLANNED:  1. Balance Exercise  2. Family Education  3. Gait Training  4. Home Exercise Program (HEP)  5. Manual Therapy  6. Neuromuscular Re-education/Strengthening  7. Range of Motion (ROM)  8. Therapeutic Activites  9. Therapeutic Exercise/Strengthening  10. Transfer Training  11. TREATMENT PLAN:  Effective Dates: 8/23/2018 TO 9/22/2018 (30 days). Frequency/Duration: 2 times a week for 30 Days    GOALS: (Goals have been discussed and agreed upon with patient.)  Short-Term Functional Goals: Time Frame: 2 weeks  1. Estela Palumbo will be independent with HEP for strength and ROM  2. Estelaradha Palumbo will tolerate manual therapy/joint mobilizations to increase knee flexion ROM so pt can ambulate stairs and walk with a more normalized gait pattern. [address] [place] [address] will participate in LE strengthening exercises for hip, knee, ankle with weight as appropriate for 3 sets of 10.  [address] [place] [address] will tolerate scar massage as appropriate to improve tissue mobility with patient to perform independently after education  5. Estelaradha Palumbo will participate in static and dynamic balance activities for 5 minutes to help improve proprioception and decrease risk of falls  6. [place] Reddish to increase lower extremity functional scale by 10 points to show improvement in areas of difficulty  7. [place] Redjustenh will demonstrate left knee flexion >=105 degrees to improve functional mobility and tolerance of ADLs. [address] [place] [address] will demonstrate L knee extension >= 5 degrees to improve functional mobility and tolerance of ADLs  9. [place] Reddish will improve MMTL LE to >=4+/5 to improve current level of independence and community reintegration. Long-Term Goals: Time Frame:6   1. Estela Palumbo will be independent in HEP of stretching and strengthening   2.  [place]akdaleradha Palumbo will ascend/descend 12 steps with reciprocal gait pattern and rail   [address] [place] [address] 21 to increase lower extremity functional scale by15 points to show improvement in areas of difficulty     Rehabilitation Potential For Stated Goals: Good  Regarding Shelby Pratt's therapy, I certify that the treatment plan above will be carried out by a therapist or under their direction. Thank you for this referral,  Marlee Adair, RT     Referring Physician Signature: Marily Griffiths., *              Date                  HISTORY:   History of Present Injury/Illness (Reason for Referral):  Patient presents with three year history of worsening left knee pain, stiffness, swelling and lack of function. Is planning to have a total knee arthroplasty in the next four months. He is here for pre-op exercise to improve range of motion, strength and overall function. Mr Kye Santos also is complaining of left hip pain during walking.  -Present Symptoms (on day of evaluation):  Knee stiffness, swelling, and decreased walking ability. Pain;   · Currently: 5/10  · Best: 4/10  · Worst: 9/10  · Aggravating factors: walking, standing, squatting, climbing stairs. · Relieving factors: rest and medication. Past Medical History/Comorbidities:   Mr. Kye Santos  has a past medical history of Arthritis; CAD (coronary artery disease); Chest pain, unspecified (8/31/2012); Hyperlipidemia; Hypertension (8/31/2012); Mitral valve regurgitation; NSTEMI (non-ST elevated myocardial infarction) ([address] (9/2/2012); Post PTCA; Varicose veins of both lower extremities (7/6/2016); and Varicose veins of legs. He also has no past medical history of Chronic kidney disease or Stroke ([address]   Mr. Kye Santos  has a past surgical history that includes pr cardiac surg procedure unlist (2012); hx knee arthroscopy; hx other surgical; and hx other surgical.    Active Ambulatory Problems     Diagnosis Date Noted    Hypertension 08/31/2012    CAD (coronary artery disease) 09/02/2012    Hyperlipidemia     Mitral valve regurgitation     Varicose veins of both lower extremities 07/06/2016     Resolved Ambulatory Problems     Diagnosis Date Noted    NSTEMI (non-ST elevated [facility] infarction) [facility] 09/02/2012    Post PTCA      Past Medical History:   Diagnosis Date    Arthritis     CAD (coronary artery disease)     Chest pain, unspecified 8/31/2012    Hyperlipidemia     Hypertension 8/31/2012    Mitral valve regurgitation     NSTEMI (non-ST elevated myocardial infarction) ([facility] 9/2/2012    Post PTCA     Varicose veins of both lower extremities 7/6/2016    Varicose veins of legs      Social History/Living Environment:        Social History     Social History    Marital status:      Spouse name: N/A    Number of children: N/A    Years of education: N/A     Occupational History    Not on file. Social History Main Topics    Smoking status: Former Smoker     Quit date: 10/5/1981    Smokeless tobacco: Never Used    Alcohol use Yes      Comment: occ    Drug use: No    Sexual activity: Not on file     Other Topics Concern    Not on file     Social History Narrative     Prior Level of Function/Work/Activity:  Retired . Previous Treatment Approach  Antiinflammatories, joint injections    Current Medications:    Current Outpatient Prescriptions:     b complex vitamins tablet, Take 1 Tab by mouth daily. , Disp: , Rfl:     Cetirizine (ZYRTEC) 10 mg cap, Take  by mouth., Disp: , Rfl:     amLODIPine (NORVASC) 5 mg tablet, Take 1 Tab by mouth as needed. , Disp: 90 Tab, Rfl: 3    nitroglycerin (NITROSTAT) 0.4 mg SL tablet, 1 Tab by SubLINGual route every five (5) minutes as needed for Chest Pain., Disp: 25 Tab, Rfl: 5    magnesium 250 mg tab, Take  by mouth., Disp: , Rfl:     AMINO ACIDS/MV,IRON,MIN (OCUVITE EXTRA PO), Take  by mouth., Disp: , Rfl:     coenzyme Q-10 (CO Q-10) 200 mg capsule, Take  by mouth daily. 2qd, Disp: , Rfl:     aspirin 81 mg chewable tablet, Take 1 Tab by mouth daily. , Disp: , Rfl:      Date Last Reviewed:  9/19/2018   Number of Personal Factors/Comorbidities that affect the Plan of Care: 1-2: MODERATE COMPLEXITY   EXAMINATION:   Observation/Orthostatic Postural Assessment:   Gait:  Antalgic, stiff knee gait, uses straight cane. AROM/PROM         Joint: Eval Date:   8/23/18  Re-Assess Date:  8/30/18  Re-Assess Date:    Active ROM RIGHT LEFT RIGHT LEFT RIGHT LEFT   Knee Extension 0 -20       Knee Flexion 133 90       Hip Flexion         Ankle mobility         Lumbar flexion   50 no pain      extension   5 mild pain central                        Passive ROM         Knee Extension 0 -10       Knee Flexion 135 100         Strength:     Eval Date:  8/23/18  Re-Assess Date:  Re-Assess Date:      RIGHT LEFT RIGHT LEFT RIGHT LEFT   Knee Flexion 5/5 3+/5       Knee Extension 5/5 3+/5       Hip Flexion 4/5 4/5       Hip Abduction 4/5 3+/5       Hip Extension 3+/5 3+/5       Ankle Dorsiflexion                        Joint Mobility Eval Date:  Re-Assess Date:  Re-Assess Date:     RIGHT LEFT RIGHT LEFT RIGHT LEFT   Tibiofemoral  hypomobility       Patellofemoral  hypomobility       Tibiofibular         Talocrual               Neurological Screen:  No radiating symptoms down leg  Functional Mobility: Limited tolerance of walking and standing. TUG test: 13.30 sec  Sit to Stand= keeps left leg extended but able to stand without use of hands. Squat= limtied by 50% decreased weight bearing left LE, decreased knee flexion. Balance:    Single leg stance: R= 30 sec / L=2 sec   Body Structures Involved:  1. Joints  2. Muscles  3. Ligaments Body Functions Affected:  1. Sensory/Pain  2. Neuromusculoskeletal  3. Movement Related Activities and Participation Affected:  1. Mobility  2. Self Care   Number of elements that affect the Plan of Care: 4+: HIGH COMPLEXITY   CLINICAL PRESENTATION:   Presentation: Evolving clinical presentation with changing clinical characteristics: MODERATE COMPLEXITY   CLINICAL DECISION MAKING:   Outcome Measure:    Tool Used: Lower Extremity Functional Scale (LEFS)  Score:  Initial: 31/80 Most Recent: X/80 (Date: -- )   Interpretation of Score: 20 questions each scored on a 5 point scale with 0 representing \"extreme difficulty or unable to perform\" and 4 representing \"no difficulty\". The lower the score, the greater the functional disability. 80/80 represents no disability. Minimal detectable change is 9 points. Score 80 79-63 62-48 47-32 31-16 15-1 0   Modifier CH CI CJ CK CL CM CN     ? Mobility - Walking and Moving Around:     - CURRENT STATUS: CL - 60%-79% impaired, limited or restricted    - GOAL STATUS: CK - 40%-59% impaired, limited or restricted    - D/C STATUS:  ---------------To be determined---------------    Medical Necessity:   · Skilled intervention continues to be required due to current impairment. Reason for Services/Other Comments:  · Patient continues to require skilled intervention due to medical complications and patient unable to attend/participate in therapy as expected. TREATMENT:   (In addition to Assessment/Re-Assessment sessions the following treatments were rendered)    · Pre-Treatment Pain/ Symptoms: Knee is sore today, calf is tight. THERAPEUTIC EXERCISE: (45 minutes):  Exercises per grid below to improve mobility, strength and balance. Required minimal visual and verbal cues to promote proper body alignment, promote proper body posture and promote proper body mechanics. Progressed resistance, range and repetitions as indicated.        Date:  8/23/18 Date:  8/28/18 Date:  8/30/18 Date  9/5/18 Date  9/7/18 Date  9/10/18 Date  9/12/18 Date  9/17/18 Date  9/19/18   Activity/Exercise Parameters Parameters Parameters Parameters parameters parameters parameters parameters    Patient education Treatment expectations, pre/post op expectations, HEP Progression of HEP HEP progression  HEP progressin       Heel slide 10 x 10 x          Quad sets 10 x 20 x  20 x        SAQ 2 x 10 2 x 10 with laser feedback 3 x 15 with laser 3 x 15 with laser        SLR 2 x 10  20 x 20 x 30 x With laser   30 x     Knee extension stretch 2 min 3 min   3 min       Nu step 8 min 8 min 8 min 10 min 10 min 10 min level 4.6 10 min 4.6 level 10 min 10 min level 5   bridges  20 x 20 x 30 x With band above kneesknee       clams  20 x 20 x         Hip abd standing   Red band 20x ea 25 x ea Lateral walk 3 min orange band above knees 3 min 3 min 5 min    Therapy ball knees to chest    3 min 30 x with hip knee ext to flex 30 x       Ball rotation with knees   3 min 30 x        Hip hinge stretch   6 x  6 x 10 x 10 x 10 x    Standing TKES with band    30 x blue band Heavy red band Heavy black band 3 min 4 min heavy black 2 x 20 heavy black bans 50 x with laser feed back, heavy black band   Standing knee flex lunge position    Pain free ROM with laser guidance 20 x        Hip hinge with pole     20 x 20 x 20 x     LAQ     30 x with laser for terminal extension  5 lbs 10 sec  5lbs with laser feedback 3 x 20 30x2 5lbs   Calf raise      On stair 10 x 12 x  20 x 20x   Dead lift       10 lbs 2 x 10 Black heavy band  2 x 10   2 x10   Shuttle squat      30 x 3 cords 30 x 4 cords 30 x 4 cords    Therapy ball sitting flexion stretch      5 min      Tilt board        4 min     Lateral walk        [place] band 100 ft 100 ft   Monster walk        Orange band 50 ft 50     [facility]      Therapeutic Activity: ( minutes): Activity Date:   Date:   Date:     Exercise Parameters Parameters Parameters                                              Neuromuscular Education: ( Minutes):                  Manual Intervention Date:   Date:   Date:     Soft tissue mobilization Joint Mobility Parameters Parameters Parameters                                                      Modalities: ( minutes):       Treatment/Session Assessment:      · Post-Treatment Pain/ Symptoms: 1  2/10 with squatting.  Decreased pain and stiffness post ex          Active left knee ext/flex -5/110. Treatment modified due to dermatologic surgery, plan to resume prior program end of the week. ·   Compliance with Program/Exercises: Performing ex daily as instructed.   · Recommendations/Intent for next treatment session: Progress with range of motion and functional strengthening program..    Future Appointments  Date Time Provider [place]   9/24/2018 9:30 AM Joaquin Soria, RT [facility]   9/26/2018 9:30 AM [facility]   2/25/2019 9:30 AM Thanh Colby MD [facility]       Total Treatment Duration:  45 min Therapeutic EX  PT Patient Time In/Time Out  Time In: [address] PT

## 2018-09-24 ENCOUNTER — HOSPITAL ENCOUNTER (OUTPATIENT)
Dept: PHYSICAL THERAPY | Age: 68
Discharge: HOME | End: 2018-09-24
Payer: MEDICARE

## 2018-09-24 PROCEDURE — 97110 THERAPEUTIC EXERCISES: CPT

## 2018-09-24 NOTE — PROGRESS NOTES
Perry Dial  : 1950  [place]  Secondary: [place] [address] [facility] at . Kari Bernabe [address]  [address]. [place] [address], [address]  [phone]   [phone]         OUTPATIENT PHYSICAL THERAPY:Daily Note 2018    ICD-10: Treatment Diagnosis:  Pain in left knee (M25.562)  Osteoarthritis of knee, unspecified (M17.9)  Stiffness of left knee, not elsewhere classified (M25.662)  Effusion, left knee (M25.462)  Back  Pain (M54.5)  Precautions/Allergies:   Lisinopril; Losartan; and Statins-hmg-coa reductase inhibitors   Fall Risk Score: 1 (? 5 = High Risk)  MD Orders: Eval and Treat MEDICAL/REFERRING DIAGNOSIS:  Unilateral primary osteoarthritis, left knee [M17.12]  Spinal stenosis, lumbar region with neurogenic claudication [M48.062]  Other intervertebral disc degeneration, lumbar region [M51.36]  DATE OF ONSET: three year history  REFERRING PHYSICIAN: Radha Newton., *  RETURN PHYSICIAN APPOINTMENT: to be determined     INITIAL ASSESSMENT:  Mr. Linnette Graham presents to physical therapy with decreased strength, ROM, joint mobility, functional mobility . These S/S are consistent with left knee osteoarthritis. Patient will benefit from skilled physical therapy for manual therapeutic techniques (as appropriate), therapeutic exercises and activities, balance and comprehensive home exercises program to address current impairments and functional limitations. Perry Dial will benefit from skilled PT (medically necessary) in order to address above deficits affecting participation in basic ADLs and overall functional tolerance. PROBLEM LIST (Impacting functional limitations):   1. Decreased Strength  2. Decreased ADL/Functional Activities  3. Decreased Transfer Abilities  4. Decreased Ambulation Ability/Technique  5. Decreased Balance  6. Increased Pain  7. Decreased Activity Tolerance  8.  Decreased [place] with Home Exercise Program INTERVENTIONS PLANNED:  1. Balance Exercise  2. Family Education  3. Gait Training  4. Home Exercise Program (HEP)  5. Manual Therapy  6. Neuromuscular Re-education/Strengthening  7. Range of Motion (ROM)  8. Therapeutic Activites  9. Therapeutic Exercise/Strengthening  10. Transfer Training  11. TREATMENT PLAN:  Effective Dates: 8/23/2018 TO 9/22/2018 (30 days). Frequency/Duration: 2 times a week for 30 Days    GOALS: (Goals have been discussed and agreed upon with patient.)  Short-Term Functional Goals: Time Frame: 2 weeks  1. Fco Chiu will be independent with HEP for strength and ROM  2. Fco Chiu will tolerate manual therapy/joint mobilizations to increase knee flexion ROM so pt can ambulate stairs and walk with a more normalized gait pattern. [address] [place] [address] will participate in LE strengthening exercises for hip, knee, ankle with weight as appropriate for 3 sets of 10.  [address] [place] [address] will tolerate scar massage as appropriate to improve tissue mobility with patient to perform independently after education  5. Fco Chiu will participate in static and dynamic balance activities for 5 minutes to help improve proprioception and decrease risk of falls  6. Fco Chiu to increase lower extremity functional scale by 10 points to show improvement in areas of difficulty  7. Fco Chiu will demonstrate left knee flexion >=105 degrees to improve functional mobility and tolerance of ADLs. [address] [place] [address] will demonstrate L knee extension >= 5 degrees to improve functional mobility and tolerance of ADLs  9. Fco Chiu will improve MMTL LE to >=4+/5 to improve current level of independence and community reintegration. Long-Term Goals: Time Frame:6   1. Fco Chiu will be independent in HEP of stretching and strengthening   2.  Rice Ring will ascend/descend 12 steps with reciprocal gait pattern and rail   [address] [place] [address] to increase lower extremity functional scale by15 points to show improvement in areas of difficulty     Rehabilitation Potential For Stated Goals: Good  Regarding Son Pratt's therapy, I certify that the treatment plan above will be carried out by a therapist or under their direction. Thank you for this referral,  Asim Silva, RT     Referring Physician Signature: Lord Mccrary., *              Date                  HISTORY:   History of Present Injury/Illness (Reason for Referral):  Patient presents with three year history of worsening left knee pain, stiffness, swelling and lack of function. Is planning to have a total knee arthroplasty in the next four months. He is here for pre-op exercise to improve range of motion, strength and overall function. Mr Negro Sung also is complaining of left hip pain during walking.  -Present Symptoms (on day of evaluation):  Knee stiffness, swelling, and decreased walking ability. Pain;   · Currently: 5/10  · Best: 4/10  · Worst: 9/10  · Aggravating factors: walking, standing, squatting, climbing stairs. · Relieving factors: rest and medication. Past Medical History/Comorbidities:   Mr. Negro Sung  has a past medical history of Arthritis; CAD (coronary artery disease); Chest pain, unspecified (8/31/2012); Hyperlipidemia; Hypertension (8/31/2012); Mitral valve regurgitation; NSTEMI (non-ST elevated myocardial infarction) ([address] (9/2/2012); Post PTCA; Varicose veins of both lower extremities (7/6/2016); and Varicose veins of legs. He also has no past medical history of Chronic kidney disease or Stroke ([address]   Mr. Negro Sung  has a past surgical history that includes pr cardiac surg procedure unlist (2012); hx knee arthroscopy; hx other surgical; and hx other surgical.    Active Ambulatory Problems     Diagnosis Date Noted    Hypertension 08/31/2012    CAD (coronary artery disease) 09/02/2012    Hyperlipidemia     Mitral valve regurgitation     Varicose veins of both lower extremities 07/06/2016     Resolved Ambulatory Problems     Diagnosis Date Noted    NSTEMI (non-ST elevated myocardial infarction) [facility] 09/02/2012    Post PTCA      Past Medical History:   Diagnosis Date    Arthritis     CAD (coronary artery disease)     Chest pain, unspecified 8/31/2012    Hyperlipidemia     Hypertension 8/31/2012    Mitral valve regurgitation     NSTEMI (non-ST elevated myocardial infarction) ([facility] 9/2/2012    Post PTCA     Varicose veins of both lower extremities 7/6/2016    Varicose veins of legs      Social History/Living Environment:        Social History     Social History    Marital status:      Spouse name: N/A    Number of children: N/A    Years of education: N/A     Occupational History    Not on file. Social History Main Topics    Smoking status: Former Smoker     Quit date: 10/5/1981    Smokeless tobacco: Never Used    Alcohol use Yes      Comment: occ    Drug use: No    Sexual activity: Not on file     Other Topics Concern    Not on file     Social History Narrative     Prior Level of Function/Work/Activity:  Retired . Previous Treatment Approach  Antiinflammatories, joint injections    Current Medications:    Current Outpatient Prescriptions:     b complex vitamins tablet, Take 1 Tab by mouth daily. , Disp: , Rfl:     Cetirizine (ZYRTEC) 10 mg cap, Take  by mouth., Disp: , Rfl:     amLODIPine (NORVASC) 5 mg tablet, Take 1 Tab by mouth as needed. , Disp: 90 Tab, Rfl: 3    nitroglycerin (NITROSTAT) 0.4 mg SL tablet, 1 Tab by SubLINGual route every five (5) minutes as needed for Chest Pain., Disp: 25 Tab, Rfl: 5    magnesium 250 mg tab, Take  by mouth., Disp: , Rfl:     AMINO ACIDS/MV,IRON,MIN (OCUVITE EXTRA PO), Take  by mouth., Disp: , Rfl:     coenzyme Q-10 (CO Q-10) 200 mg capsule, Take  by mouth daily. 2qd, Disp: , Rfl:     aspirin 81 mg chewable tablet, Take 1 Tab by mouth daily. , Disp: , Rfl:      Date Last Reviewed:  9/24/2018   Number of Personal Factors/Comorbidities that affect the Plan of Care: 1-2: MODERATE COMPLEXITY   EXAMINATION:   Observation/Orthostatic Postural Assessment:   Gait:  Antalgic, stiff knee gait, uses straight cane. AROM/PROM         Joint: Eval Date:   8/23/18  Re-Assess Date:  8/30/18  Re-Assess Date:    Active ROM RIGHT LEFT RIGHT LEFT RIGHT LEFT   Knee Extension 0 -20       Knee Flexion 133 90       Hip Flexion         Ankle mobility         Lumbar flexion   50 no pain      extension   5 mild pain central                        Passive ROM         Knee Extension 0 -10       Knee Flexion 135 100         Strength:     Eval Date:  8/23/18  Re-Assess Date:  Re-Assess Date:      RIGHT LEFT RIGHT LEFT RIGHT LEFT   Knee Flexion 5/5 3+/5       Knee Extension 5/5 3+/5       Hip Flexion 4/5 4/5       Hip Abduction 4/5 3+/5       Hip Extension 3+/5 3+/5       Ankle Dorsiflexion                        Joint Mobility Eval Date:  Re-Assess Date:  Re-Assess Date:     RIGHT LEFT RIGHT LEFT RIGHT LEFT   Tibiofemoral  hypomobility       Patellofemoral  hypomobility       Tibiofibular         Talocrual               Neurological Screen:  No radiating symptoms down leg  Functional Mobility: Limited tolerance of walking and standing. TUG test: 13.30 sec  Sit to Stand= keeps left leg extended but able to stand without use of hands. Squat= limtied by 50% decreased weight bearing left LE, decreased knee flexion. Balance:    Single leg stance: R= 30 sec / L=2 sec   Body Structures Involved:  1. Joints  2. Muscles  3. Ligaments Body Functions Affected:  1. Sensory/Pain  2. Neuromusculoskeletal  3. Movement Related Activities and Participation Affected:  1. Mobility  2. Self Care   Number of elements that affect the Plan of Care: 4+: HIGH COMPLEXITY   CLINICAL PRESENTATION:   Presentation: Evolving clinical presentation with changing clinical characteristics: MODERATE COMPLEXITY   CLINICAL DECISION MAKING:   Outcome Measure:    Tool Used: Lower Extremity Functional Scale (LEFS)  Score:  Initial: 31/80 Most Recent: X/80 (Date: -- )   Interpretation of Score: 20 questions each scored on a 5 point scale with 0 representing \"extreme difficulty or unable to perform\" and 4 representing \"no difficulty\". The lower the score, the greater the functional disability. 80/80 represents no disability. Minimal detectable change is 9 points. Score 80 79-63 62-48 47-32 31-16 15-1 0   Modifier CH CI CJ CK CL CM CN     ? Mobility - Walking and Moving Around:     - CURRENT STATUS: CL - 60%-79% impaired, limited or restricted    - GOAL STATUS: CK - 40%-59% impaired, limited or restricted    - D/C STATUS:  ---------------To be determined---------------    Medical Necessity:   · Skilled intervention continues to be required due to current impairment. Reason for Services/Other Comments:  · Patient continues to require skilled intervention due to medical complications and patient unable to attend/participate in therapy as expected. TREATMENT:   (In addition to Assessment/Re-Assessment sessions the following treatments were rendered)    · Pre-Treatment Pain/ Symptoms: My back is feeling much better, I know what to do to make it feel better. Can go down stairs one after the other if he goes slow. THERAPEUTIC EXERCISE: (45 minutes):  Exercises per grid below to improve mobility, strength and balance. Required minimal visual and verbal cues to promote proper body alignment, promote proper body posture and promote proper body mechanics. Progressed resistance, range and repetitions as indicated.        Date:  8/23/18 Date:  8/28/18 Date:  8/30/18 Date  9/5/18 Date  9/7/18 Date  9/10/18 Date  9/12/18 Date  9/17/18 Date  9/19/18 Date 9/24/18   Activity/Exercise Parameters Parameters Parameters Parameters parameters parameters parameters parameters  parameters   Patient education Treatment expectations, pre/post op expectations, HEP Progression of HEP HEP progression  HEP progressin        Heel slide 10 x 10 x           Quad sets 10 x 20 x  20 x         SAQ 2 x 10 2 x 10 with laser feedback 3 x 15 with laser 3 x 15 with laser         SLR 2 x 10  20 x 20 x 30 x With laser   30 x      Knee extension stretch 2 min 3 min   3 min        Nu step 8 min 8 min 8 min 10 min 10 min 10 min level 4.6 10 min 4.6 level 10 min 10 min level 5 10 min level 5   bridges  20 x 20 x 30 x With band above kneesknee        clams  20 x 20 x          Hip abd standing   Red band 20x ea 25 x ea Lateral walk 3 min orange band above knees 3 min 3 min 5 min   5 min   Therapy ball knees to chest    3 min 30 x with hip knee ext to flex 30 x        Ball rotation with knees   3 min 30 x         Hip hinge stretch   6 x  6 x 10 x 10 x 10 x     Standing TKES with band    30 x blue band Heavy red band Heavy black band 3 min 4 min heavy black 2 x 20 heavy black bans 50 x with laser feed back, heavy black band 50 x laser feedback   Standing knee flex lunge position    Pain free ROM with laser guidance 20 x         Hip hinge with pole     20 x 20 x 20 x      LAQ     30 x with laser for terminal extension  5 lbs 10 sec  5lbs with laser feedback 3 x 20 30x2 5lbs 7 lbs 30 x   Calf raise      On stair 10 x 12 x  20 x 20x 20 x   Dead lift       10 lbs 2 x 10 Black heavy band  2 x 10   2 x10 2 x 10 black band    Shuttle squat      30 x 3 cords 30 x 4 cords 30 x 4 cords     Therapy ball sitting flexion stretch      5 min       Tilt board        4 min      Lateral walk        [place] band 100 ft 100 ft 120 ft orange   Monster walk        Orange band 50 ft 50 50 ft orange     [employer] Portal      Therapeutic Activity: ( minutes):      Activity Date:   Date:   Date:     Exercise Parameters Parameters Parameters                                              Neuromuscular Education: ( Minutes):                  Manual Intervention Date:   Date:   Date:     Soft tissue mobilization Joint Mobility Parameters Parameters Parameters                                                      Modalities: ( minutes):       Treatment/Session Assessment:      · Post-Treatment Pain/ Symptoms: No change increase in sx. Active left knee ext/flex -5/110. Treatment modified due to dermatologic surgery. Plan to review and update HEP next visit ·   Compliance with Program/Exercises: Performing ex daily as instructed. · Recommendations/Intent for next treatment session: Progress to HEP/DC next visit.     Future Appointments  Date Time Provider Tenn[place] Hien   9/26/2018 9:30 AM RT VIRGIE [facility]   2/25/2019 9:30 AM Micheal Manuel MD [facility]       Total Treatment Duration:  45 min Therapeutic EX  PT Patient Time In/Time Out  Time In: 0925  Time Out: [address] PT

## 2018-09-26 ENCOUNTER — HOSPITAL ENCOUNTER (OUTPATIENT)
Dept: PHYSICAL THERAPY | Age: 68
Discharge: HOME | End: 2018-09-26
Payer: MEDICARE

## 2018-09-26 PROCEDURE — G8979 MOBILITY GOAL STATUS: HCPCS

## 2018-09-26 PROCEDURE — G8980 MOBILITY D/C STATUS: HCPCS

## 2018-09-26 PROCEDURE — 97110 THERAPEUTIC EXERCISES: CPT

## 2018-09-26 NOTE — THERAPY DISCHARGE
Elida Eid  : 1950  [place]  Secondary: [place] [address] [address] at . Kari Bernabe [address]  [address]. [address], [address]  [phone]   [phone]         OUTPATIENT PHYSICAL THERAPY:Daily Note and Discharge 2018    ICD-10: Treatment Diagnosis:  Pain in left knee (M25.562)  Osteoarthritis of knee, unspecified (M17.9)  Stiffness of left knee, not elsewhere classified (M25.662)  Effusion, left knee (M25.462)  Back  Pain (M54.5)  Precautions/Allergies:   Lisinopril; Losartan; and Statins-hmg-coa reductase inhibitors   Fall Risk Score: 1 (? 5 = High Risk)  MD Orders: Eval and Treat MEDICAL/REFERRING DIAGNOSIS:  Unilateral primary osteoarthritis, left knee [M17.12]  Spinal stenosis, lumbar region with neurogenic claudication [M48.062]  Other intervertebral disc degeneration, lumbar region [M51.36]  DATE OF ONSET: three year history  REFERRING PHYSICIAN: Stanley Rai., *  RETURN PHYSICIAN APPOINTMENT: to be determined     CURRENT ASSESSMENT: MR Alfonso Pittman has independent home program that he will continue to perform up to the time of his total knee replacement. Has been DC'd having met majority of his goals. TREATMENT PLAN:  Effective Dates: 2018 TO 2018 (30 days). Frequency/Duration: 2 times a week for 30 Days    GOALS: (Goals have been discussed and agreed upon with patient.)  Short-Term Functional Goals: Time Frame: 2 weeks  1. Elida Eid will be independent with HEP for strength and ROM GOAL MET 2018  2.   3. Elida Eid will tolerate manual therapy/joint mobilizations to increase knee flexion ROM so pt can ambulate stairs and walk with a more normalized gait pattern. GOAL MET 2018  4.   5. Elida Eid will participate in LE strengthening exercises for hip, knee, ankle with weight as appropriate for 3 sets of 10. GOAL MET 2018  6.  Elida Eid will participate in static and dynamic balance activities for 5 minutes to help improve proprioception and decrease risk of falls GOAL MET 9/26/2018  7.   8. Sung Rendon to increase lower extremity functional scale by 10 points to show improvement in areas of difficulty  9. Sung Rendon will demonstrate left knee flexion >=105 degrees to improve functional mobility and tolerance of ADLs. GOAL MET 9/26/2018  10.   [address] will demonstrate L knee extension >= 5 degrees to improve functional mobility and tolerance of ADLs GOAL MET 9/26/2018  12.   [address] will improve MMTL LE to >=4+/5 to improve current level of independence and community reintegration. GOAL MET 9/26/2018  14. Long-Term Goals: Time Frame:6   1. Sung Rendon will be independent in HEP of stretching and strengthening GOAL MET 9/26/2018     2. Sung Rendon will ascend/descend 12 steps with reciprocal gait pattern and rail GOAL MET 9/26/2018     3 Sung Rendon to increase lower extremity functional scale by15 points to show improvement in areas of difficulty GOAL MET 9/26/2018              HISTORY:   History of Present Injury/Illness (Reason for Referral):  Patient presents with three year history of worsening left knee pain, stiffness, swelling and lack of function. Is planning to have a total knee arthroplasty in the next four months. He is here for pre-op exercise to improve range of motion, strength and overall function. Mr Ifrah Bustamante also is complaining of left hip pain during walking.  -Present Symptoms (on day of evaluation):  Knee stiffness, swelling, and decreased walking ability. Pain;   · Currently: 3/10  · Best: 3/10  · Worst: 8/10  · Aggravating factors: walking a mile  · Relieving factors: rest and medication. Past Medical History/Comorbidities:   Mr. Ifrah Bustamante  has a past medical history of Arthritis; CAD (coronary artery disease); Chest pain, unspecified (8/31/2012); Hyperlipidemia; Hypertension (8/31/2012);  Mitral valve regurgitation; NSTEMI (non-ST elevated myocardial infarction) ([facility] [address] (9/2/2012); Post PTCA; Varicose veins of both lower extremities (7/6/2016); and Varicose veins of legs. He also has no past medical history of Chronic kidney disease or Stroke ([facility] [address] Mr. Andi Grider  has a past surgical history that includes pr cardiac surg procedure unlist (2012); hx knee arthroscopy; hx other surgical; and hx other surgical.    Active Ambulatory Problems     Diagnosis Date Noted    Hypertension 08/31/2012    CAD (coronary artery disease) 09/02/2012    Hyperlipidemia     Mitral valve regurgitation     Varicose veins of both lower extremities 07/06/2016     Resolved Ambulatory Problems     Diagnosis Date Noted    NSTEMI (non-ST elevated myocardial infarction) ([facility] [address] 09/02/2012    Post PTCA      Past Medical History:   Diagnosis Date    Arthritis     CAD (coronary artery disease)     Chest pain, unspecified 8/31/2012    Hyperlipidemia     Hypertension 8/31/2012    Mitral valve regurgitation     NSTEMI (non-ST elevated myocardial infarction) ([facility] [address] 9/2/2012    Post PTCA     Varicose veins of both lower extremities 7/6/2016    Varicose veins of legs      Social History/Living Environment:        Social History     Social History    Marital status:      Spouse name: N/A    Number of children: N/A    Years of education: N/A     Occupational History    Not on file. Social History Main Topics    Smoking status: Former Smoker     Quit date: 10/5/1981    Smokeless tobacco: Never Used    Alcohol use Yes      Comment: occ    Drug use: No    Sexual activity: Not on file     Other Topics Concern    Not on file     Social History Narrative     Prior Level of Function/Work/Activity:  Retired . Previous Treatment Approach  Antiinflammatories, joint injections    Current Medications:    Current Outpatient Prescriptions:     b complex vitamins tablet, Take 1 Tab by mouth daily. , Disp: , Rfl:    Cetirizine (ZYRTEC) 10 mg cap, Take  by mouth., Disp: , Rfl:     amLODIPine (NORVASC) 5 mg tablet, Take 1 Tab by mouth as needed. , Disp: 90 Tab, Rfl: 3    nitroglycerin (NITROSTAT) 0.4 mg SL tablet, 1 Tab by SubLINGual route every five (5) minutes as needed for Chest Pain., Disp: 25 Tab, Rfl: 5    magnesium 250 mg tab, Take  by mouth., Disp: , Rfl:     AMINO ACIDS/MV,IRON,MIN (OCUVITE EXTRA PO), Take  by mouth., Disp: , Rfl:     coenzyme Q-10 (CO Q-10) 200 mg capsule, Take  by mouth daily. 2qd, Disp: , Rfl:     aspirin 81 mg chewable tablet, Take 1 Tab by mouth daily. , Disp: , Rfl:      Date Last Reviewed:  9/26/2018   EXAMINATION:   Observation/Orthostatic Postural Assessment:   Gait:  Antalgic, stiff knee gait, uses straight cane. AROM/PROM         Joint: Eval Date:   8/23/18  Re-Assess Date:  8/30/18  Re-Assess Date:   9/26/18    Active ROM RIGHT LEFT RIGHT LEFT RIGHT LEFT   Knee Extension 0 -20   -5    Knee Flexion 133 90   110    Hip Flexion         Ankle mobility         Lumbar flexion   50 no pain  50 no pain    extension   5 mild pain central  15 no pain                      Passive ROM         Knee Extension 0 -10   -5    Knee Flexion 135 100   115      Strength:     Eval Date:  8/23/18  Re-Assess Date:  Re-Assess Date:  9/26/18      RIGHT LEFT RIGHT LEFT RIGHT LEFT   Knee Flexion 5/5 3+/5   5 4+   Knee Extension 5/5 3+/5   5 4+   Hip Flexion 4/5 4/5   5- 4+   Hip Abduction 4/5 3+/5   4+ 4   Hip Extension 3+/5 3+/5   4 4   Ankle Dorsiflexion                        Joint Mobility Eval Date:  Re-Assess Date:  Re-Assess Date:     RIGHT LEFT RIGHT LEFT RIGHT LEFT   Tibiofemoral  hypomobility       Patellofemoral  hypomobility       Tibiofibular         Talocrual               Neurological Screen:  No radiating symptoms down leg  Functional Mobility: Limited tolerance of walking and standing. TUG test: 13.30 sec  Sit to Stand= keeps left leg extended but able to stand without use of hands.   Squat= limtied by 50% decreased weight bearing left LE, decreased knee flexion. Balance:    Single leg stance: R= 30 sec / L=10 sec   Body Structures Involved:  1. Joints  2. Muscles  3. Ligaments Body Functions Affected:  1. Sensory/Pain  2. Neuromusculoskeletal  3. Movement Related Activities and Participation Affected:  1. Mobility  2. Self Care   Number of elements that affect the Plan of Care: 4+: HIGH COMPLEXITY   CLINICAL PRESENTATION:   Presentation: Evolving clinical presentation with changing clinical characteristics: MODERATE COMPLEXITY   CLINICAL DECISION MAKING:   Outcome Measure: Tool Used: Lower Extremity Functional Scale (LEFS)  Score:  Initial: 31/80 Most Recent: 37/80 (Date:9/26/18 )   Interpretation of Score: 20 questions each scored on a 5 point scale with 0 representing \"extreme difficulty or unable to perform\" and 4 representing \"no difficulty\". The lower the score, the greater the functional disability. 80/80 represents no disability. Minimal detectable change is 9 points. Score 80 79-63 62-48 47-32 31-16 15-1 0   Modifier CH CI CJ CK CL CM CN     ? Mobility - Walking and Moving Around:     - CURRENT STATUS: CL - 60%-79% impaired, limited or restricted    - GOAL STATUS: CK - 40%-59% impaired, limited or restricted    - D/C STATUS:  CK - 40%-59% impaired, limited or restricted    Medical Necessity:   · Skilled intervention continues to be required due to current impairment. Reason for Services/Other Comments:  · Patient continues to require skilled intervention due to medical complications and patient unable to attend/participate in therapy as expected. TREATMENT:   (In addition to Assessment/Re-Assessment sessions the following treatments were rendered)    · Pre-Treatment Pain/ Symptoms: My back is feeling much better, I know what to do to make it feel better. Can go down stairs one after the other if he goes slow.        THERAPEUTIC EXERCISE: (45 minutes):  Exercises per grid below to improve mobility, strength and balance. Required minimal visual and verbal cues to promote proper body alignment, promote proper body posture and promote proper body mechanics. Progressed resistance, range and repetitions as indicated.        Date:  8/23/18 Date:  8/28/18 Date:  8/30/18 Date  9/5/18 Date  9/7/18 Date  9/10/18 Date  9/12/18 Date  9/17/18 Date  9/19/18 Date 9/24/18 Date  9/26/18   Activity/Exercise Parameters Parameters Parameters Parameters parameters parameters parameters parameters  parameters parameters   Patient education Treatment expectations, pre/post op expectations, HEP Progression of HEP HEP progression  HEP progressin      Review of DC HEP   Heel slide 10 x 10 x            Quad sets 10 x 20 x  20 x          SAQ 2 x 10 2 x 10 with laser feedback 3 x 15 with laser 3 x 15 with laser       30 x   SLR 2 x 10  20 x 20 x 30 x With laser   30 x    30 x   Knee extension stretch 2 min 3 min   3 min      3 min   Nu step 8 min 8 min 8 min 10 min 10 min 10 min level 4.6 10 min 4.6 level 10 min 10 min level 5 10 min level 5 10 min level 5   bridges  20 x 20 x 30 x With band above kneesknee      30 x   clams  20 x 20 x           Hip abd standing   Red band 20x ea 25 x ea Lateral walk 3 min orange band above knees 3 min 3 min 5 min   5 min    Therapy ball knees to chest    3 min 30 x with hip knee ext to flex 30 x         Ball rotation with knees   3 min 30 x          Hip hinge stretch   6 x  6 x 10 x 10 x 10 x      Standing TKES with band    30 x blue band Heavy red band Heavy black band 3 min 4 min heavy black 2 x 20 heavy black bans 50 x with laser feed back, heavy black band 50 x laser feedback 50 x   Standing knee flex lunge position    Pain free ROM with laser guidance 20 x          Hip hinge with pole     20 x 20 x 20 x       LAQ     30 x with laser for terminal extension  5 lbs 10 sec  5lbs with laser feedback 3 x 20 30x2 5lbs 7 lbs 30 x 7 lbs 20 x   Calf raise      On stair 10 x 12 x 20 x 20x 20 x 20 x   Dead lift       10 lbs 2 x 10 Black heavy band  2 x 10   2 x10 2 x 10 black band     Shuttle squat      30 x 3 cords 30 x 4 cords 30 x 4 cords      Therapy ball sitting flexion stretch      5 min        Tilt board        4 min       Lateral walk        [place] band 100 ft 100 ft 120 ft orange 120 ft orange   Monster walk        Orange band 50 ft 50 50 ft orange               Wall squat pain free range 10 x     Treatment/Session Assessment:      · Post-Treatment Pain/ Symptoms: Mr Mookie Sebastian has made improvements in range of motion and strength. Reports improved walking tolerance. ·   Compliance with Program/Exercises: Performing ex daily as instructed. · Recommendations/Intent for next treatment session: Progress to HEP/DC next visit.     Future Appointments  Date Time Provider Tennille Hien   2/25/2019 9:30 AM Efren Santos MD [facility]       Total Treatment Duration:  45 min Therapeutic EX   PT Patient Time In/Time Out  Time In: 0985  Time Out: [facility] PT

## 2018-09-27 ENCOUNTER — APPOINTMENT (RX ONLY)
Dept: URBAN - METROPOLITAN AREA CLINIC 24 | Facility: CLINIC | Age: 68
Setting detail: DERMATOLOGY
End: 2018-09-27

## 2018-09-27 DIAGNOSIS — Z48.01 ENCOUNTER FOR CHANGE OR REMOVAL OF SURGICAL WOUND DRESSING: ICD-10-CM

## 2018-09-27 PROCEDURE — ? SUTURE REMOVAL (GLOBAL PERIOD)

## 2018-09-27 ASSESSMENT — LOCATION SIMPLE DESCRIPTION DERM: LOCATION SIMPLE: RIGHT UPPER BACK

## 2018-09-27 ASSESSMENT — LOCATION DETAILED DESCRIPTION DERM: LOCATION DETAILED: RIGHT MID-UPPER BACK

## 2018-09-27 ASSESSMENT — LOCATION ZONE DERM: LOCATION ZONE: TRUNK

## 2018-09-27 NOTE — PROCEDURE: SUTURE REMOVAL (GLOBAL PERIOD)
Add 24248 Cpt? (Important Note: In 2017 The Use Of 57919 Is Being Tracked By Cms To Determine Future Global Period Reimbursement For Global Periods): no
Detail Level: Simple

## 2018-10-29 NOTE — PROGRESS NOTES
I am accessing Mr. Pratt's chart as a part of our department's internal chart auditing process. I certify that Mr. Kalyani Chicas is, or was, a patient in our department.   Thank you,  Anne Perez, DPT  10/29/2018

## 2018-11-12 ENCOUNTER — HOSPITAL ENCOUNTER (OUTPATIENT)
Dept: SURGERY | Age: 68
Discharge: HOME | End: 2018-11-12
Payer: MEDICARE

## 2018-11-12 ENCOUNTER — HOSPITAL ENCOUNTER (OUTPATIENT)
Dept: PHYSICAL THERAPY | Age: 68
Discharge: HOME | End: 2018-11-12
Payer: MEDICARE

## 2018-11-12 VITALS
WEIGHT: 213 LBS | HEIGHT: 74 IN | OXYGEN SATURATION: 98 % | SYSTOLIC BLOOD PRESSURE: 170 MMHG | BODY MASS INDEX: 27.34 KG/M2 | HEART RATE: 59 BPM | RESPIRATION RATE: 18 BRPM | DIASTOLIC BLOOD PRESSURE: 79 MMHG | TEMPERATURE: 98 F

## 2018-11-12 LAB
ANION GAP SERPL CALC-SCNC: 6 MMOL/L
APPEARANCE UR: CLEAR
APTT PPP: 28.9 SEC (ref 23.2–35.3)
BACTERIA SPEC CULT: NORMAL
BASOPHILS # BLD: 0 K/UL (ref 0–0.2)
BASOPHILS NFR BLD: 0 % (ref 0–2)
BILIRUB UR QL: NEGATIVE
BUN SERPL-MCNC: 12 MG/DL (ref 8–23)
CALCIUM SERPL-MCNC: 8.9 MG/DL (ref 8.3–10.4)
CHLORIDE SERPL-SCNC: 94 MMOL/L (ref 98–107)
CO2 SERPL-SCNC: 30 MMOL/L (ref 21–32)
COLOR UR: YELLOW
CREAT SERPL-MCNC: 1.01 MG/DL (ref 0.8–1.5)
DIFFERENTIAL METHOD BLD: ABNORMAL
EOSINOPHIL # BLD: 0.9 K/UL (ref 0–0.8)
EOSINOPHIL NFR BLD: 13 % (ref 0.5–7.8)
ERYTHROCYTE [DISTWIDTH] IN BLOOD BY AUTOMATED COUNT: 12.8 %
GLUCOSE SERPL-MCNC: 86 MG/DL (ref 65–100)
GLUCOSE UR STRIP.AUTO-MCNC: NEGATIVE MG/DL
HCT VFR BLD AUTO: 39.2 % (ref 41.1–50.3)
HGB BLD-MCNC: 12.6 G/DL (ref 13.6–17.2)
HGB UR QL STRIP: NEGATIVE
IMM GRANULOCYTES # BLD: 0 K/UL (ref 0–0.5)
IMM GRANULOCYTES NFR BLD AUTO: 0 % (ref 0–5)
INR PPP: 1.1
KETONES UR QL STRIP.AUTO: NEGATIVE MG/DL
LEUKOCYTE ESTERASE UR QL STRIP.AUTO: NEGATIVE
LYMPHOCYTES # BLD: 2.9 K/UL (ref 0.5–4.6)
LYMPHOCYTES NFR BLD: 42 % (ref 13–44)
MCH RBC QN AUTO: 29 PG (ref 26.1–32.9)
MCHC RBC AUTO-ENTMCNC: 32.1 G/DL (ref 31.4–35)
MCV RBC AUTO: 90.1 FL (ref 79.6–97.8)
MONOCYTES # BLD: 0.6 K/UL (ref 0.1–1.3)
MONOCYTES NFR BLD: 9 % (ref 4–12)
NEUTS SEG # BLD: 2.5 K/UL (ref 1.7–8.2)
NEUTS SEG NFR BLD: 37 % (ref 43–78)
NITRITE UR QL STRIP.AUTO: NEGATIVE
NRBC # BLD: 0 K/UL (ref 0–0.2)
PH UR STRIP: 7 [PH] (ref 5–9)
PLATELET # BLD AUTO: 231 K/UL (ref 150–450)
PMV BLD AUTO: 9.9 FL (ref 9.4–12.3)
POTASSIUM SERPL-SCNC: 4.2 MMOL/L (ref 3.5–5.1)
PROT UR STRIP-MCNC: NEGATIVE MG/DL
PROTHROMBIN TIME: 14.1 SEC (ref 11.5–14.5)
RBC # BLD AUTO: 4.35 M/UL (ref 4.23–5.6)
SERVICE CMNT-IMP: NORMAL
SODIUM SERPL-SCNC: 130 MMOL/L (ref 136–145)
SP GR UR REFRACTOMETRY: 1.01 (ref 1–1.02)
UROBILINOGEN UR QL STRIP.AUTO: 0.2 EU/DL (ref 0.2–1)
WBC # BLD AUTO: 6.8 K/UL (ref 4.3–11.1)

## 2018-11-12 PROCEDURE — G8980 MOBILITY D/C STATUS: HCPCS

## 2018-11-12 PROCEDURE — 85025 COMPLETE CBC W/AUTO DIFF WBC: CPT

## 2018-11-12 PROCEDURE — G8978 MOBILITY CURRENT STATUS: HCPCS

## 2018-11-12 PROCEDURE — 85610 PROTHROMBIN TIME: CPT

## 2018-11-12 PROCEDURE — 81003 URINALYSIS AUTO W/O SCOPE: CPT

## 2018-11-12 PROCEDURE — 80048 BASIC METABOLIC PNL TOTAL CA: CPT

## 2018-11-12 PROCEDURE — 87641 MR-STAPH DNA AMP PROBE: CPT

## 2018-11-12 PROCEDURE — 77030027138 HC INCENT SPIROMETER -A

## 2018-11-12 PROCEDURE — 36415 COLL VENOUS BLD VENIPUNCTURE: CPT

## 2018-11-12 PROCEDURE — 85730 THROMBOPLASTIN TIME PARTIAL: CPT

## 2018-11-12 PROCEDURE — 97161 PT EVAL LOW COMPLEX 20 MIN: CPT

## 2018-11-12 PROCEDURE — G8979 MOBILITY GOAL STATUS: HCPCS

## 2018-11-12 RX ORDER — ACETAMINOPHEN 325 MG/1
325 TABLET ORAL
COMMUNITY

## 2018-11-12 RX ORDER — CELECOXIB 200 MG/1
200 CAPSULE ORAL DAILY
COMMUNITY
End: 2019-02-25

## 2018-11-12 NOTE — PROGRESS NOTES
Aristeo Jeong : (89 y.o.) [address] at [address], [place] [address]. [phone] Physical Therapy Prehab Plan of Treatment and Evaluation Summary:2018 ICD-10: Treatment Diagnosis:  
· Pain in Left Knee (M25.562) · Stiffness of Left Knee, Not elsewhere classified (M25.662) · Difficulty in walking, Not elsewhere classified (R26.2) Precautions/Allergies:  
Lisinopril; Losartan; Statins-hmg-coa reductase inhibitors; and Tape [adhesive]  MEDICAL/REFERRING DIAGNOSIS: 
Unilateral primary osteoarthritis, left knee [M17.12] REFERRING PHYSICIAN: Abena Aguilera., * DATE OF SURGERY: 08 Assessment:  
Comments:  Pt. Plans to go home with spouse. PROBLEM LIST (Impacting functional limitations): 
Mr. Mallorie Hooper presents with the following left lower extremity(s) problems: 1. Strength 2. Range of Motion 3. Home Exercise Program 
4. Pain INTERVENTIONS PLANNED: 
1. Home Exercise Program 
2. Educational Discussion TREATMENT PLAN: Effective Dates: 2018 TO 2018. Frequency/Duration: Patient to continue to perform home exercise program at least twice per day up until his surgery. GOALS: (Goals have been discussed and agreed upon with patient.) Discharge Goals: Time Frame: 1 Day 1. Patient will demonstrate independence with a home exercise program designed to increase strength, range of motion and pain control to minimize functional deficits and optimize patient for total joint replacement. Rehabilitation Potential For Stated Goals: Good Regarding Fernando Pratt's therapy, I certify that the treatment plan above will be carried out by a therapist or under their direction. Thank you for this referral, 
Jackelyn Viramontes, PT     
    
 
 
HISTORY:  
Present Symptoms: 
Pain Intensity 1: (8 at worst) Pain Location 1: Knee History of Present Injury/Illness (Reason for Referral): 
 Medical/Referring Diagnosis: Unilateral primary osteoarthritis, left knee [M17.12] Past Medical History/Comorbidities:  
Mr. Jai Reilly  has a past medical history of Arthritis, CAD (coronary artery disease), Chest pain, unspecified, Environmental and seasonal allergies, H/O echocardiogram, Hyperlipidemia, Hypertension, Mitral valve regurgitation, NSTEMI (non-ST elevated myocardial infarction) ([facility] [address] Post PTCA, Varicose veins of both lower extremities, and Varicose veins of legs. Mr. Jai Reilly  has a past surgical history that includes pr cardiac surg procedure unlist (2012); hx knee arthroscopy (Left, 2001); hx other surgical (2003); hx lumbar diskectomy (1988); hx other surgical; and hx colonoscopy. Social History/Living Environment:  
Home Environment: Private residence # Steps to Enter: 3 Rails to Enter: No 
One/Two Story Residence: Two story, live on 1st floor # of Interior Steps: 12 Interior Rails: Left Lift Chair Available: No 
Living Alone: No 
Support Systems: Spouse/Significant Other/Partner Patient Expects to be Discharged to[de-identified] Private residence Current DME Used/Available at Home: Cane, straight, Crutches, Commode, bedside, Walker, rolling, Shower chair Tub or Shower Type: Shower Work/Activity: 
retired Dominant Side: 
RIGHT Current Medications:  See Pre-assessment nursing note Number of Personal Factors/Comorbidities that affect the Plan of Care: 1-2: MODERATE COMPLEXITY EXAMINATION:  
ADLs (Current Functional Status):  
Ambulation: 
[x] Independent 
[] Walk Indoors Only 
[] Walk Outdoors [] Use Assistive Device [] Use Wheelchair Only Dressing: 
[x] Independent Requires Assistance from Someone for: 
[] Sock/Shoes 
[] Pants 
[] Everything Bathing/Showering:  
[x] Independent 
[] Requires Assistance from Someone 
[] Sponge Bath Only Household Activities: 
[x] Routine house and yard work 
[] Light Housework Only 
[] None Observation/Orthostatic Postural Assessment: Exceptions to WDLRounded shoulders ROM/Flexibility:  
Gross Assessment: Yes AROM: Within functional limits(right LE) LLE Assessment LLE Assessment (WDL): Exception to WDL 
LLE AROM 
L Knee Flexion: 100 L Knee Extension: 3 Strength:  
Gross Assessment: Yes 
Strength: Generally decreased, functional(right LE) LLE Strength L Knee Flexion: 4 L Knee Extension: 4 Functional Mobility:   
Gross Assessment: Yes 
 
Gait Description (WDL): Exceptions to [facility] Stand to Sit: Additional time, Independent Sit to Stand: Independent, Additional time Distance (ft): 250 Feet (ft) Ambulation - Level of Assistance: Independent Speed/Muna: Delayed Stance: Left decreased Gait Abnormalities: Antalgic Balance:   
Sitting: Intact Standing: Intact Body Structures Involved: 1. Bones 2. Joints 3. Muscles 4. Ligaments Body Functions Affected: 1. Movement Related Activities and Participation Affected: 1. Mobility Number of elements that affect the Plan of Care: 3: MODERATE COMPLEXITY CLINICAL PRESENTATION:  
Presentation: Stable and uncomplicated: LOW COMPLEXITY CLINICAL DECISION MAKING:  
Outcome Measure: Tool Used: Lower Extremity Functional Scale (LEFS) Score:  Initial: 26/80 Most Recent: X/80 (Date: -- ) Interpretation of Score: 20 questions each scored on a 5 point scale with 0 representing \"extreme difficulty or unable to perform\" and 4 representing \"no difficulty\". The lower the score, the greater the functional disability. 80/80 represents no disability. Minimal detectable change is 9 points. Score 80 79-65 64-49 48-33 32-17 16-1 0 Modifier CH CI CJ CK CL CM CN  
 
? Mobility - Walking and Moving Around:  
  - CURRENT STATUS: CL - 60%-79% impaired, limited or restricted  - GOAL STATUS: CL - 60%-79% impaired, limited or restricted  - D/C STATUS:  CL - 60%-79% impaired, limited or restricted Medical Necessity: · Mr. Brandie Carmichael is expected to optimize his lower extremity strength and ROM in preparation for joint replacement surgery. Reason for Services/Other Comments: · Achieve baseline assesment of musculoskeletal system, functional mobility and home environment. , educate in PT HEP in preparation for surgery, educate in hospital plan of care. Use of outcome tool(s) and clinical judgement create a POC that gives a: Clear prediction of patient's progress: LOW COMPLEXITY  
TREATMENT:  
Treatment/Session Assessment:  Patient was instructed in PT- HEP to increase strength and ROM in LEs. Answered all questions. · Post session pain:  Knee pain · Compliance with Program/Exercises: compliant most of the time. Total Treatment Duration: PT Patient Time In/Time Out Time In: 8209 Time Out: 1030 Miguel Dumont, PT

## 2018-11-12 NOTE — PERIOP NOTES
Dr. Nohemi Shaw with anesthesia reviewed EKG's dated 08/20/18 & 08/18/17. No orders received. Okay to proceed.

## 2018-11-12 NOTE — PERIOP NOTES
Labs dated 11/12/18 routed via [employer] to patients PCP, Dr. Di Dan and to ordering physician Dr. Radha Wade.

## 2018-11-12 NOTE — PROGRESS NOTES
11/12/18 1030 Oxygen Therapy O2 Sat (%) 100 % Pulse via Oximetry 62 beats per minute O2 Device Room air Pre-Treatment Breath Sounds Bilateral Clear Pre FEV1 (liters) 2.6 liters % Predicted 71 Incentive Spirometry Treatment Actual Volume (ml) 2000 ml Initial respiratory Assessment completed with pt. Pt was interviewed and evaluated in Joint camp prior to surgery. Patient ID: 
Jin Miller [id number] 
77 y.o. 
1950 Surgeon: Dr. Ish Smith Date of Surgery: The linked surgery was not found. Please check manually. Procedure: Total Left Knee Arthroplasty Primary Care Physician: Tisha Vazquez [phone] Specialists:  
                    
          Pt instructed in the use of Incentive Spirometry. Pt instructed to bring Incentive Spirometer back on date of surgery & to start using Is upon return to pt room. Pt taught proper cough technique History of smoking:   3 ppd for 20 years Quit date:       10/5/1981 Secondhand smoke:FATHER Past procedures with Oxygen desaturation:NONE Past Medical History:  
Diagnosis Date  Arthritis  CAD (coronary artery disease)  Chest pain, unspecified 8/31/2012  Environmental and seasonal allergies  H/O echocardiogram 02/01/2018 EF 55-60%  Hyperlipidemia  Hypertension 8/31/2012  Mitral valve regurgitation   
 minimal   
 NSTEMI (non-ST elevated myocardial infarction) ([facility] [address] 09/02/2012  
 3.5X23 Xience MLAD  Post PTCA  Varicose veins of both lower extremities 7/6/2016  Varicose veins of legs Respiratory history:DENIES SOB Respiratory meds:  NA 
 
 
                                
FAMILY PRESENT:            SPOUSE, PAST SLEEP STUDY:                       NO 
HX OF SALMA:                                      NO SALMA assessment:  PT HAS LOST WEIGHT- USED TO WEIGH OVER 300 lbs SLEEPS ON SIDE      ,       BACK        &        STOMACH 
                                          
 
 
PHYSICAL EXAM Body mass index is 27.35 kg/m². Visit Vitals /79 (BP 1 Location: Left arm, BP Patient Position: At rest) Pulse (!) 59 Temp 98 °F (36.7 °C) Resp 18 Ht 6' 2\" (1.88 m) Wt 96.6 kg (213 lb) SpO2 98% BMI 27.35 kg/m² Neck circumference:   41   cm Loud snoring:       SOME Witnessed apnea or wakening gasping or choking:,             DENIES, Awakens with headaches:                                                  DENIES Morning or daytime tiredness/ sleepiness:                    DENIES - NAPS FOR 20 MIN WHEN HE SITS DOWN 
                                                                                    
Dry mouth or sore throat in morning:                YES Pandey stage:  4 SACS score:18 STOP/BANG:3 
 
                         
CPAP:                       NONE 
                                  
 
 
 
 
     CONT SAT HS Referrals: DECLINED LINDA Pt. Phone Number:

## 2018-11-12 NOTE — PERIOP NOTES
Recent Results (from the past 12 hour(s)) CBC WITH AUTOMATED DIFF Collection Time: 11/12/18 10:30 AM  
Result Value Ref Range WBC 6.8 4.3 - 11.1 K/uL  
 RBC 4.35 4.23 - 5.6 M/uL  
 HGB 12.6 (L) 13.6 - 17.2 g/dL HCT 39.2 (L) 41.1 - 50.3 % MCV 90.1 79.6 - 97.8 FL  
 MCH 29.0 26.1 - 32.9 PG  
 MCHC 32.1 31.4 - 35.0 g/dL  
 RDW 12.8 % PLATELET 306 546 - 340 K/uL MPV 9.9 9.4 - 12.3 FL ABSOLUTE NRBC 0.00 0.0 - 0.2 K/uL  
 DF AUTOMATED NEUTROPHILS 37 (L) 43 - 78 % LYMPHOCYTES 42 13 - 44 % MONOCYTES 9 4.0 - 12.0 % EOSINOPHILS 13 (H) 0.5 - 7.8 % BASOPHILS 0 0.0 - 2.0 % IMMATURE GRANULOCYTES 0 0.0 - 5.0 %  
 ABS. NEUTROPHILS 2.5 1.7 - 8.2 K/UL  
 ABS. LYMPHOCYTES 2.9 0.5 - 4.6 K/UL  
 ABS. MONOCYTES 0.6 0.1 - 1.3 K/UL  
 ABS. EOSINOPHILS 0.9 (H) 0.0 - 0.8 K/UL  
 ABS. BASOPHILS 0.0 0.0 - 0.2 K/UL  
 ABS. IMM. GRANS. 0.0 0.0 - 0.5 K/UL METABOLIC PANEL, BASIC Collection Time: 11/12/18 10:30 AM  
Result Value Ref Range Sodium 130 (L) 136 - 145 mmol/L Potassium 4.2 3.5 - 5.1 mmol/L Chloride 94 (L) 98 - 107 mmol/L  
 CO2 30 21 - 32 mmol/L Anion gap 6 mmol/L Glucose 86 65 - 100 mg/dL BUN 12 8 - 23 MG/DL Creatinine 1.01 0.8 - 1.5 MG/DL  
 GFR est AA >60 >60 ml/min/1.73m2 GFR est non-AA >60 ml/min/1.73m2 Calcium 8.9 8.3 - 10.4 MG/DL PROTHROMBIN TIME + INR Collection Time: 11/12/18 10:30 AM  
Result Value Ref Range Prothrombin time 14.1 11.5 - 14.5 sec INR 1.1 PTT Collection Time: 11/12/18 10:30 AM  
Result Value Ref Range aPTT 28.9 23.2 - 35.3 SEC URINALYSIS W/ RFLX MICROSCOPIC Collection Time: 11/12/18 10:30 AM  
Result Value Ref Range Color YELLOW Appearance CLEAR Specific gravity 1.007 1.001 - 1.023    
 pH (UA) 7.0 5.0 - 9.0 Protein NEGATIVE  NEG mg/dL Glucose NEGATIVE  mg/dL Ketone NEGATIVE  NEG mg/dL Bilirubin NEGATIVE  NEG Blood NEGATIVE  NEG Urobilinogen 0.2 0.2 - 1.0 EU/dL Nitrites NEGATIVE  NEG  Leukocyte Esterase NEGATIVE  NEG

## 2018-11-12 NOTE — PERIOP NOTES
Patient verified name and . Order for consent found in EHR and matches case posting; patient verified. Type 3 surgery, PAT joint assessment complete. Labs per surgeon: cbc, bmp, ua, pt/inr, ptt; results within anesthesia limits. Per MADISON Breen NA to be rechecked on the DOS; order signed and held in [facility]. MRSA/MSSA swab results pending--pharmacy to follow up. T&S DOS; order signed and held in [facility]. Labs per anesthesia protocol: All required lab work included in surgeon's orders. EKG:completed 18; results to be reviewed by anesthesia. EKG dated 17 placed on chart for comparison. Most recent cardiology note (18), cardiology clearance letter (10/31/18), and Echo (18) placed on chart for anesthesia reference. Patient's  today. MADISON Breen made aware. Radha Johnson completed in-person assessment and ordered repeat NA on the DOS; order signed and held in [facility]. Hibiclens and instructions to return bottle on DOS given per hospital policy. Patient provided with handouts including Guide to Surgery, Pain Management, Hand Hygiene, Blood Transfusion Education, and [facility] Brochure. Patient answered medical/surgical history questions at their best of ability. All prior to admission medications documented in [facility]. Original medication prescription bottle NOT visualized during patient appointment. Patient instructed to hold all vitamins 7 days prior to surgery and NSAIDS 5 days prior to surgery. Medications to be held: non-prescribed vitamins/supplements/herbals. Patient instructed to continue 81 mg ASA and Tylenol PRN. Patient instructed to continue previous medications as prescribed prior to surgery and to take the following medications the day of surgery according to anesthesia guidelines with a small sip of water: Amlodipine if needed, 81 mg ASA, and Tylenol if needed. Patient instructed to bring Zyrtec, Nitrostat, incentive spirometer, and bottle of Hibiclens to the hospital on the DOS. Patient teach back successful and patient demonstrates knowledge of instruction.

## 2018-11-13 ENCOUNTER — APPOINTMENT (OUTPATIENT)
Dept: PHYSICAL THERAPY | Age: 68
End: 2018-11-13

## 2018-11-13 PROBLEM — E87.1 HYPONATREMIA: Status: ACTIVE | Noted: 2018-11-13

## 2018-11-13 NOTE — ADVANCED PRACTICE NURSE
Total Joint Surgery Preoperative Chart Review Patient ID: 
Gladis Vera [id number] 
77 y.o. 
1950 Surgeon: Dr. Bakari Morgan Date of Surgery: 2018 Procedure: Total Left Knee Arthroplasty Primary Care Physician: Floyd Lara [phone] Specialty Physician(s):   
 
Subjective:  
Gladis Vera is a 77 y.o. WHITE OR  male who presents for preoperative evaluation for Total Left Knee arthroplasty. This is a preoperative chart review note based on data collected by the nurse at the surgical Pre-Assessment visit. Past Medical History:  
Diagnosis Date  Arthritis  CAD (coronary artery disease)  Chest pain, unspecified 2012  Environmental and seasonal allergies  H/O echocardiogram 2018 EF 55-60%  Hyperlipidemia  Hypertension 2012  Mitral valve regurgitation   
 minimal   
 NSTEMI (non-ST elevated myocardial infarction) ([facility] [address] 2012  
 3.5X23 Xience MLAD  Post PTCA  Varicose veins of both lower extremities 2016  Varicose veins of legs Past Surgical History:  
Procedure Laterality Date  CARDIAC SURG PROCEDURE UNLIST   PCI x 1  
 HX COLONOSCOPY    
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    
 basal cell cancer removed x2 Family History Problem Relation Age of Onset  Heart Disease Sister  Coronary Artery Disease Father  Diabetes Father  No Known Problems Mother Social History Tobacco Use  Smoking status: Former Smoker Last attempt to quit: 10/5/1981 Years since quittin.1  Smokeless tobacco: Never Used Substance Use Topics  Alcohol use: Yes Comment: rare Prior to Admission medications Medication Sig Start Date End Date Taking? Authorizing Provider  
celecoxib (CELEBREX) 200 mg capsule Take 200 mg by mouth daily.    Yes Provider, Historical  
 vit C/vit E/lutein/min/omega-3 (OCUVITE PO) Take  by mouth daily. Yes Provider, Historical  
acetaminophen (TYLENOL) 325 mg tablet Take 325 mg by mouth every four (4) hours as needed for Pain. Yes Provider, Historical  
b complex vitamins tablet Take 1 Tab by mouth daily. Yes Provider, Historical  
Cetirizine (ZYRTEC) 10 mg cap Take  by mouth. Yes Provider, Historical  
amLODIPine (NORVASC) 5 mg tablet Take 1 Tab by mouth as needed. Patient taking differently: Take 5 mg by mouth as needed. For BP > 150/90 8/20/18  Yes Kita Joyner MD  
nitroglycerin (NITROSTAT) 0.4 mg SL tablet 1 Tab by SubLINGual route every five (5) minutes as needed for Chest Pain. Patient taking differently: 0.4 mg by SubLINGual route every five (5) minutes as needed for Chest Pain. Never have had to use 8/20/18  Yes Kita Joyner MD  
magnesium 250 mg tab Take  by mouth. Yes Provider, Historical  
coenzyme Q-10 (CO Q-10) 200 mg capsule Take 400 mg by mouth daily. Yes Provider, Historical  
aspirin 81 mg chewable tablet Take 1 Tab by mouth daily. 9/2/12  Yes Layla Mckenna Allergies Allergen Reactions  Lisinopril Cough  Losartan Unknown (comments) Patient unsure of reaction  Statins-Hmg-Coa Reductase Inhibitors Myalgia  Tape [Adhesive] Rash  
  and bandaids Objective:  
 
Physical Exam:  
 
Visit Vitals /79 (BP 1 Location: Left arm, BP Patient Position: At rest) Pulse (!) 59 Temp 98 °F (36.7 °C) Resp 18 Ht 6' 2\" (1.88 m) Wt 96.6 kg (213 lb) SpO2 98% BMI 27.35 kg/m² ECG:   
EKG Results None Data Review:  
Labs:  
Results for Seth Harden (MRN [id number]) as of 11/13/2018 13:10 Ref. Range 11/12/2018 10:30 Sodium Latest Ref Range: 136 - 145 mmol/L 130 (L) Potassium Latest Ref Range: 3.5 - 5.1 mmol/L 4.2 Chloride Latest Ref Range: 98 - 107 mmol/L 94 (L)  
CO2 Latest Ref Range: 21 - 32 mmol/L 30  
 Anion gap Latest Units: mmol/L 6 Glucose Latest Ref Range: 65 - 100 mg/dL 86 BUN Latest Ref Range: 8 - 23 MG/DL 12 Creatinine Latest Ref Range: 0.8 - 1.5 MG/DL 1.01 Calcium Latest Ref Range: 8.3 - 10.4 MG/DL 8.9 GFR est non-AA Latest Units: ml/min/1.73m2 >60  
GFR est AA Latest Ref Range: >60 ml/min/1.73m2 >60 Problem List: 
) Patient Active Problem List  
Diagnosis Code  Hypertension I10  
 CAD (coronary artery disease) I25.10  Hyperlipidemia E78.5  Mitral valve regurgitation I34.0  Varicose veins of both lower extremities I83.93  
 Hyponatremia E87.1 Total Joint Surgery Pre-Assessment Recommendations:    
Patient with multiple comorbidities including: 
Advanced age 77, CAD and HTN. Patient would benefit from inpatient hospitalization with total knee surgery. Patient with low sodium today and states that this is an intermittent problem. Asymptomatic at this time. He has been drinking approximately 2 gallons of water daily. I instructed him to cut back to 1 gallon daily. Will repeat sodium on DOS. Recommend continuous saturation monitoring hours of sleep, during hospitalization. Signed By: WISAM Tellez November 13, 2018

## 2018-11-29 NOTE — PROGRESS NOTES
[facility] [employer] [facility] [employer] Pre Operative History and Physical Exam 
 
Patient ID: 
Shanika Liu [id number] 
77 y.o. 
1950 Today: November 29, 2018 Assessment: 1. Arthritis of the left knee Plan: 1. Proceed with scheduled Procedure(s) (LRB): 
KNEE ARTHROPLASTY TOTAL LEFT/ STYKER/ FNB (Left) CC: Left knee pain HPI:   The patient has end stage arthritis of the left knee. The patient was evaluated and examined during a consultation prior to this office visit. There have been no changes to the patient's orthopedic condition since the initial consultation. The patient has failed previous conservative treatment for this condition including antiinflammatories , and lifestyle modifications. The necessity for joint replacement is present. The patient will be admitted the day of surgery for Procedure(s) (LRB): 
KNEE ARTHROPLASTY TOTAL LEFT/ STYKER/ FNB (Left) Past Medical/Surgical History: 
Past Medical History:  
Diagnosis Date  Arthritis  CAD (coronary artery disease)  Chest pain, unspecified 8/31/2012  Environmental and seasonal allergies  H/O echocardiogram 02/01/2018 EF 55-60%  Hyperlipidemia  Hypertension 8/31/2012  Mitral valve regurgitation   
 minimal   
 NSTEMI (non-ST elevated myocardial infarction) ([facility] [address] 09/02/2012  
 3.5X23 Xience MLAD  Post PTCA  Varicose veins of both lower extremities 7/6/2016  Varicose veins of legs Past Surgical History:  
Procedure Laterality Date  CARDIAC SURG PROCEDURE UNLIST  2012 PCI x 1  
 HX COLONOSCOPY    
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    
 basal cell cancer removed x2 Allergies: Allergies Allergen Reactions  Lisinopril Cough  Losartan Unknown (comments) Patient unsure of reaction  Statins-Hmg-Coa Reductase Inhibitors Myalgia  Tape [Adhesive] Rash  
  and bandaids Physical Exam:  
General: NAD, Alert, Oriented, Appears their stated age HEENT: NC/AT, PERRL Skin: No rashes, lesions or wounds seen Psych: normal affect Heart: Regular Rate, Rhythm Lungs: unlabored respirations, normal breath sounds Abdomen: Soft and non-distended Ortho: Pain with limited ROM of the left knee Neuro: no focal defects, sensation is equal bilaterally Lymph: no lymphadenopathy Meds:  
No current facility-administered medications for this encounter. Current Outpatient Medications Medication Sig  celecoxib (CELEBREX) 200 mg capsule Take 200 mg by mouth daily.  vit C/vit E/lutein/min/omega-3 (OCUVITE PO) Take  by mouth daily.  acetaminophen (TYLENOL) 325 mg tablet Take 325 mg by mouth every four (4) hours as needed for Pain.  b complex vitamins tablet Take 1 Tab by mouth daily.  Cetirizine (ZYRTEC) 10 mg cap Take  by mouth.  amLODIPine (NORVASC) 5 mg tablet Take 1 Tab by mouth as needed. (Patient taking differently: Take 5 mg by mouth as needed. For BP > 150/90)  nitroglycerin (NITROSTAT) 0.4 mg SL tablet 1 Tab by SubLINGual route every five (5) minutes as needed for Chest Pain. (Patient taking differently: 0.4 mg by SubLINGual route every five (5) minutes as needed for Chest Pain. Never have had to use)  magnesium 250 mg tab Take  by mouth.  coenzyme Q-10 (CO Q-10) 200 mg capsule Take 400 mg by mouth daily.  aspirin 81 mg chewable tablet Take 1 Tab by mouth daily. Labs: Hospital Outpatient Visit on 11/12/2018 Component Date Value Ref Range Status  WBC 11/12/2018 6.8  4.3 - 11.1 K/uL Final  
 RBC 11/12/2018 4.35  4.23 - 5.6 M/uL Final  
 HGB 11/12/2018 12.6* 13.6 - 17.2 g/dL Final  
 HCT 11/12/2018 39.2* 41.1 - 50.3 % Final  
 MCV 11/12/2018 90.1  79.6 - 97.8 FL Final  
 MCH 11/12/2018 29.0  26.1 - 32.9 PG Final  
 MCHC 11/12/2018 32.1  31.4 - 35.0 g/dL Final  
  RDW 11/12/2018 12.8  % Final  
 PLATELET 01/09/6212 333  150 - 450 K/uL Final  
 MPV 11/12/2018 9.9  9.4 - 12.3 FL Final  
 ABSOLUTE NRBC 11/12/2018 0.00  0.0 - 0.2 K/uL Final  
 **Note: Absolute NRBC parameter is now reported with Hemogram**  
 DF 11/12/2018 AUTOMATED    Final  
 NEUTROPHILS 11/12/2018 37* 43 - 78 % Final  
 LYMPHOCYTES 11/12/2018 42  13 - 44 % Final  
 MONOCYTES 11/12/2018 9  4.0 - 12.0 % Final  
 EOSINOPHILS 11/12/2018 13* 0.5 - 7.8 % Final  
 BASOPHILS 11/12/2018 0  0.0 - 2.0 % Final  
 IMMATURE GRANULOCYTES 11/12/2018 0  0.0 - 5.0 % Final  
 ABS. NEUTROPHILS 11/12/2018 2.5  1.7 - 8.2 K/UL Final  
 ABS. LYMPHOCYTES 11/12/2018 2.9  0.5 - 4.6 K/UL Final  
 ABS. MONOCYTES 11/12/2018 0.6  0.1 - 1.3 K/UL Final  
 ABS. EOSINOPHILS 11/12/2018 0.9* 0.0 - 0.8 K/UL Final  
 ABS. BASOPHILS 11/12/2018 0.0  0.0 - 0.2 K/UL Final  
 ABS. IMM. GRANS. 11/12/2018 0.0  0.0 - 0.5 K/UL Final  
 Sodium 11/12/2018 130* 136 - 145 mmol/L Final  
 Potassium 11/12/2018 4.2  3.5 - 5.1 mmol/L Final  
 Chloride 11/12/2018 94* 98 - 107 mmol/L Final  
 CO2 11/12/2018 30  21 - 32 mmol/L Final  
 Anion gap 11/12/2018 6  mmol/L Final  
 Glucose 11/12/2018 86  65 - 100 mg/dL Final  
 Comment: 47 - 60 mg/dl Consistent with, but not fully diagnostic of hypoglycemia. 101 - 125 mg/dl Impaired fasting glucose/consistent with pre-diabetes mellitus 
> 126 mg/dl Fasting glucose consistent with overt diabetes mellitus  BUN 11/12/2018 12  8 - 23 MG/DL Final  
 Creatinine 11/12/2018 1.01  0.8 - 1.5 MG/DL Final  
 GFR est AA 11/12/2018 >60  >60 ml/min/1.73m2 Final  
 GFR est non-AA 11/12/2018 >60  ml/min/1.73m2 Final  
 Comment: (NOTE) Estimated GFR is calculated using the Modification of Diet in Renal  
Disease (MDRD) Study equation, reported for both  [facility]) and non- Americans (GFRNA), and normalized to 1.73m2  
body surface area.  The physician must decide which value applies to  
 the patient. The MDRD study equation should only be used in  
individuals age 25 or older. It has not been validated for the  
following: pregnant women, patients with serious comorbid conditions,  
or on certain medications, or persons with extremes of body size,  
muscle mass, or nutritional status.  Calcium 11/12/2018 8.9  8.3 - 10.4 MG/DL Final  
 Special Requests: 11/12/2018 NO SPECIAL REQUESTS    Final  
 Culture result: 11/12/2018 SA target not detected. A MRSA NEGATIVE, SA NEGATIVE test result does not preclude MRSA or SA nasal colonization. Final  
 Prothrombin time 11/12/2018 14.1  11.5 - 14.5 sec Final  
 INR 11/12/2018 1.1    Final  
 Comment: Suggested therapeutic INR range: 
Venous thrombosis and embolus  2.0-3.0 Prosthetic heart valve         2.5-3.5 
** Note new reference range and method ** 
  
 aPTT 11/12/2018 28.9  23.2 - 35.3 SEC Final  
 Comment: Heparin Therapeutic Range = 74 - 123 seconds In addition to factor deficiency, monitoring heparin therapy, etc., evaluation of a prolonged aPTT result should include consideration of preanalytic variables such as heparin flush contamination, specimen integrity issues, etc. 
  
 Color 11/12/2018 YELLOW    Final  
 Appearance 11/12/2018 CLEAR    Final  
 Specific gravity 11/12/2018 1.007  1.001 - 1.023   Final  
 pH (UA) 11/12/2018 7.0  5.0 - 9.0   Final  
 Protein 11/12/2018 NEGATIVE   NEG mg/dL Final  
 Glucose 11/12/2018 NEGATIVE   mg/dL Final  
 Ketone 11/12/2018 NEGATIVE   NEG mg/dL Final  
 Bilirubin 11/12/2018 NEGATIVE   NEG   Final  
 Blood 11/12/2018 NEGATIVE   NEG   Final  
 Urobilinogen 11/12/2018 0.2  0.2 - 1.0 EU/dL Final  
 Nitrites 11/12/2018 NEGATIVE   NEG   Final  
 Leukocyte Esterase 11/12/2018 NEGATIVE   NEG   Final  
 
 
 
 
 
 
 
Patient Active Problem List  
Diagnosis Code  Hypertension I10  
 CAD (coronary artery disease) I25.10  Hyperlipidemia E78.5  Mitral valve regurgitation I34.0  Varicose veins of both lower extremities I83.93  
 Hyponatremia E87.1 Signed By: DONALD Sherwood November 29, 2018

## 2018-12-04 ENCOUNTER — ANESTHESIA EVENT (OUTPATIENT)
Dept: SURGERY | Age: 68
DRG: 470 | End: 2018-12-04
Payer: MEDICARE

## 2018-12-04 NOTE — ANESTHESIA PREPROCEDURE EVALUATION
Anesthetic History Review of Systems / Medical History Patient summary reviewed Pulmonary Smoker (Former) Neuro/Psych Cardiovascular Hypertension Valvular problems/murmurs (mild): tricuspid insufficiency Past MI (2012), CAD and cardiac stents (2012) Exercise tolerance: >4 METS Comments: echocardiogram 02/01/2018 EF 55-60% GI/Hepatic/Renal 
  
 
 
 
 
 
 Endo/Other Other Findings Physical Exam 
 
Airway Mallampati: II 
TM Distance: > 6 cm Neck ROM: normal range of motion Mouth opening: Normal 
 
 Cardiovascular Regular rate and rhythm,  S1 and S2 normal,  no murmur, click, rub, or gallop Dental 
No notable dental hx Pulmonary Breath sounds clear to auscultation Abdominal 
 
 
 
 Other Findings Anesthetic Plan ASA: 3 Anesthesia type: spinal 
 
 
Post-op pain plan if not by surgeon: peripheral nerve block single Anesthetic plan and risks discussed with: Patient

## 2018-12-05 ENCOUNTER — ANESTHESIA (OUTPATIENT)
Dept: SURGERY | Age: 68
DRG: 470 | End: 2018-12-05
Payer: MEDICARE

## 2018-12-05 ENCOUNTER — HOSPITAL ENCOUNTER (INPATIENT)
Age: 68
LOS: 1 days | Discharge: HOME HEALTH | DRG: 470 | End: 2018-12-06
Attending: ORTHOPAEDIC SURGERY | Admitting: ORTHOPAEDIC SURGERY
Payer: MEDICARE

## 2018-12-05 DIAGNOSIS — Z96.652 STATUS POST LEFT KNEE REPLACEMENT: Primary | ICD-10-CM

## 2018-12-05 DIAGNOSIS — I10 ESSENTIAL HYPERTENSION: Chronic | ICD-10-CM

## 2018-12-05 DIAGNOSIS — M17.12 PRIMARY OSTEOARTHRITIS OF LEFT KNEE: ICD-10-CM

## 2018-12-05 DIAGNOSIS — E78.00 PURE HYPERCHOLESTEROLEMIA: ICD-10-CM

## 2018-12-05 PROBLEM — M19.90 OSTEOARTHRITIS: Status: ACTIVE | Noted: 2018-12-05

## 2018-12-05 LAB
ABO + RH BLD: NORMAL
BLOOD GROUP ANTIBODIES SERPL: NORMAL
GLUCOSE BLD STRIP.AUTO-MCNC: 116 MG/DL (ref 65–100)
HGB BLD-MCNC: 11.1 G/DL (ref 13.6–17.2)
SODIUM SERPL-SCNC: 137 MMOL/L (ref 136–145)
SPECIMEN EXP DATE BLD: NORMAL

## 2018-12-05 PROCEDURE — 77030037363 HC FEM INST CR  DISP STRY -C: Performed by: ORTHOPAEDIC SURGERY

## 2018-12-05 PROCEDURE — 97161 PT EVAL LOW COMPLEX 20 MIN: CPT

## 2018-12-05 PROCEDURE — 0SRD0JA REPLACEMENT OF LEFT KNEE JOINT WITH SYNTHETIC SUBSTITUTE, UNCEMENTED, OPEN APPROACH: ICD-10-PCS | Performed by: ORTHOPAEDIC SURGERY

## 2018-12-05 PROCEDURE — 94760 N-INVAS EAR/PLS OXIMETRY 1: CPT

## 2018-12-05 PROCEDURE — 74011250636 HC RX REV CODE- 250/636: Performed by: PHYSICIAN ASSISTANT

## 2018-12-05 PROCEDURE — 76942 ECHO GUIDE FOR BIOPSY: CPT | Performed by: ORTHOPAEDIC SURGERY

## 2018-12-05 PROCEDURE — 74011000250 HC RX REV CODE- 250: Performed by: ORTHOPAEDIC SURGERY

## 2018-12-05 PROCEDURE — 76010000162 HC OR TIME 1.5 TO 2 HR INTENSV-TIER 1: Performed by: ORTHOPAEDIC SURGERY

## 2018-12-05 PROCEDURE — 77030020782 HC GWN BAIR PAWS FLX 3M -B: Performed by: ANESTHESIOLOGY

## 2018-12-05 PROCEDURE — 74011250636 HC RX REV CODE- 250/636: Performed by: ANESTHESIOLOGY

## 2018-12-05 PROCEDURE — 99221 1ST HOSP IP/OBS SF/LOW 40: CPT | Performed by: PHYSICAL MEDICINE & REHABILITATION

## 2018-12-05 PROCEDURE — 94762 N-INVAS EAR/PLS OXIMTRY CONT: CPT

## 2018-12-05 PROCEDURE — 77030003665 HC NDL SPN BBMI -A: Performed by: ANESTHESIOLOGY

## 2018-12-05 PROCEDURE — 85018 HEMOGLOBIN: CPT

## 2018-12-05 PROCEDURE — 74011000258 HC RX REV CODE- 258: Performed by: ORTHOPAEDIC SURGERY

## 2018-12-05 PROCEDURE — 77030003602 HC NDL NRV BLK BBMI -B: Performed by: ANESTHESIOLOGY

## 2018-12-05 PROCEDURE — 86580 TB INTRADERMAL TEST: CPT | Performed by: PHYSICIAN ASSISTANT

## 2018-12-05 PROCEDURE — 74011250637 HC RX REV CODE- 250/637: Performed by: ANESTHESIOLOGY

## 2018-12-05 PROCEDURE — 84295 ASSAY OF SERUM SODIUM: CPT

## 2018-12-05 PROCEDURE — 77030031139 HC SUT VCRL2 J&J -A: Performed by: ORTHOPAEDIC SURGERY

## 2018-12-05 PROCEDURE — 74011250637 HC RX REV CODE- 250/637: Performed by: PHYSICIAN ASSISTANT

## 2018-12-05 PROCEDURE — 77030034849: Performed by: ORTHOPAEDIC SURGERY

## 2018-12-05 PROCEDURE — 77030019908 HC STETH ESOPH SIMS -A: Performed by: ANESTHESIOLOGY

## 2018-12-05 PROCEDURE — 77030007880 HC KT SPN EPDRL BBMI -B: Performed by: ANESTHESIOLOGY

## 2018-12-05 PROCEDURE — 65270000029 HC RM PRIVATE

## 2018-12-05 PROCEDURE — 74011250636 HC RX REV CODE- 250/636

## 2018-12-05 PROCEDURE — 97165 OT EVAL LOW COMPLEX 30 MIN: CPT

## 2018-12-05 PROCEDURE — 77030025452 HC KT TIB SZR TRTH DSP STRY -B: Performed by: ORTHOPAEDIC SURGERY

## 2018-12-05 PROCEDURE — 74011250636 HC RX REV CODE- 250/636: Performed by: ORTHOPAEDIC SURGERY

## 2018-12-05 PROCEDURE — 77030013727 HC IRR FAN PULSVC ZIMM -B: Performed by: ORTHOPAEDIC SURGERY

## 2018-12-05 PROCEDURE — 77030035643 HC BLD SAW OSC PRECIS STRY -C: Performed by: ORTHOPAEDIC SURGERY

## 2018-12-05 PROCEDURE — 77030008467 HC STPLR SKN COVD -B: Performed by: ORTHOPAEDIC SURGERY

## 2018-12-05 PROCEDURE — 86901 BLOOD TYPING SEROLOGIC RH(D): CPT

## 2018-12-05 PROCEDURE — 82962 GLUCOSE BLOOD TEST: CPT

## 2018-12-05 PROCEDURE — 97110 THERAPEUTIC EXERCISES: CPT

## 2018-12-05 PROCEDURE — 77030006720 HC BLD PAT RMR ZIMM -B: Performed by: ORTHOPAEDIC SURGERY

## 2018-12-05 PROCEDURE — 76210000006 HC OR PH I REC 0.5 TO 1 HR: Performed by: ORTHOPAEDIC SURGERY

## 2018-12-05 PROCEDURE — 77030018836 HC SOL IRR NACL ICUM -A: Performed by: ORTHOPAEDIC SURGERY

## 2018-12-05 PROCEDURE — 74011000302 HC RX REV CODE- 302: Performed by: PHYSICIAN ASSISTANT

## 2018-12-05 PROCEDURE — 77030010509 HC AIRWY LMA MSK TELE -A: Performed by: ANESTHESIOLOGY

## 2018-12-05 PROCEDURE — 77030002912 HC SUT ETHBND J&J -A: Performed by: ORTHOPAEDIC SURGERY

## 2018-12-05 PROCEDURE — 77030002966 HC SUT PDS J&J -A: Performed by: ORTHOPAEDIC SURGERY

## 2018-12-05 PROCEDURE — 77030019557 HC ELECTRD VES SEAL MEDT -F: Performed by: ORTHOPAEDIC SURGERY

## 2018-12-05 PROCEDURE — 76060000034 HC ANESTHESIA 1.5 TO 2 HR: Performed by: ORTHOPAEDIC SURGERY

## 2018-12-05 PROCEDURE — 76010010054 HC POST OP PAIN BLOCK: Performed by: ORTHOPAEDIC SURGERY

## 2018-12-05 PROCEDURE — 77030012935 HC DRSG AQUACEL BMS -B: Performed by: ORTHOPAEDIC SURGERY

## 2018-12-05 PROCEDURE — 77030035236 HC SUT PDS STRATFX BARB J&J -B: Performed by: ORTHOPAEDIC SURGERY

## 2018-12-05 PROCEDURE — C1776 JOINT DEVICE (IMPLANTABLE): HCPCS | Performed by: ORTHOPAEDIC SURGERY

## 2018-12-05 PROCEDURE — 77030037364 HC TIB INST CR  DISP STRY -C: Performed by: ORTHOPAEDIC SURGERY

## 2018-12-05 PROCEDURE — 74011000250 HC RX REV CODE- 250

## 2018-12-05 DEVICE — IMPLANTABLE DEVICE: Type: IMPLANTABLE DEVICE | Site: KNEE | Status: FUNCTIONAL

## 2018-12-05 DEVICE — PAT ASYM MTL-BK 11MM SZ A38 -- TRIATHLON: Type: IMPLANTABLE DEVICE | Site: KNEE | Status: FUNCTIONAL

## 2018-12-05 DEVICE — COMPNT FEM CR TRIATHLN 7 L PA -- MOR-KNEE: Type: IMPLANTABLE DEVICE | Site: KNEE | Status: FUNCTIONAL

## 2018-12-05 DEVICE — BASEPLT TIB PC TRITNM SZ 7 -- TRIATHLON: Type: IMPLANTABLE DEVICE | Site: KNEE | Status: FUNCTIONAL

## 2018-12-05 RX ORDER — DIPHENHYDRAMINE HCL 25 MG
25 CAPSULE ORAL
Status: DISCONTINUED | OUTPATIENT
Start: 2018-12-05 | End: 2018-12-06 | Stop reason: HOSPADM

## 2018-12-05 RX ORDER — CHLORPHENIRAMINE MALEATE 4 MG
4 TABLET ORAL
COMMUNITY

## 2018-12-05 RX ORDER — ACETAMINOPHEN 500 MG
1000 TABLET ORAL EVERY 6 HOURS
Status: DISCONTINUED | OUTPATIENT
Start: 2018-12-06 | End: 2018-12-06 | Stop reason: HOSPADM

## 2018-12-05 RX ORDER — ASPIRIN 81 MG/1
81 TABLET ORAL EVERY 12 HOURS
Qty: 70 TAB | Refills: 0 | Status: SHIPPED | OUTPATIENT
Start: 2018-12-05 | End: 2019-01-09

## 2018-12-05 RX ORDER — SODIUM CHLORIDE 0.9 % (FLUSH) 0.9 %
5-10 SYRINGE (ML) INJECTION EVERY 8 HOURS
Status: DISCONTINUED | OUTPATIENT
Start: 2018-12-05 | End: 2018-12-06 | Stop reason: HOSPADM

## 2018-12-05 RX ORDER — KETOROLAC TROMETHAMINE 30 MG/ML
INJECTION, SOLUTION INTRAMUSCULAR; INTRAVENOUS AS NEEDED
Status: DISCONTINUED | OUTPATIENT
Start: 2018-12-05 | End: 2018-12-05 | Stop reason: HOSPADM

## 2018-12-05 RX ORDER — NALOXONE HYDROCHLORIDE 0.4 MG/ML
0.1 INJECTION, SOLUTION INTRAMUSCULAR; INTRAVENOUS; SUBCUTANEOUS AS NEEDED
Status: DISCONTINUED | OUTPATIENT
Start: 2018-12-05 | End: 2018-12-05 | Stop reason: HOSPADM

## 2018-12-05 RX ORDER — ASPIRIN 81 MG/1
81 TABLET ORAL EVERY 12 HOURS
Status: DISCONTINUED | OUTPATIENT
Start: 2018-12-05 | End: 2018-12-06 | Stop reason: HOSPADM

## 2018-12-05 RX ORDER — SODIUM CHLORIDE 0.9 % (FLUSH) 0.9 %
5-10 SYRINGE (ML) INJECTION AS NEEDED
Status: DISCONTINUED | OUTPATIENT
Start: 2018-12-05 | End: 2018-12-06 | Stop reason: HOSPADM

## 2018-12-05 RX ORDER — CEFAZOLIN SODIUM/WATER 2 G/20 ML
2 SYRINGE (ML) INTRAVENOUS ONCE
Status: COMPLETED | OUTPATIENT
Start: 2018-12-05 | End: 2018-12-05

## 2018-12-05 RX ORDER — SODIUM CHLORIDE 0.9 % (FLUSH) 0.9 %
5-10 SYRINGE (ML) INJECTION AS NEEDED
Status: DISCONTINUED | OUTPATIENT
Start: 2018-12-05 | End: 2018-12-05 | Stop reason: HOSPADM

## 2018-12-05 RX ORDER — ONDANSETRON 2 MG/ML
4 INJECTION INTRAMUSCULAR; INTRAVENOUS
Status: DISCONTINUED | OUTPATIENT
Start: 2018-12-05 | End: 2018-12-06 | Stop reason: HOSPADM

## 2018-12-05 RX ORDER — CEFAZOLIN SODIUM/WATER 2 G/20 ML
2 SYRINGE (ML) INTRAVENOUS EVERY 8 HOURS
Status: COMPLETED | OUTPATIENT
Start: 2018-12-05 | End: 2018-12-05

## 2018-12-05 RX ORDER — ROPIVACAINE HYDROCHLORIDE 2 MG/ML
INJECTION, SOLUTION EPIDURAL; INFILTRATION; PERINEURAL
Status: COMPLETED | OUTPATIENT
Start: 2018-12-05 | End: 2018-12-05

## 2018-12-05 RX ORDER — SODIUM CHLORIDE 9 MG/ML
100 INJECTION, SOLUTION INTRAVENOUS CONTINUOUS
Status: DISCONTINUED | OUTPATIENT
Start: 2018-12-05 | End: 2018-12-06 | Stop reason: HOSPADM

## 2018-12-05 RX ORDER — TRANEXAMIC ACID 100 MG/ML
INJECTION, SOLUTION INTRAVENOUS AS NEEDED
Status: DISCONTINUED | OUTPATIENT
Start: 2018-12-05 | End: 2018-12-05 | Stop reason: HOSPADM

## 2018-12-05 RX ORDER — NALOXONE HYDROCHLORIDE 0.4 MG/ML
.2-.4 INJECTION, SOLUTION INTRAMUSCULAR; INTRAVENOUS; SUBCUTANEOUS
Status: DISCONTINUED | OUTPATIENT
Start: 2018-12-05 | End: 2018-12-06 | Stop reason: HOSPADM

## 2018-12-05 RX ORDER — FAMOTIDINE 20 MG/1
20 TABLET, FILM COATED ORAL ONCE
Status: COMPLETED | OUTPATIENT
Start: 2018-12-05 | End: 2018-12-05

## 2018-12-05 RX ORDER — HYDROMORPHONE HYDROCHLORIDE 2 MG/ML
1 INJECTION, SOLUTION INTRAMUSCULAR; INTRAVENOUS; SUBCUTANEOUS
Status: DISCONTINUED | OUTPATIENT
Start: 2018-12-05 | End: 2018-12-06 | Stop reason: HOSPADM

## 2018-12-05 RX ORDER — LIDOCAINE HYDROCHLORIDE 10 MG/ML
0.1 INJECTION INFILTRATION; PERINEURAL AS NEEDED
Status: DISCONTINUED | OUTPATIENT
Start: 2018-12-05 | End: 2018-12-05 | Stop reason: HOSPADM

## 2018-12-05 RX ORDER — MIDAZOLAM HYDROCHLORIDE 1 MG/ML
INJECTION, SOLUTION INTRAMUSCULAR; INTRAVENOUS AS NEEDED
Status: DISCONTINUED | OUTPATIENT
Start: 2018-12-05 | End: 2018-12-05 | Stop reason: HOSPADM

## 2018-12-05 RX ORDER — OXYCODONE HYDROCHLORIDE 5 MG/1
5 TABLET ORAL
Status: DISCONTINUED | OUTPATIENT
Start: 2018-12-05 | End: 2018-12-05 | Stop reason: HOSPADM

## 2018-12-05 RX ORDER — HYDROMORPHONE HYDROCHLORIDE 2 MG/1
2 TABLET ORAL
Qty: 40 TAB | Refills: 0 | Status: SHIPPED | OUTPATIENT
Start: 2018-12-05 | End: 2019-02-25

## 2018-12-05 RX ORDER — EPHEDRINE SULFATE 50 MG/ML
INJECTION, SOLUTION INTRAVENOUS AS NEEDED
Status: DISCONTINUED | OUTPATIENT
Start: 2018-12-05 | End: 2018-12-05 | Stop reason: HOSPADM

## 2018-12-05 RX ORDER — NITROGLYCERIN 0.4 MG/1
0.4 TABLET SUBLINGUAL
Status: DISCONTINUED | OUTPATIENT
Start: 2018-12-05 | End: 2018-12-06 | Stop reason: HOSPADM

## 2018-12-05 RX ORDER — NEOMYCIN AND POLYMYXIN B SULFATES 40; 200000 MG/ML; [USP'U]/ML
SOLUTION IRRIGATION AS NEEDED
Status: DISCONTINUED | OUTPATIENT
Start: 2018-12-05 | End: 2018-12-05 | Stop reason: HOSPADM

## 2018-12-05 RX ORDER — PROPOFOL 10 MG/ML
INJECTION, EMULSION INTRAVENOUS AS NEEDED
Status: DISCONTINUED | OUTPATIENT
Start: 2018-12-05 | End: 2018-12-05 | Stop reason: HOSPADM

## 2018-12-05 RX ORDER — ROPIVACAINE HYDROCHLORIDE 2 MG/ML
INJECTION, SOLUTION EPIDURAL; INFILTRATION; PERINEURAL AS NEEDED
Status: DISCONTINUED | OUTPATIENT
Start: 2018-12-05 | End: 2018-12-05 | Stop reason: HOSPADM

## 2018-12-05 RX ORDER — FENTANYL CITRATE 50 UG/ML
100 INJECTION, SOLUTION INTRAMUSCULAR; INTRAVENOUS ONCE
Status: COMPLETED | OUTPATIENT
Start: 2018-12-05 | End: 2018-12-05

## 2018-12-05 RX ORDER — MIDAZOLAM HYDROCHLORIDE 1 MG/ML
2 INJECTION, SOLUTION INTRAMUSCULAR; INTRAVENOUS
Status: COMPLETED | OUTPATIENT
Start: 2018-12-05 | End: 2018-12-05

## 2018-12-05 RX ORDER — LANOLIN ALCOHOL/MO/W.PET/CERES
200 CREAM (GRAM) TOPICAL DAILY
Status: DISCONTINUED | OUTPATIENT
Start: 2018-12-06 | End: 2018-12-06 | Stop reason: HOSPADM

## 2018-12-05 RX ORDER — ACETAMINOPHEN 10 MG/ML
1000 INJECTION, SOLUTION INTRAVENOUS ONCE
Status: COMPLETED | OUTPATIENT
Start: 2018-12-05 | End: 2018-12-05

## 2018-12-05 RX ORDER — HYDROCODONE BITARTRATE AND ACETAMINOPHEN 7.5; 325 MG/1; MG/1
1 TABLET ORAL AS NEEDED
Status: DISCONTINUED | OUTPATIENT
Start: 2018-12-05 | End: 2018-12-05 | Stop reason: HOSPADM

## 2018-12-05 RX ORDER — SODIUM CHLORIDE, SODIUM LACTATE, POTASSIUM CHLORIDE, CALCIUM CHLORIDE 600; 310; 30; 20 MG/100ML; MG/100ML; MG/100ML; MG/100ML
100 INJECTION, SOLUTION INTRAVENOUS CONTINUOUS
Status: DISCONTINUED | OUTPATIENT
Start: 2018-12-05 | End: 2018-12-05 | Stop reason: HOSPADM

## 2018-12-05 RX ORDER — DEXAMETHASONE SODIUM PHOSPHATE 100 MG/10ML
10 INJECTION INTRAMUSCULAR; INTRAVENOUS ONCE
Status: DISCONTINUED | OUTPATIENT
Start: 2018-12-06 | End: 2018-12-06 | Stop reason: HOSPADM

## 2018-12-05 RX ORDER — AMOXICILLIN 250 MG
2 CAPSULE ORAL DAILY
Status: DISCONTINUED | OUTPATIENT
Start: 2018-12-06 | End: 2018-12-06 | Stop reason: HOSPADM

## 2018-12-05 RX ORDER — AMLODIPINE BESYLATE 5 MG/1
5 TABLET ORAL
Status: DISCONTINUED | OUTPATIENT
Start: 2018-12-05 | End: 2018-12-06 | Stop reason: HOSPADM

## 2018-12-05 RX ORDER — BUPIVACAINE HYDROCHLORIDE 7.5 MG/ML
INJECTION, SOLUTION INTRASPINAL
Status: DISCONTINUED | OUTPATIENT
Start: 2018-12-05 | End: 2018-12-05 | Stop reason: HOSPADM

## 2018-12-05 RX ORDER — CELECOXIB 200 MG/1
200 CAPSULE ORAL EVERY 12 HOURS
Status: DISCONTINUED | OUTPATIENT
Start: 2018-12-05 | End: 2018-12-06 | Stop reason: HOSPADM

## 2018-12-05 RX ORDER — HYDROMORPHONE HYDROCHLORIDE 2 MG/ML
0.5 INJECTION, SOLUTION INTRAMUSCULAR; INTRAVENOUS; SUBCUTANEOUS
Status: DISCONTINUED | OUTPATIENT
Start: 2018-12-05 | End: 2018-12-05 | Stop reason: HOSPADM

## 2018-12-05 RX ORDER — HYDROMORPHONE HYDROCHLORIDE 2 MG/1
2 TABLET ORAL
Status: DISCONTINUED | OUTPATIENT
Start: 2018-12-05 | End: 2018-12-06 | Stop reason: HOSPADM

## 2018-12-05 RX ORDER — SODIUM CHLORIDE 0.9 % (FLUSH) 0.9 %
5-10 SYRINGE (ML) INJECTION EVERY 8 HOURS
Status: DISCONTINUED | OUTPATIENT
Start: 2018-12-05 | End: 2018-12-05 | Stop reason: HOSPADM

## 2018-12-05 RX ORDER — PROPOFOL 10 MG/ML
INJECTION, EMULSION INTRAVENOUS
Status: DISCONTINUED | OUTPATIENT
Start: 2018-12-05 | End: 2018-12-05 | Stop reason: HOSPADM

## 2018-12-05 RX ORDER — SODIUM CHLORIDE 9 MG/ML
25 INJECTION, SOLUTION INTRAVENOUS CONTINUOUS
Status: DISCONTINUED | OUTPATIENT
Start: 2018-12-05 | End: 2018-12-05 | Stop reason: HOSPADM

## 2018-12-05 RX ADMIN — ACETAMINOPHEN 1000 MG: 10 INJECTION, SOLUTION INTRAVENOUS at 17:59

## 2018-12-05 RX ADMIN — Medication 1 AMPULE: at 11:10

## 2018-12-05 RX ADMIN — Medication 1 AMPULE: at 21:17

## 2018-12-05 RX ADMIN — SODIUM CHLORIDE, SODIUM LACTATE, POTASSIUM CHLORIDE, AND CALCIUM CHLORIDE 100 ML/HR: 600; 310; 30; 20 INJECTION, SOLUTION INTRAVENOUS at 05:53

## 2018-12-05 RX ADMIN — Medication 2 G: at 07:28

## 2018-12-05 RX ADMIN — FAMOTIDINE 20 MG: 20 TABLET, FILM COATED ORAL at 05:53

## 2018-12-05 RX ADMIN — TUBERCULIN PURIFIED PROTEIN DERIVATIVE 5 UNITS: 5 INJECTION, SOLUTION INTRADERMAL at 05:54

## 2018-12-05 RX ADMIN — SODIUM CHLORIDE, SODIUM LACTATE, POTASSIUM CHLORIDE, AND CALCIUM CHLORIDE: 600; 310; 30; 20 INJECTION, SOLUTION INTRAVENOUS at 07:25

## 2018-12-05 RX ADMIN — Medication 2 G: at 21:16

## 2018-12-05 RX ADMIN — Medication 5 ML: at 14:22

## 2018-12-05 RX ADMIN — ONDANSETRON 4 MG: 2 INJECTION INTRAMUSCULAR; INTRAVENOUS at 11:09

## 2018-12-05 RX ADMIN — HYDROMORPHONE HYDROCHLORIDE 2 MG: 2 TABLET ORAL at 22:05

## 2018-12-05 RX ADMIN — SODIUM CHLORIDE, SODIUM LACTATE, POTASSIUM CHLORIDE, AND CALCIUM CHLORIDE: 600; 310; 30; 20 INJECTION, SOLUTION INTRAVENOUS at 08:17

## 2018-12-05 RX ADMIN — MIDAZOLAM HYDROCHLORIDE 1 MG: 1 INJECTION, SOLUTION INTRAMUSCULAR; INTRAVENOUS at 07:34

## 2018-12-05 RX ADMIN — MIDAZOLAM 2 MG: 1 INJECTION INTRAMUSCULAR; INTRAVENOUS at 06:59

## 2018-12-05 RX ADMIN — ROPIVACAINE HYDROCHLORIDE 20 MG: 2 INJECTION, SOLUTION EPIDURAL; INFILTRATION; PERINEURAL at 07:01

## 2018-12-05 RX ADMIN — HYDROMORPHONE HYDROCHLORIDE 2 MG: 2 TABLET ORAL at 18:00

## 2018-12-05 RX ADMIN — BUPIVACAINE HYDROCHLORIDE 12 MG: 7.5 INJECTION, SOLUTION INTRASPINAL at 07:39

## 2018-12-05 RX ADMIN — CELECOXIB 200 MG: 200 CAPSULE ORAL at 21:17

## 2018-12-05 RX ADMIN — Medication 2 G: at 14:21

## 2018-12-05 RX ADMIN — TRANEXAMIC ACID 1000 MG: 100 INJECTION, SOLUTION INTRAVENOUS at 07:37

## 2018-12-05 RX ADMIN — HYDROMORPHONE HYDROCHLORIDE 1 MG: 2 INJECTION, SOLUTION INTRAMUSCULAR; INTRAVENOUS; SUBCUTANEOUS at 11:09

## 2018-12-05 RX ADMIN — SODIUM CHLORIDE 100 ML/HR: 900 INJECTION, SOLUTION INTRAVENOUS at 11:00

## 2018-12-05 RX ADMIN — PROPOFOL 100 MG: 10 INJECTION, EMULSION INTRAVENOUS at 08:06

## 2018-12-05 RX ADMIN — Medication 3 AMPULE: at 05:53

## 2018-12-05 RX ADMIN — EPHEDRINE SULFATE 10 MG: 50 INJECTION, SOLUTION INTRAVENOUS at 08:57

## 2018-12-05 RX ADMIN — EPHEDRINE SULFATE 10 MG: 50 INJECTION, SOLUTION INTRAVENOUS at 08:27

## 2018-12-05 RX ADMIN — FENTANYL CITRATE 100 MCG: 50 INJECTION INTRAMUSCULAR; INTRAVENOUS at 06:59

## 2018-12-05 RX ADMIN — PROPOFOL 50 MCG/KG/MIN: 10 INJECTION, EMULSION INTRAVENOUS at 07:42

## 2018-12-05 RX ADMIN — ASPIRIN 81 MG: 81 TABLET, COATED ORAL at 21:18

## 2018-12-05 RX ADMIN — EPHEDRINE SULFATE 5 MG: 50 INJECTION, SOLUTION INTRAVENOUS at 08:37

## 2018-12-05 NOTE — CONSULTS
HOSPITALIST H&P/CONSULT  NAME:  Shanika Liu   Age:  77 y.o.  :   1950   MRN:   [id number]  PCP: Renu Calderon MD  Consulting MD:  Treatment Team: Attending Provider: Blayne Odom MD; Consulting Provider: Claire David MD; Consulting Provider: Felicity Mares MD; Consulting Provider: Hafsa Chambers MD; Care Manager: Gaetano Jesus; Utilization Review: Marysol York  HPI:   Pt is a 77 y.o. male with CAD, HTN who underwent elective L TKA on 2018 with Dr. Elizabeth Castellano. No immediate complications. Pt reports he is doing well post op without pain. H/o CAD with stent . No CP, SOB. Intermittently takes Norvasc at home for elevated BP, usually 1-2x/week. He feels well and is without complaints. Complete ROS done and is as stated in HPI or otherwise negative  Past Medical History:   Diagnosis Date    Arthritis     CAD (coronary artery disease)     Chest pain, unspecified 2012    Environmental and seasonal allergies     H/O echocardiogram 2018    EF 55-60%    Hyperlipidemia     Hypertension 2012    Mitral valve regurgitation     minimal     NSTEMI (non-ST elevated myocardial infarction) ([facility] [address] 2012    3.5X23 Xience MLAD    Post PTCA     Status post left knee replacement 2018    Varicose veins of both lower extremities 2016    Varicose veins of legs       Past Surgical History:   Procedure Laterality Date    CARDIAC SURG PROCEDURE UNLIST      PCI x 1    HX COLONOSCOPY      HX KNEE ARTHROSCOPY Left     HX LUMBAR DISKECTOMY      HX OTHER SURGICAL  2003    Cantrell's Neuroma L foot    HX OTHER SURGICAL      basal cell cancer removed x2      Prior to Admission Medications   Prescriptions Last Dose Informant Patient Reported? Taking? Cetirizine (ZYRTEC) 10 mg cap 2018 at Unknown time  Yes Yes   Sig: Take  by mouth.    acetaminophen (TYLENOL) 325 mg tablet 2018 at Unknown time  Yes Yes   Sig: Take 325 mg by mouth every four (4) hours as needed for Pain. amLODIPine (NORVASC) 5 mg tablet 2018 at Unknown time  No Yes   Sig: Take 1 Tab by mouth as needed. Patient taking differently: Take 5 mg by mouth as needed. For BP > 150/90   aspirin 81 mg chewable tablet 2018 at Unknown time  Yes Yes   Sig: Take 1 Tab by mouth daily. b complex vitamins tablet 2018 at Unknown time  Yes Yes   Sig: Take 1 Tab by mouth daily. celecoxib (CELEBREX) 200 mg capsule 2018 at Unknown time  Yes Yes   Sig: Take 200 mg by mouth daily. chlorpheniramine (CHLOR-TRIMETON) 4 mg tablet 2018 at Unknown time  Yes Yes   Sig: Take 4 mg by mouth every six (6) hours as needed for Allergies. coenzyme Q-10 (CO Q-10) 200 mg capsule 2018 at Unknown time  Yes Yes   Sig: Take 400 mg by mouth daily. magnesium 250 mg tab 2018 at Unknown time  Yes Yes   Sig: Take  by mouth. nitroglycerin (NITROSTAT) 0.4 mg SL tablet Unknown at Unknown time  No No   Si Tab by SubLINGual route every five (5) minutes as needed for Chest Pain. Patient taking differently: 0.4 mg by SubLINGual route every five (5) minutes as needed for Chest Pain. Never have had to use   vit C/vit E/lutein/min/omega-3 (OCUVITE PO) 2018 at Unknown time  Yes Yes   Sig: Take  by mouth daily.       Facility-Administered Medications: None     Allergies   Allergen Reactions    Lisinopril Cough    Losartan Unknown (comments)     Patient unsure of reaction     Statins-Hmg-Coa Reductase Inhibitors Myalgia    Tape [Adhesive] Rash     and bandaids       Social History     Tobacco Use    Smoking status: Former Smoker     Last attempt to quit: 10/5/1981     Years since quittin.1    Smokeless tobacco: Never Used   Substance Use Topics    Alcohol use: Yes     Comment: rare       Family History   Problem Relation Age of Onset    Heart Disease Sister     Coronary Artery Disease Father     Diabetes Father     No Known Problems Mother Objective:     Patient Vitals for the past 24 hrs:   Temp Pulse Resp BP SpO2   12/05/18 1401     95 %   12/05/18 1041 97.4 °F (36.3 °C) 83 16 135/77 97 %   12/05/18 1025  74 16 137/75 98 %   12/05/18 1010  77 16 129/68 100 %   12/05/18 0955  81 16 121/74 100 %   12/05/18 0940  76 14 119/71 100 %   12/05/18 0935  77 14 118/65 100 %   12/05/18 0930  81 14 119/65 98 %   12/05/18 0927 98.1 °F (36.7 °C) 78 20 115/65 97 %   12/05/18 0725  75 16 137/79 95 %   12/05/18 0717  71 16 137/76 97 %   12/05/18 0712  69 16 129/77 97 %   12/05/18 0707  69 16 127/79 97 %   12/05/18 0702  70 16 152/88 98 %   12/05/18 0659  70 18 (!) 157/93 97 %   12/05/18 0644  71 18 154/87 98 %   12/05/18 0540 95.7 °F (35.4 °C) 85 16 (!) 154/98 99 %     Oxygen Therapy  O2 Sat (%): 95 % (12/05/18 1401)  Pulse via Oximetry: 84 beats per minute (12/05/18 1401)  O2 Device: Room air (12/05/18 1401)  O2 Flow Rate (L/min): 0 l/min (12/05/18 1401)  Physical Exam:  General:    Alert, cooperative, no distress, appears stated age. Head:   Normocephalic, without obvious abnormality, atraumatic. Nose:  Nares normal. No drainage or sinus tenderness. Lungs:   Clear to auscultation bilaterally. No Wheezing or Rhonchi. No rales. Heart:   Regular rate and rhythm,  no murmur, rub or gallop. Abdomen:   Soft, non-tender. Not distended. Bowel sounds normal.   Extremities: No cyanosis. No edema. No clubbing  Skin:     Texture, turgor normal. No rashes or lesions.   Not Jaundiced  Neurologic: Alert and oriented x 3, no focal deficits   Data Review:   Recent Results (from the past 24 hour(s))   TYPE & SCREEN    Collection Time: 12/05/18  6:12 AM   Result Value Ref Range    Crossmatch Expiration 12/08/2018     ABO/Rh(D) Eulice Simmons POSITIVE     Antibody screen NEG    SODIUM    Collection Time: 12/05/18  6:12 AM   Result Value Ref Range    Sodium 137 136 - 145 mmol/L   GLUCOSE, POC    Collection Time: 12/05/18  6:22 AM   Result Value Ref Range    Glucose (POC) 116 (H) 65 - 100 mg/dL     Imaging /Procedures /Studies     Assessment and Plan: Active Hospital Problems    Diagnosis Date Noted    Osteoarthritis 12/05/2018    Osteoarthritis of left knee 12/05/2018    Status post left knee replacement 12/05/2018       L knee OA  S/p L TKA with Dr. Angela Schaeffer on 12/5/2018. No immediate complications. - pain, prophylaxis, mobility per ortho    CAD  - cont ASA  - not on statin, BB as outpt    HTN  BP well controlled currently. Takes Norvasc PRN at home. - monitor    Thank you for this consult. All medical issues currently stable. We will sign off at this time. Please do not hesitate to call back for questions/concerns.     Signed By: Bradley Restrepo MD     December 5, 2018

## 2018-12-05 NOTE — PROGRESS NOTES
12/05/18 1401 Oxygen Therapy O2 Sat (%) 95 % Pulse via Oximetry 84 beats per minute O2 Device Room air O2 Flow Rate (L/min) 0 l/min Incentive Spirometry Treatment Actual Volume (ml) 3000 ml Number of Attempts 1 Patient achieved 3000   Ml/sec on IS. Patient encouraged to do every hour while awake-patient agreed and demonstrated. No shortness of breath or distress noted. BS are clear b/l.   
[facility] notes reviewed- continuous sat # 12 ordered HS

## 2018-12-05 NOTE — PERIOP NOTES
Teach back method used with patient concerning hibiclens wash, TB screening, incentive spirometer, pain management goals, and discharge needs list. Is goal 2000

## 2018-12-05 NOTE — ANESTHESIA PROCEDURE NOTES
Peripheral Block Start time: 12/5/2018 6:59 AM 
End time: 12/5/2018 7:01 AM 
Performed by: Tim Miles MD 
Authorized by: Tim Miles MD  
 
 
Pre-procedure: Indications: at surgeon's request and post-op pain management Preanesthetic Checklist: patient identified, risks and benefits discussed, site marked, timeout performed, anesthesia consent given and patient being monitored Timeout Time: 06:59 Block Type:  
Block Type: Adductor canal 
Laterality:  Left Monitoring:  Continuous pulse ox, frequent vital sign checks, heart rate, oxygen and responsive to questions Injection Technique:  Single shot Procedures: ultrasound guided and nerve stimulator Patient Position: supine Prep: DuraPrep Location:  Mid thigh Needle Type:  Stimuplex Needle Gauge:  20 G Needle Localization:  Nerve stimulator and ultrasound guidance Motor Response: minimal motor response >0.4 mA Assessment: 
Number of attempts:  1 Injection Assessment:  Incremental injection every 5 mL, local visualized surrounding nerve on ultrasound, negative aspiration for blood, no paresthesia, no intravascular symptoms and ultrasound image on chart Patient tolerance:  Patient tolerated the procedure well with no immediate complications

## 2018-12-05 NOTE — PROGRESS NOTES
Problem: Falls - Risk of 
Goal: *Absence of Falls Document Laura Rodney Fall Risk and appropriate interventions in the flowsheet. Outcome: Progressing Towards Goal 
Fall Risk Interventions: 
Mobility Interventions: Patient to call before getting OOB Medication Interventions: Patient to call before getting OOB Elimination Interventions: Patient to call for help with toileting needs

## 2018-12-05 NOTE — PROGRESS NOTES
Problem: Self Care Deficits Care Plan (Adult) Goal: *Acute Goals and Plan of Care (Insert Text) GOALS:  
DISCHARGE GOALS (in preparation for going home/rehab):  3 days 1. Mr. Reynaldo Sales will perform one lower body dressing activity with minimal assistance required to demonstrate improved functional mobility and safety. 2.  Mr. Reynaldo Sales will perform one lower body bathing activity with minimal assistance required to demonstrate improved functional mobility and safety. 3.  Mr. Reynaldo Sales will perform toileting/toilet transfer with contact guard assistance to demonstrate improved functional mobility and safety. 4.  Mr. Reynaldo Sales will perform shower transfer with contact guard assistance to demonstrate improved functional mobility and safety. [facility] OCCUPATIONAL THERAPY TKA: Initial Assessment 12/5/2018INPATIENT: Hospital Day: 1 [place] / Product Type: Medicare /  
  
NAME/AGE/GENDER: Carlos Valdez is a 77 y.o. male PRIMARY DIAGNOSIS:  Unilateral primary osteoarthritis, left knee [M17.12] Procedure(s) and Anesthesia Type: 
   * KNEE ARTHROPLASTY TOTAL LEFT/ - General (Left) ICD-10: Treatment Diagnosis:  
 · Pain in Left Knee (M25.562) · Stiffness of Left Knee, Not elsewhere classified (M25.662) ASSESSMENT:  
Mr. Reynaldo Sales is s/p L TKA and presents with decreased weight bearing on L LE and decreased independence with functional mobility and activities of daily living as compared to baseline level of function and safety. Patient would benefit from skilled Occupational Therapy to maximize independence and safety with self-care task and functional mobility. Pt would also benefit from education on adaptive equipment and safety precautions in preparation for going home or for recommendations for post-hospital rehab program.  Patient plans for further rehab at home with home health services and good family support.   OT reviewed therapy schedule and plan of care with patient. Patient was able to transfer and preform self care skills as charted below. Patient instructed to call for assistance when needing to get up from the bed and all needs in reach. Patient verbalized understanding of call light. This section established at most recent assessment PROBLEM LIST (Impairments causing functional limitations): 1. Decreased Strength 2. Decreased ADL/Functional Activities 3. Decreased Transfer Abilities 4. Increased Pain 5. Increased Fatigue 6. Decreased Flexibility/Joint Mobility 7. Decreased Knowledge of Precautions INTERVENTIONS PLANNED: (Benefits and precautions of occupational therapy have been discussed with the patient.) 1. Activities of daily living training 2. Adaptive equipment training 3. Balance training 4. Clothing management 5. Donning&doffing training 6. Theraputic activity TREATMENT PLAN: Frequency/Duration: Follow patient qd to address above goals. Rehabilitation Potential For Stated Goals: Good RECOMMENDED REHABILITATION/EQUIPMENT: (at time of discharge pending progress): Continue Skilled Therapy and Home Health: Physical Therapy. OCCUPATIONAL PROFILE AND HISTORY:  
History of Present Injury/Illness (Reason for Referral): Pt presents this date s/p (L) TKA. Past Medical History/Comorbidities:  
Mr. Bryson Ojeda  has a past medical history of Arthritis, CAD (coronary artery disease), Chest pain, unspecified, Environmental and seasonal allergies, H/O echocardiogram, Hyperlipidemia, Hypertension, Mitral valve regurgitation, NSTEMI (non-ST elevated myocardial infarction) ([address] Post PTCA, Status post left knee replacement, Varicose veins of both lower extremities, and Varicose veins of legs. Mr. Bryson Ojeda  has a past surgical history that includes pr cardiac surg procedure unlist (2012); hx knee arthroscopy (Left, 2001); hx other surgical (2003); hx lumbar diskectomy (1988); hx other surgical; and hx colonoscopy. Social History/Living Environment:  
Patient Expects to be Discharged to[de-identified] Other (comment)(to OR) Prior Level of Function/Work/Activity: 
independent Number of Personal Factors/Comorbidities that affect the Plan of Care: Brief history (0):  LOW COMPLEXITY ASSESSMENT OF OCCUPATIONAL PERFORMANCE[de-identified]  
Most Recent Physical Functioning:  
Balance Sitting: Intact Standing: With support;Pull to stand Gross Assessment: Yes Gross Assessment AROM: Within functional limits(BUE) Strength: Within functional limits(BUE) Vision Acuity: Within Defined Limits Mental Status Neurologic State: Alert Orientation Level: Oriented X4 Cognition: Follows commands Perception: Appears intact Perseveration: No perseveration noted Safety/Judgement: Awareness of environment; Fall prevention Basic ADLs (From Assessment) Complex ADLs (From Assessment) Basic ADL Feeding: Setup Oral Facial Hygiene/Grooming: Setup Bathing: Moderate assistance Upper Body Dressing: Setup Lower Body Dressing: Maximum assistance Toileting: Maximum assistance Grooming/Bathing/Dressing Activities of Daily Living Cognitive Retraining Safety/Judgement: Awareness of environment; Fall prevention Functional Transfers Bathroom Mobility: Minimum assistance(x 2) Toilet Transfer : Minimum assistance;Assist x2 Shower Transfer: Minimum assistance;Assist x2 Bed/Mat Mobility Supine to Sit: Minimum assistance Sit to Stand: Minimum assistance;Assist x2 Bed to Chair: Minimum assistance;Assist x2 Physical Skills Involved: 
1. Balance 2. Strength 3. Activity Tolerance Cognitive Skills Affected (resulting in the inability to perform in a timely and safe manner): 1. none Psychosocial Skills Affected: 1. none Number of elements that affect the Plan of Care: 1-3:  LOW COMPLEXITY CLINICAL DECISION MAKING:  
[employer] AM-[employer]AC 6 Clicks Daily Activity Inpatient Short Form How much help from another person does the patient currently need. .. Total A Lot A Little None 1. Putting on and taking off regular lower body clothing? [] 1   [x] 2   [] 3   [] 4  
2. Bathing (including washing, rinsing, drying)? [] 1   [x] 2   [] 3   [] 4  
3. Toileting, which includes using toilet, bedpan or urinal?   [] 1   [x] 2   [] 3   [] 4  
4. Putting on and taking off regular upper body clothing? [] 1   [] 2   [] 3   [x] 4  
5. Taking care of personal grooming such as brushing teeth? [] 1   [] 2   [] 3   [x] 4  
6. Eating meals? [] 1   [] 2   [] 3   [x] 4  
© 2007, Trustees of [address] [place] [address] [address] [address], under license to [employer]. All rights reserved Score:  Initial: 18 Most Recent: X (Date: -- ) Interpretation of Tool:  Represents activities that are increasingly more difficult (i.e. Bed mobility, Transfers, Gait). Score 24 23 22-20 19-15 14-10 9-7 6 Modifier CH CI CJ CK CL CM CN   
 
? Self Care:  
  - CURRENT STATUS: CK - 40%-59% impaired, limited or restricted  - GOAL STATUS: CJ - 20%-39% impaired, limited or restricted  - D/C STATUS:  ---------------To be determined--------------- [place] / [place] / Product Type: Medicare /   
 
Medical Necessity:    
· Patient is expected to demonstrate progress in strength, balance, coordination and functional technique to increase independence with self care and functional mobility. Reason for Services/Other Comments: 
· Patient continues to require skilled intervention due to decrease self care and mobility. Use of outcome tool(s) and clinical judgement create a POC that gives a: LOW COMPLEXITY  
  
 
 
 
TREATMENT:  
(In addition to Assessment/Re-Assessment sessions the following treatments were rendered) Pre-treatment Symptoms/Complaints: Tolerated sitting in chair Pain: Initial:  
Pain Intensity 1: 0 0 Post Session:  0  
 
 Assessment/Reassessment only, no treatment provided today Treatment/Session Assessment:   
 Response to Treatment:  Tolerated well. Education: 
[] Home Exercises [x] Fall Precautions [] Hip Precautions [] Going Home Video [x] Knee/Hip Prosthesis Review [x] Walker Management/Safety [x] Adaptive Equipment as Needed Interdisciplinary Collaboration:  
o Physical Therapist 
o Occupational Therapist 
o Registered Nurse After treatment position/precautions:  
o Up in chair 
o Bed/Chair-wheels locked 
o Caregiver at bedside 
o Call light within reach 
o RN notified 
o LEs reclined Compliance with Program/Exercises: Compliant all of the time. Recommendations/Intent for next treatment session:  Treatment next visit will focus on increasing Mr. Garcias independence with bed mobility, transfers, self care, functional mobility, modalities for pain, and patient education. Total Treatment Duration: OT Patient Time In/Time Out Time In: 6612 Time Out: 1855 Arnaldo Paul OT

## 2018-12-05 NOTE — PROGRESS NOTES
SW met with patient who states that he does not have any DME needs. Referral to [facility] for [facility] PT as patient is outside of [facility] network and is okay with this [facility] agency as an alternative. No additional needs identified at this time. Krystyna Rascon, ELEW  [facility] [email or web address]

## 2018-12-05 NOTE — CONSULTS
Physical Medicine & Rehabilitation Note-consult    Patient: Mireille Vee MRN: [id number]  SSN: [id number]    YOB: 1950  Age: 77 y.o. Sex: male      Admit Date: 12/5/2018  Admitting Physician: Sony Plascencia MD    Medical Decision Making/Plan/Recommend:  Gait impairment s/p L TKA. Post op therapy to resume in am. Moving BLE major muscle groups well. Sensation intact. Continue PT, OT for active/assisted/passive left TKA ROM, strengthening, mobility, transfers, gait training. Will follow progress to facilitate safe home discharge.  PT planned. Chief Complaint : Gait dysfunction secondary to below. Admit Diagnosis: Unilateral primary osteoarthritis, left knee [M17.12]  left total knee arthroplasty 12/5/2018  Pain  DVT risk  Post op acute blood loss anemia  CAD/ PTCA  HTN  Acute Rehab Dx:  Gait impairment  Mobility and ambulation deficits  Self Care/ADL deficits    Medical Dx:  Past Medical History:   Diagnosis Date    Arthritis     CAD (coronary artery disease)     Chest pain, unspecified 8/31/2012    Environmental and seasonal allergies     H/O echocardiogram 02/01/2018    EF 55-60%    Hyperlipidemia     Hypertension 8/31/2012    Mitral valve regurgitation     minimal     NSTEMI (non-ST elevated myocardial infarction) ([facility] [address] 09/02/2012    3.5X23 Xience MLAD    Post PTCA     Status post left knee replacement 12/5/2018    Varicose veins of both lower extremities 7/6/2016    Varicose veins of legs      Subjective:     HPI: Mireille Vee is a 77 y.o. male patient at [address] [place] [address] who was admitted on 12/5/2018  by Sony Plascencia MD with below mentioned medical history, is being seen for Physical Medicine and Rehabilitation consult. Mireille Vee who presented with severe left knee pain  due to end stage DJD is s/p a left total knee arthroplasty per Dr. Sony Plascencia MD on 12/5/2018. Post op patient is to be WBAT LLE.  Acute PT and OT will start in am, to focus on  gait dysfunciton due to knee pain, decreased ROM and strength. We are consulted to assist with rehab needs and placement. Silvio Canas is seen and examined today. Medical Records reviewed. Patient states he is normally active and independent with all activities. Current Level of Function:  bed mobility - min A, transfers - requires assist, decreased balance , ambulation - requires assist.    Previous Functional Level- independent    Family History   Problem Relation Age of Onset    Heart Disease Sister     Coronary Artery Disease Father     Diabetes Father     No Known Problems Mother       Social History     Tobacco Use    Smoking status: Former Smoker     Last attempt to quit: 10/5/1981     Years since quittin.1    Smokeless tobacco: Never Used   Substance Use Topics    Alcohol use: Yes     Comment: rare      Past Surgical History:   Procedure Laterality Date    CARDIAC SURG PROCEDURE UNLIST      PCI x 1    HX COLONOSCOPY      HX KNEE ARTHROSCOPY Left     HX LUMBAR DISKECTOMY  1988    HX OTHER SURGICAL  2003    Cantrell's Neuroma L foot    HX OTHER SURGICAL      basal cell cancer removed x2      Prior to Admission medications    Medication Sig Start Date End Date Taking? Authorizing Provider   chlorpheniramine (CHLOR-TRIMETON) 4 mg tablet Take 4 mg by mouth every six (6) hours as needed for Allergies. Yes Provider, Historical   HYDROmorphone (DILAUDID) 2 mg tablet Take 1 Tab by mouth every four (4) hours as needed. Max Daily Amount: 12 mg. 18  Yes DONALD Mo   aspirin delayed-release 81 mg tablet Take 1 Tab by mouth every twelve (12) hours every twelve (12) hours for 35 days. 18 Yes DONALD Mo   celecoxib (CELEBREX) 200 mg capsule Take 200 mg by mouth daily. Yes Provider, Historical   vit C/vit E/lutein/min/omega-3 (OCUVITE PO) Take  by mouth daily.    Yes Provider, Historical   acetaminophen (TYLENOL) 325 mg tablet Take 325 mg by mouth every four (4) hours as needed for Pain. Yes Provider, Historical   b complex vitamins tablet Take 1 Tab by mouth daily. Yes Provider, Historical   Cetirizine (ZYRTEC) 10 mg cap Take  by mouth. Yes Provider, Historical   amLODIPine (NORVASC) 5 mg tablet Take 1 Tab by mouth as needed. Patient taking differently: Take 5 mg by mouth as needed. For BP > 150/90 18  Yes Xiomara Ayon MD   magnesium 250 mg tab Take  by mouth. Yes Provider, Historical   coenzyme Q-10 (CO Q-10) 200 mg capsule Take 400 mg by mouth daily. Yes Provider, Historical   aspirin 81 mg chewable tablet Take 1 Tab by mouth daily. 12  Yes Peyton Lovelace PA   nitroglycerin (NITROSTAT) 0.4 mg SL tablet 1 Tab by SubLINGual route every five (5) minutes as needed for Chest Pain. Patient taking differently: 0.4 mg by SubLINGual route every five (5) minutes as needed for Chest Pain. Never have had to use 18   Xiomara Ayon MD     Allergies   Allergen Reactions    Lisinopril Cough    Losartan Unknown (comments)     Patient unsure of reaction     Statins-Hmg-Coa Reductase Inhibitors Myalgia    Tape [Adhesive] Rash     and bandaids         Review of Systems: +left knee pain, +antalgic gait. Denies chest pain, shortness of breath, cough, headache, visual problems, abdominal pain, dysurea, calf pain. Pertinent positives are as noted in the medical records and unremarkable otherwise. Objective:     Vitals:  Blood pressure 134/66, pulse 91, temperature 97.4 °F (36.3 °C), resp. rate 16, height 6' 2\" (1.88 m), weight 213 lb (96.6 kg), SpO2 97 %. Temp (24hrs), Av.2 °F (36.2 °C), Min:95.7 °F (35.4 °C), Max:98.1 °F (36.7 °C)    BMI (calculated): 27.3 (18 0601)   Intake and Output:  No intake/output data recorded. Physical Exam:   General: Alert and age appropriately oriented. No acute cardio respiratory distress. HEENT: Normocephalic, no conjunctival pallor.   Oral mucosa moist without cyanosis. No JVD. Lungs: CTA anteriorly. No wheezing. Heart: Regular rate and rhythm, S1, S2   No  Murmurs. Abdomen: Soft, non-tender, non-distended. Genitourinary: defered   Neuromuscular:      Grossly no focal motor deficits. Left knee extension 5/5  Left ankle dorsiflexion 5/5  Left ankle plantarflexion 5/5  No sensory deficits distally BLE to soft touch. Skin/extremity: Non tender calves BLE. No rashes, no marginal erythema. Labs/Studies:  Recent Results (from the past 72 hour(s))   TYPE & SCREEN    Collection Time: 12/05/18  6:12 AM   Result Value Ref Range    Crossmatch Expiration 12/08/2018     ABO/Rh(D) Ashish Briggs POSITIVE     Antibody screen NEG    SODIUM    Collection Time: 12/05/18  6:12 AM   Result Value Ref Range    Sodium 137 136 - 145 mmol/L   GLUCOSE, POC    Collection Time: 12/05/18  6:22 AM   Result Value Ref Range    Glucose (POC) 116 (H) 65 - 100 mg/dL       Functional Assessment:  Reviewed participation and progress in therapies  Gross Assessment  AROM: Within functional limits(right LE) (12/05/18 1200)  Strength: Generally decreased, functional(right LE) (12/05/18 1200)   Bed Mobility  Supine to Sit: Minimum assistance (12/05/18 1200)   Balance  Sitting: Intact (12/05/18 1200)  Standing: Pull to stand; With support (12/05/18 1200)               Bed/Mat Mobility  Supine to Sit: Minimum assistance (12/05/18 1200)  Sit to Stand: Additional time;Assist x2;Minimum assistance (12/05/18 1200)  Bed to Chair: Assist x2; Additional time;Minimum assistance (12/05/18 1200)     Ambulation:  Gait  Speed/Muna: Delayed (12/05/18 1200)  Step Length: Right shortened;Left shortened (12/05/18 1200)  Stance: Left decreased (12/05/18 1200)  Gait Abnormalities: Antalgic;Decreased step clearance (12/05/18 1200)  Ambulation - Level of Assistance: Minimal assistance;Assist x2; Additional time (12/05/18 1200)  Distance (ft): 3 Feet (ft) (12/05/18 1200)  Assistive Device: Walker, rolling (12/05/18 1200)  Interventions: Safety awareness training;Verbal cues (12/05/18 1200)    Impression/Plan:     Principal Problem:    Status post left knee replacement (12/5/2018)    Active Problems:    Osteoarthritis (12/5/2018)      Osteoarthritis of left knee (12/5/2018)        Current Facility-Administered Medications   Medication Dose Route Frequency Provider Last Rate Last Dose    [START ON 12/6/2018] tuberculin injection 5 Units  5 Units IntraDERMal DONALD Lou   5 Units at 12/05/18 0554    amLODIPine (NORVASC) tablet 5 mg  5 mg Oral DAILY PRN Dial Stacy, PA        [START ON 12/6/2018] magnesium oxide (MAG-OX) tablet 200 mg  200 mg Oral DAILY Dial Fore, PA        nitroglycerin (NITROSTAT) tablet 0.4 mg  0.4 mg SubLINGual Q5MIN PRN Dial Stacy, PA        alcohol 62% (NOZIN) nasal  1 Ampule  1 Ampule Topical Q12H Dial Fore, PA   1 Ampule at 12/05/18 1110    0.9% sodium chloride infusion  100 mL/hr IntraVENous CONTINUOUS Dial Stacy,  mL/hr at 12/05/18 1100 100 mL/hr at 12/05/18 1100    sodium chloride (NS) flush 5-10 mL  5-10 mL IntraVENous Q8H Dial Fore, PA   5 mL at 12/05/18 1422    sodium chloride (NS) flush 5-10 mL  5-10 mL IntraVENous PRN Dial Stacy, PA        ceFAZolin (ANCEF) 2 g/20 mL in sterile water IV syringe  2 g IntraVENous Q8H Dial Fore, PA   2 g at 12/05/18 1421    [START ON 12/6/2018] acetaminophen (TYLENOL) tablet 1,000 mg  1,000 mg Oral Q6H Dial Fore, PA        celecoxib (CELEBREX) capsule 200 mg  200 mg Oral Q12H Dial Fore, PA        HYDROmorphone (DILAUDID) tablet 2 mg  2 mg Oral Q4H PRN Dial Fore, PA   2 mg at 12/05/18 1800    HYDROmorphone (PF) (DILAUDID) injection 1 mg  1 mg IntraVENous Q3H PRN Dial Stacy, PA   1 mg at 12/05/18 1109    naloxone (NARCAN) injection 0.2-0.4 mg  0.2-0.4 mg IntraVENous Q10MIN PRN DONALD Abebe        [START ON 12/6/2018] dexamethasone (DECADRON) injection 10 mg  10 mg IntraVENous Dee Kimi Martinez Layla Severino        ondansetron [facility]) injection 4 mg  4 mg IntraVENous Q4H PRN DONALD Abebe   4 mg at 12/05/18 1109    diphenhydrAMINE (BENADRYL) capsule 25 mg  25 mg Oral Q4H PRN DONALD Abebe        [START ON 12/6/2018] senna-docusate (PERICOLACE) 8.6-50 mg per tablet 2 Tab  2 Tab Oral DAILY Layla Abebe        aspirin delayed-release tablet 81 mg  81 mg Oral Q12H DONALD Abebe            Recommendations: plan for [facility] PT. Continue Acute Rehab Program. PT, OT  to focus on  gait training, transfer training, balance activities, ROM and strengthening exercises. Coordination of rehab/medical care. Counseling of Physical Medicine & Rehab care issues management. Monitoring and management of rehab conditions per the plan of care/orders. Rehabilitation Management/ Medical Management: 1. Devices:Walkers, Type: Rolling Walker  2. Consult:Rehab team including PT, OT,  and . 3. Disposition Rehab-discussed with patient. 4. Thigh-high or knee-high JENNIE's when out of bed. 5. DVT Prophylaxis - aspirin 81 mg bid x 35days. 6. Incentive spirometer Q1H while awake  7. Post op hemorrhagic anemia- monitor. Check in am.   8. Activity: WBAT LLE  9. Planned Labs: CBC,BMP  10. Pain Control:   scheduled tylenol, Celebrex and  PRN meds. 11. Wound Care: May remove Aquacel 1 week post op ad replace with new one. Remove staples 12-14 post surgery, when incision appears appropriately closed and apply benzoin and 1/2\" steristrips. Follow up with ORTHO per instructions. Thank you for the opportunity to participate in the care of this patient.     Signed By: Rajesh oJse MD

## 2018-12-05 NOTE — ANESTHESIA PROCEDURE NOTES
Spinal Block Start time: 12/5/2018 7:36 AM 
End time: 12/5/2018 7:39 AM 
Performed by: Raza Castrejon MD 
Authorized by: Raza Castrejon MD  
 
Pre-procedure: Indications: primary anesthetic  Preanesthetic Checklist: patient identified, risks and benefits discussed, anesthesia consent, site marked, patient being monitored and timeout performed Timeout Time: 07:36 Spinal Block:  
Patient Position:  Seated Prep Region:  Lumbar Prep: DuraPrep Location:  L3-4 Technique:  Single shot Local Dose (mL):  3 Needle:  
Needle Type:  Pencan Needle Gauge:  25 G Attempts:  1 Events: CSF confirmed, no blood with aspiration and no paresthesia Assessment: 
Insertion:  Uncomplicated Patient tolerance:  Patient tolerated the procedure well with no immediate complications

## 2018-12-05 NOTE — PERIOP NOTES
TRANSFER - IN REPORT: 
 
Verbal report received from [facility] on Beth-Bhupendra  being received from ortho for routine progression of care Report consisted of patients Situation, Background, Assessment and  
Recommendations(SBAR). Information from the following report(s) SBAR was reviewed with the receiving nurse. Opportunity for questions and clarification was provided. Assessment completed upon patients arrival to unit and care assumed.

## 2018-12-05 NOTE — PERIOP NOTES
TRANSFER - OUT REPORT: 
 
Verbal report given to Elbert Flanagan RN(name) on Jin Miller  being transferred to Ortho Unit Room 338(unit) for routine post - op Report consisted of patients Situation, Background, Assessment and  
Recommendations(SBAR). Information from the following report(s) SBAR, OR Summary, Intake/Output and Cardiac Rhythm NSR was reviewed with the receiving nurse. Opportunity for questions and clarification was provided. Patient transported with: 
 [employer]

## 2018-12-05 NOTE — ANESTHESIA POSTPROCEDURE EVALUATION
Procedure(s): 
KNEE ARTHROPLASTY TOTAL LEFT/. Anesthesia Post Evaluation Patient location during evaluation: PACU Patient participation: complete - patient participated Level of consciousness: awake and alert Pain management: adequate Airway patency: patent Anesthetic complications: no 
Cardiovascular status: acceptable Respiratory status: acceptable Hydration status: acceptable Visit Vitals /68 Pulse 77 Temp 36.7 °C (98.1 °F) Resp 16 Ht 6' 2\" (1.88 m) Wt 96.6 kg (213 lb) SpO2 100% BMI 27.35 kg/m²

## 2018-12-05 NOTE — PROGRESS NOTES
Problem: Mobility Impaired (Adult and Pediatric) Goal: *Acute Goals and Plan of Care (Insert Text) GOALS (1-4 days): 
(1.)Mr. Maira Oneil will move from supine to sit and sit to supine  in bed with STAND BY ASSIST. 
(2.)Mr. Maira Oneil will transfer from bed to chair and chair to bed with STAND BY ASSIST using the least restrictive device. (3.)Mr. Maira Oneil will ambulate with STAND BY ASSIST for 150 feet with the least restrictive device. (4.)Mr. Maira Oneil will ambulate up/down 3 steps with left railing with MINIMAL ASSIST with device as needed. (5.)Mr. Maira Oneil will increase left knee ROM to 5°-80°. 
________________________________________________________________________________________________ PHYSICAL THERAPY [facility] tKa: Initial Assessment, PM 12/5/2018INPATIENT: Hospital Day: 1 [place] / Product Type: Medicare /  
  
NAME/AGE/GENDER: Yrn Milton is a 77 y.o. male PRIMARY DIAGNOSIS:  Unilateral primary osteoarthritis, left knee [M17.12] Procedure(s) and Anesthesia Type: 
   * KNEE ARTHROPLASTY TOTAL LEFT/ - General (Left) ICD-10: Treatment Diagnosis:  
 · Pain in Left Knee (M25.562) · Stiffness of Left Knee, Not elsewhere classified (M25.662) · Difficulty in walking, Not elsewhere classified (R26.2) ASSESSMENT:  
Mr. Maira Oneil presents with decreased rom and strength of left LE as well as decreased functional mobility and gait s/p left tka. He plans to go home with HHPT. This section established at most recent assessment PROBLEM LIST (Impairments causing functional limitations): 1. Decreased Strength 2. Decreased ADL/Functional Activities 3. Decreased Transfer Abilities 4. Decreased Ambulation Ability/Technique 5. Decreased Balance 6. Increased Pain 7. Decreased Activity Tolerance 8. Decreased Flexibility/Joint Mobility 9.  Decreased [place] with Home Exercise Program 
 INTERVENTIONS PLANNED: (Benefits and precautions of physical therapy have been discussed with the patient.) 1. Bed Mobility 2. Gait Training 3. Home Exercise Program (HEP) 4. Therapeutic Exercise/Strengthening 5. Transfer Training 6. Range of Motion: active/assisted/passive 7. Therapeutic Activities 8. Group Therapy TREATMENT PLAN: Frequency/Duration: Follow patient BID for duration of hospital stay to address above goals. Rehabilitation Potential For Stated Goals: Good RECOMMENDED REHABILITATION/EQUIPMENT: (at time of discharge pending progress): Continue Skilled Therapy and Home Health: Physical Therapy. HISTORY:  
History of Present Injury/Illness (Reason for Referral): S/p left tka Past Medical History/Comorbidities:  
Mr. Reji Rivera  has a past medical history of Arthritis, CAD (coronary artery disease), Chest pain, unspecified, Environmental and seasonal allergies, H/O echocardiogram, Hyperlipidemia, Hypertension, Mitral valve regurgitation, NSTEMI (non-ST elevated myocardial infarction) ([facility] [address] Post PTCA, Status post left knee replacement, Varicose veins of both lower extremities, and Varicose veins of legs. Mr. Reji Rivera  has a past surgical history that includes pr cardiac surg procedure unlist (2012); hx knee arthroscopy (Left, 2001); hx other surgical (2003); hx lumbar diskectomy (1988); hx other surgical; and hx colonoscopy. Social History/Living Environment:  
Patient Expects to be Discharged to[de-identified] Other (comment)(to OR) Prior Level of Function/Work/Activity: 
independent Number of Personal Factors/Comorbidities that affect the Plan of Care: 1-2: MODERATE COMPLEXITY EXAMINATION:  
Most Recent Physical Functioning:  
Gross Assessment: Yes Gross Assessment AROM: Within functional limits(right LE) Strength: Generally decreased, functional(right LE) LLE AROM 
L Knee Flexion: 75 L Knee Extension: 10 Bed Mobility Supine to Sit: Minimum assistance Transfers Sit to Stand: Additional time;Assist x2;Minimum assistance Stand to Sit: Minimum assistance; Additional time;Assist x2 Bed to Chair: Assist x2; Additional time;Minimum assistance Balance Sitting: Intact Standing: Pull to stand; With support Weight Bearing Status Left Side Weight Bearing: As tolerated Distance (ft): 3 Feet (ft) Ambulation - Level of Assistance: Minimal assistance;Assist x2; Additional time Assistive Device: Walker, rolling Speed/Muna: Delayed Step Length: Right shortened;Left shortened Stance: Left decreased Gait Abnormalities: Antalgic;Decreased step clearance Interventions: Safety awareness training;Verbal cues Braces/Orthotics:  
 
Left Knee Cold Type: Cryocuff Body Structures Involved: 1. Bones 2. Joints 3. Muscles 4. Ligaments Body Functions Affected: 1. Movement Related Activities and Participation Affected: 1. Mobility Number of elements that affect the Plan of Care: 3: MODERATE COMPLEXITY CLINICAL PRESENTATION:  
Presentation: Stable and uncomplicated: LOW COMPLEXITY CLINICAL DECISION MAKIN[address] [place] [id number] AM-PAC 6 Clicks Basic Mobility Inpatient Short Form How much difficulty does the patient currently have. .. Unable A Lot A Little None 1. Turning over in bed (including adjusting bedclothes, sheets and blankets)? [] 1   [] 2   [x] 3   [] 4  
2. Sitting down on and standing up from a chair with arms ( e.g., wheelchair, bedside commode, etc.)   [] 1   [] 2   [x] 3   [] 4  
3. Moving from lying on back to sitting on the side of the bed? [] 1   [] 2   [x] 3   [] 4 How much help from another person does the patient currently need. .. Total A Lot A Little None 4. Moving to and from a bed to a chair (including a wheelchair)? [] 1   [] 2   [x] 3   [] 4  
5. Need to walk in hospital room? [] 1   [x] 2   [] 3   [] 4  
6. Climbing 3-5 steps with a railing? [] 1   [x] 2   [] 3   [] 4  
© , Trustees of [address] [place] [id number], under license to [employer]. All rights reserved Score:  Initial: 16 Most Recent: X (Date: -- ) Interpretation of Tool:  Represents activities that are increasingly more difficult (i.e. Bed mobility, Transfers, Gait). Score 24 23 22-20 19-15 14-10 9-7 6 Modifier CH CI CJ CK CL CM CN   
 
? Mobility - Walking and Moving Around:  
  - CURRENT STATUS: CK - 40%-59% impaired, limited or restricted  - GOAL STATUS: CJ - 20%-39% impaired, limited or restricted  - D/C STATUS:  ---------------To be determined--------------- [place] / [place] / Product Type: Medicare /   
 
Medical Necessity:    
· Patient is expected to demonstrate progress in strength, range of motion and balance to decrease assistance required with theraputic exercises and functional mobility. Reason for Services/Other Comments: 
· Patient continues to require present interventions due to patient's inability to perform theraputic exercises and functional mobility independently. Use of outcome tool(s) and clinical judgement create a POC that gives a: Clear prediction of patient's progress: LOW COMPLEXITY  
  
 
 
 
TREATMENT:  
(In addition to Assessment/Re-Assessment sessions the following treatments were rendered) Pre-treatment Symptoms/Complaints:  none Pain: Initial:  
   Post Session:  0 Therapeutic Exercise: (10 Minutes):  Exercises per grid below to improve mobility and strength. Required minimal visual, verbal and manual cues to promote proper body alignment, promote proper body posture and promote proper body mechanics. Progressed range and repetitions as indicated. Date: 
12/5 Date: 
 Date: 
  
ACTIVITY/EXERCISE AM PM AM PM AM PM  
GROUP THERAPY  []  []  []  []  []  [] Ankle Pumps  10a Quad Sets  10a Gluteal Sets  10a Hip ABd/ADduction  10a Straight Leg Raises Knee Slides  10a Short Arc The [address] Chair Slides B = bilateral; AA = active assistive; A = active; P = passive Treatment/Session Assessment:   
 Response to Treatment:  Pt. Did fine but LE's still numb some limiting gait. Education: 
[x] Home Exercises [x] Fall Precautions [] Hip Precautions [] D/C Instruction Review [x] Knee/Hip Prosthesis Review [x] Walker Management/Safety [] Adaptive Equipment as Needed Interdisciplinary Collaboration:  
o Occupational Therapist 
o Registered Nurse After treatment position/precautions:  
o Up in chair 
o Bed/Chair-wheels locked 
o Bed in low position 
o Caregiver at bedside 
o Call light within reach 
o Family at bedside Compliance with Program/Exercises: Will assess as treatment progresses. No questions. Recommendations/Intent for next treatment session:  Treatment next visit will focus on increasing Mr. Garcias independence with bed mobility, transfers, gait training, strength/ROM exercises, modalities for pain, and patient education. Total Treatment Duration: PT Patient Time In/Time Out Time In: 1115 Time Out: 1135 Miguel Dumont, PT

## 2018-12-05 NOTE — OP NOTES
[facility]  Cementless Total Knee Arthroplasty: Posterior Cruciate Retaining     Patient:Denis Tobin   : 1950  Medical Record CHYIF  Pre-operative Diagnosis:  Unilateral primary osteoarthritis, left knee [M17.12]  Post-operative Diagnosis: Osteoarthritis of left knee  Location: [address] [place] [address]  Surgeon: Anika Peña MD   Assistant: Ludmila Winn    Anesthesia: General and FNB    Procedure:Procedure(s) (LRB):  KNEE ARTHROPLASTY TOTAL LEFT/ (Left)   The complexity of the total joint surgery requires the use of a first assistant for positioning, retraction and expertise in closure. Tourniquet Time: 0 minutes  EBL: 250 cc  Findings: severe degenerative arthritis, patellar osteophytes, posterior femoral osteophytes   BMI: Body mass index is 27.35 kg/m²Nicholas Schuster was brought to the operating room and positioned on the operating table. He was anesthestized with anesthesia. A velez catheter was placed preoperatively and IV antibiotics was administered. Prior to the incision being made a timeout was called identifying the patient, procedure ,operative side and surgeon The operative leg was prepped and draped in the usual sterile manner. An anterior longitudinal incision was accomplished just medial to the tibial tubercle and extending approximal 6 centimeters proximal to the superior pole of the patella. A medial parapatellar capsular incision was performed. The medial capsular flap was elevated around to the insertion of the semimembranous tendon. The patella was everted and the knee flexed and externally rotated. The medial and external menisci were excised. The lateral half of the fat pad excised and the patella femoral ligament was released. The anterior cruciate ligament was resect and the posterior cruciate ligament was retained. Using extramedullary instrumentation, the tibial cut was accomplished with appropriate posterior slope.       The distal femur was addressed. A drill hole was made above the intracondylar notch. Using appropriate intramedullary instrumentation,a five degree valgus distal cut was accomplished. The femur was sized. The anterior and posterior cuts were then made about the distal femur. The osteophytes were removed from the tibial and femoral surfaces. The flexion and extension gaps were assessed with the appropriate spacer blocks. Additional surgical procedures included: none. The flexion and extension gaps were deemed appropriately balanced. The appropriate cutting blocks were then utilized to perform the anterior, posterior and chamfer cuts, with appropriate lateral translation accomplished for the patellofemoral groove. Approximately 9 mm of bone was removed from the high side of the tibia. The tibia was sized. .  The tibial base plate was pinned into place with the appropriate external rotation and stem site prepared. A preliminary range of motion was accomplished. The  Patient was found to obtain full extension as well as appropriate flexion. The patient's ligaments were stable in flexion and extension to medial and lateral stressing and the alignment was through the appropriate mechanical axis. The patella was then everted. The bone was resect to accommodate the three peg patella button. A trial reduction revealed appropriate tracking through the patellofemoral groove with no lateral retinacular release being accomplished. All trial components were removed. The real implants were opened: Sizes listed below. The knee was irrigated. There were no femoral deficiencies. There were no tibial deficiencies. No augmentation was utilized. The permanent cementless Tibial and Femoral components were impacted into place. The cementless  patella component was then pressed in place. [facility] knee was placed through range of motion and noted to be stable as mentioned above with the trail components. The wound was dry, therefore no drain was used. The operative knee was injected with 60 cc of Naropin, 10 cc's of morphine and 1 cc of 30 mg of Toradol. The knee was then soaked with a diluted betadine solution for approximately 3 min. This was then thoroughly irrigated. The capsular layer was closed using a #1 PDS suture. Then, 1 gram (100 mg/ml) of Transexamic Acid was injected into the joint space. The subcutaneous layers were closed using 2-0 Stratafix. Finally the skin was closed using 3-0 Vicryl and skin staples, which were applied in occlusive fashion and sterile bandage applied. An Iceman cryo pad was applied on the operative leg. Sponge count and needle counts were correct. Noam Bar left the operating room     Implants:   Implant Name Type Inv.  Item Serial No.  Lot No. LRB No. Used   COMPNT FEM CR TRIATHLN 7 L PA --  - [id number]  COMPNT FEM CR TRIATHLN 7 L PA --  [id number] [employer] HOW [id number] Left 1   BASEPLT TIB PC TRITNM SZ 7 -- TRIATHLON - [id number]  BASEPLT TIB PC TRITNM SZ 7 -- TRIATHLON [id number] [employer] HOW [id number] Left 1   PAT ASYM MTL-BK 11MM SZ A38 -- TRIATHLON - [id number]  PAT ASYM MTL-BK 11MM SZ A38 -- TRIATHLON E9DP [employer] HOW [id number] Left 1   INSERT TIB CR TRIATHLN 7 11MM --  - [id number]  INSERT TIB CR TRIATHLN 7 11MM --  [id number] [employer] HOW [id number] Left 1         Signed By: Juliet Nava MD   12/5/2018,  8:50 AM

## 2018-12-05 NOTE — H&P
[facility] Pre Operative History and Physical Exam 
 
Patient ID: 
Sarah Lopez [id number] 
77 y.o. 
1950 Today: December 5, 2018 Assessment: 1. Arthritis of the left knee Plan: 1. Proceed with scheduled Procedure(s) (LRB): 
KNEE ARTHROPLASTY TOTAL LEFT/ STYKER/ FNB (Left) CC: Left knee pain HPI:   The patient has end stage arthritis of the left knee. The patient was evaluated and examined during a consultation prior to this office visit. There have been no changes to the patient's orthopedic condition since the initial consultation. The patient has failed previous conservative treatment for this condition including antiinflammatories , and lifestyle modifications. The necessity for joint replacement is present. The patient will be admitted the day of surgery for Procedure(s) (LRB): 
KNEE ARTHROPLASTY TOTAL LEFT/ STYKER/ FNB (Left) Past Medical/Surgical History: 
Past Medical History:  
Diagnosis Date  Arthritis  CAD (coronary artery disease)  Chest pain, unspecified 8/31/2012  Environmental and seasonal allergies  H/O echocardiogram 02/01/2018 EF 55-60%  Hyperlipidemia  Hypertension 8/31/2012  Mitral valve regurgitation   
 minimal   
 NSTEMI (non-ST elevated myocardial infarction) ([facility] [address] 09/02/2012  
 3.5X23 Xience MLAD  Post PTCA  Varicose veins of both lower extremities 7/6/2016  Varicose veins of legs Past Surgical History:  
Procedure Laterality Date  CARDIAC SURG PROCEDURE UNLIST  2012 PCI x 1  
 HX COLONOSCOPY    
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    
 basal cell cancer removed x2 Allergies: Allergies Allergen Reactions  Lisinopril Cough  Losartan Unknown (comments) Patient unsure of reaction  Statins-Hmg-Coa Reductase Inhibitors Myalgia  Tape [Adhesive] Rash  
  and bandaids Physical Exam:  
General: NAD, Alert, Oriented, Appears their stated age HEENT: NC/AT, PERRL Skin: No rashes, lesions or wounds seen Psych: normal affect Heart: Regular Rate, Rhythm Lungs: unlabored respirations, normal breath sounds Abdomen: Soft and non-distended Ortho: Pain with limited ROM of the left knee Neuro: no focal defects, sensation is equal bilaterally Lymph: no lymphadenopathy Meds:  
Current Facility-Administered Medications Medication Dose Route Frequency  [START ON 12/6/2018] tuberculin injection 5 Units  5 Units IntraDERMal ONCE  
 ceFAZolin (ANCEF) 2 g/20 mL in sterile water IV syringe  2 g IntraVENous ONCE  
 lidocaine (XYLOCAINE) 10 mg/mL (1 %) injection 0.1 mL  0.1 mL SubCUTAneous PRN  
 lactated Ringers infusion  100 mL/hr IntraVENous CONTINUOUS  
 0.9% sodium chloride infusion  25 mL/hr IntraVENous CONTINUOUS  
 sodium chloride (NS) flush 5-10 mL  5-10 mL IntraVENous Q8H  
 sodium chloride (NS) flush 5-10 mL  5-10 mL IntraVENous PRN  
 fentaNYL citrate (PF) injection 100 mcg  100 mcg IntraVENous ONCE  
 midazolam (VERSED) injection 2 mg  2 mg IntraVENous ONCE PRN Labs: 
Admission on 12/05/2018 Component Date Value Ref Range Status  Sodium 12/05/2018 137  136 - 145 mmol/L Final  
 Glucose (POC) 12/05/2018 116* 65 - 100 mg/dL Final  
 Comment: 47 - 60 mg/dl Consistent with, but not fully diagnostic of hypoglycemia. 101 - 125 mg/dl Impaired fasting glucose/consistent with pre-diabetes mellitus 
> 126 mg/dl Fasting glucose consistent with overt diabetes mellitus Hospital Outpatient Visit on 11/12/2018 Component Date Value Ref Range Status  WBC 11/12/2018 6.8  4.3 - 11.1 K/uL Final  
 RBC 11/12/2018 4.35  4.23 - 5.6 M/uL Final  
 HGB 11/12/2018 12.6* 13.6 - 17.2 g/dL Final  
 HCT 11/12/2018 39.2* 41.1 - 50.3 % Final  
 MCV 11/12/2018 90.1  79.6 - 97.8 FL Final  
  MCH 11/12/2018 29.0  26.1 - 32.9 PG Final  
 MCHC 11/12/2018 32.1  31.4 - 35.0 g/dL Final  
 RDW 11/12/2018 12.8  % Final  
 PLATELET 19/50/0381 893  150 - 450 K/uL Final  
 MPV 11/12/2018 9.9  9.4 - 12.3 FL Final  
 ABSOLUTE NRBC 11/12/2018 0.00  0.0 - 0.2 K/uL Final  
 **Note: Absolute NRBC parameter is now reported with Hemogram**  
 DF 11/12/2018 AUTOMATED    Final  
 NEUTROPHILS 11/12/2018 37* 43 - 78 % Final  
 LYMPHOCYTES 11/12/2018 42  13 - 44 % Final  
 MONOCYTES 11/12/2018 9  4.0 - 12.0 % Final  
 EOSINOPHILS 11/12/2018 13* 0.5 - 7.8 % Final  
 BASOPHILS 11/12/2018 0  0.0 - 2.0 % Final  
 IMMATURE GRANULOCYTES 11/12/2018 0  0.0 - 5.0 % Final  
 ABS. NEUTROPHILS 11/12/2018 2.5  1.7 - 8.2 K/UL Final  
 ABS. LYMPHOCYTES 11/12/2018 2.9  0.5 - 4.6 K/UL Final  
 ABS. MONOCYTES 11/12/2018 0.6  0.1 - 1.3 K/UL Final  
 ABS. EOSINOPHILS 11/12/2018 0.9* 0.0 - 0.8 K/UL Final  
 ABS. BASOPHILS 11/12/2018 0.0  0.0 - 0.2 K/UL Final  
 ABS. IMM. GRANS. 11/12/2018 0.0  0.0 - 0.5 K/UL Final  
 Sodium 11/12/2018 130* 136 - 145 mmol/L Final  
 Potassium 11/12/2018 4.2  3.5 - 5.1 mmol/L Final  
 Chloride 11/12/2018 94* 98 - 107 mmol/L Final  
 CO2 11/12/2018 30  21 - 32 mmol/L Final  
 Anion gap 11/12/2018 6  mmol/L Final  
 Glucose 11/12/2018 86  65 - 100 mg/dL Final  
 Comment: 47 - 60 mg/dl Consistent with, but not fully diagnostic of hypoglycemia. 101 - 125 mg/dl Impaired fasting glucose/consistent with pre-diabetes mellitus 
> 126 mg/dl Fasting glucose consistent with overt diabetes mellitus  BUN 11/12/2018 12  8 - 23 MG/DL Final  
 Creatinine 11/12/2018 1.01  0.8 - 1.5 MG/DL Final  
 GFR est AA 11/12/2018 >60  >60 ml/min/1.73m2 Final  
 GFR est non-AA 11/12/2018 >60  ml/min/1.73m2 Final  
 Comment: (NOTE) Estimated GFR is calculated using the Modification of Diet in Renal  
Disease (MDRD) Study equation, reported for both  Americans [facility]) and non- Americans (GFRNA), and normalized to 1.73m2  
body surface area. The physician must decide which value applies to  
the patient. The MDRD study equation should only be used in  
individuals age 25 or older. It has not been validated for the  
following: pregnant women, patients with serious comorbid conditions,  
or on certain medications, or persons with extremes of body size,  
muscle mass, or nutritional status.  Calcium 11/12/2018 8.9  8.3 - 10.4 MG/DL Final  
 Special Requests: 11/12/2018 NO SPECIAL REQUESTS    Final  
 Culture result: 11/12/2018 SA target not detected. A MRSA NEGATIVE, SA NEGATIVE test result does not preclude MRSA or SA nasal colonization. Final  
 Prothrombin time 11/12/2018 14.1  11.5 - 14.5 sec Final  
 INR 11/12/2018 1.1    Final  
 Comment: Suggested therapeutic INR range: 
Venous thrombosis and embolus  2.0-3.0 Prosthetic heart valve         2.5-3.5 
** Note new reference range and method ** 
  
 aPTT 11/12/2018 28.9  23.2 - 35.3 SEC Final  
 Comment: Heparin Therapeutic Range = 74 - 123 seconds In addition to factor deficiency, monitoring heparin therapy, etc., evaluation of a prolonged aPTT result should include consideration of preanalytic variables such as heparin flush contamination, specimen integrity issues, etc. 
  
 Color 11/12/2018 YELLOW    Final  
 Appearance 11/12/2018 CLEAR    Final  
 Specific gravity 11/12/2018 1.007  1.001 - 1.023   Final  
 pH (UA) 11/12/2018 7.0  5.0 - 9.0   Final  
 Protein 11/12/2018 NEGATIVE   NEG mg/dL Final  
 Glucose 11/12/2018 NEGATIVE   mg/dL Final  
 Ketone 11/12/2018 NEGATIVE   NEG mg/dL Final  
 Bilirubin 11/12/2018 NEGATIVE   NEG   Final  
 Blood 11/12/2018 NEGATIVE   NEG   Final  
 Urobilinogen 11/12/2018 0.2  0.2 - 1.0 EU/dL Final  
 Nitrites 11/12/2018 NEGATIVE   NEG   Final  
 Leukocyte Esterase 11/12/2018 NEGATIVE   NEG   Final  
 
 
 
 
 
 
 
 Patient Active Problem List  
Diagnosis Code  Hypertension I10  
 CAD (coronary artery disease) I25.10  Hyperlipidemia E78.5  Mitral valve regurgitation I34.0  Varicose veins of both lower extremities I83.93  
 Hyponatremia E87.1  Osteoarthritis M19.90 Signed By: Boaz Williamson MD 
December 5, 2018

## 2018-12-05 NOTE — PERIOP NOTES
TRANSFER - OUT REPORT: 
 
Verbal report given to SiddharthaHobobe) on Yrn Milton  being transferred to [facility] for routine progression of care Report consisted of patients Situation, Background, Assessment and  
Recommendations(SBAR). Information from the following report(s) SBAR, Kardex, [facility] and Recent Results was reviewed with the receiving nurse. Lines:  
Peripheral IV 12/05/18 Left Hand (Active) Site Assessment Clean, dry, & intact 12/5/2018  6:08 AM  
Phlebitis Assessment 0 12/5/2018  6:08 AM  
Infiltration Assessment 0 12/5/2018  6:08 AM  
Dressing Status Clean, dry, & intact 12/5/2018  6:08 AM  
Dressing Type Transparent;Tape 12/5/2018  6:08 AM  
Hub Color/Line Status Infusing 12/5/2018  6:08 AM  
Action Taken Blood drawn 12/5/2018  6:08 AM  
  
 
Opportunity for questions and clarification was provided. Patient transported with: 
 [employer]

## 2018-12-05 NOTE — PROGRESS NOTES
TRANSFER - IN REPORT: 
 
Verbal report received from Darío Castaneda (name) on Jin Miller  being received from PACU (unit) for routine progression of care Report consisted of patients Situation, Background, Assessment and  
Recommendations(SBAR). Information from the following report(s) SBAR, Kardex, Intake/Output, MAR and Recent Results was reviewed with the receiving nurse. Opportunity for questions and clarification was provided. Assessment will be completed upon patients arrival to unit and care assumed.

## 2018-12-06 VITALS
BODY MASS INDEX: 27.34 KG/M2 | TEMPERATURE: 97.5 F | OXYGEN SATURATION: 98 % | DIASTOLIC BLOOD PRESSURE: 77 MMHG | RESPIRATION RATE: 16 BRPM | SYSTOLIC BLOOD PRESSURE: 138 MMHG | HEIGHT: 74 IN | HEART RATE: 71 BPM | WEIGHT: 213 LBS

## 2018-12-06 LAB
ANION GAP SERPL CALC-SCNC: 6 MMOL/L
BUN SERPL-MCNC: 14 MG/DL (ref 8–23)
CALCIUM SERPL-MCNC: 8.3 MG/DL (ref 8.3–10.4)
CHLORIDE SERPL-SCNC: 99 MMOL/L (ref 98–107)
CO2 SERPL-SCNC: 28 MMOL/L (ref 21–32)
CREAT SERPL-MCNC: 1.05 MG/DL (ref 0.8–1.5)
GLUCOSE SERPL-MCNC: 157 MG/DL (ref 65–100)
HGB BLD-MCNC: 10.3 G/DL (ref 13.6–17.2)
POTASSIUM SERPL-SCNC: 4.4 MMOL/L (ref 3.5–5.1)
SODIUM SERPL-SCNC: 133 MMOL/L (ref 136–145)

## 2018-12-06 PROCEDURE — 74011250637 HC RX REV CODE- 250/637: Performed by: PHYSICIAN ASSISTANT

## 2018-12-06 PROCEDURE — 36415 COLL VENOUS BLD VENIPUNCTURE: CPT

## 2018-12-06 PROCEDURE — 85018 HEMOGLOBIN: CPT

## 2018-12-06 PROCEDURE — 97150 GROUP THERAPEUTIC PROCEDURES: CPT

## 2018-12-06 PROCEDURE — 97116 GAIT TRAINING THERAPY: CPT

## 2018-12-06 PROCEDURE — 80048 BASIC METABOLIC PNL TOTAL CA: CPT

## 2018-12-06 PROCEDURE — 97535 SELF CARE MNGMENT TRAINING: CPT

## 2018-12-06 PROCEDURE — 97110 THERAPEUTIC EXERCISES: CPT

## 2018-12-06 PROCEDURE — 94760 N-INVAS EAR/PLS OXIMETRY 1: CPT

## 2018-12-06 RX ADMIN — ACETAMINOPHEN 1000 MG: 500 TABLET, FILM COATED ORAL at 05:41

## 2018-12-06 RX ADMIN — CELECOXIB 200 MG: 200 CAPSULE ORAL at 09:35

## 2018-12-06 RX ADMIN — HYDROMORPHONE HYDROCHLORIDE 2 MG: 2 TABLET ORAL at 05:42

## 2018-12-06 RX ADMIN — ACETAMINOPHEN 1000 MG: 500 TABLET, FILM COATED ORAL at 11:43

## 2018-12-06 RX ADMIN — Medication 1 AMPULE: at 09:36

## 2018-12-06 RX ADMIN — Medication 200 MG: at 09:35

## 2018-12-06 RX ADMIN — HYDROMORPHONE HYDROCHLORIDE 2 MG: 2 TABLET ORAL at 14:10

## 2018-12-06 RX ADMIN — ACETAMINOPHEN 1000 MG: 500 TABLET, FILM COATED ORAL at 00:34

## 2018-12-06 RX ADMIN — ASPIRIN 81 MG: 81 TABLET, COATED ORAL at 09:35

## 2018-12-06 RX ADMIN — Medication 5 ML: at 05:41

## 2018-12-06 RX ADMIN — SENNOSIDES AND DOCUSATE SODIUM 2 TABLET: 8.6; 5 TABLET ORAL at 09:36

## 2018-12-06 RX ADMIN — SENNOSIDES AND DOCUSATE SODIUM 2 TABLET: 8.6; 5 TABLET ORAL at 09:35

## 2018-12-06 NOTE — PROGRESS NOTES
2018 Post Op day: 1 Day Post-OpProcedure(s) (LRB): 
KNEE ARTHROPLASTY TOTAL LEFT/ (Left) Admit Date: 2018 Admit Diagnosis: Unilateral primary osteoarthritis, left knee [M17.12] LAB:   
Recent Results (from the past 24 hour(s)) HEMOGLOBIN Collection Time: 18  7:15 PM  
Result Value Ref Range HGB 11.1 (L) 13.6 - 17.2 g/dL HEMOGLOBIN Collection Time: 18  5:39 AM  
Result Value Ref Range HGB 10.3 (L) 13.6 - 17.2 g/dL METABOLIC PANEL, BASIC Collection Time: 18  5:39 AM  
Result Value Ref Range Sodium 133 (L) 136 - 145 mmol/L Potassium 4.4 3.5 - 5.1 mmol/L Chloride 99 98 - 107 mmol/L  
 CO2 28 21 - 32 mmol/L Anion gap 6 mmol/L Glucose 157 (H) 65 - 100 mg/dL BUN 14 8 - 23 MG/DL Creatinine 1.05 0.8 - 1.5 MG/DL  
 GFR est AA >60 >60 ml/min/1.73m2 GFR est non-AA >60 ml/min/1.73m2 Calcium 8.3 8.3 - 10.4 MG/DL Vital Signs:   
Patient Vitals for the past 8 hrs: 
 BP Temp Pulse Resp SpO2  
18 0301 119/69 97.8 °F (36.6 °C) 77 18 96 % 18 0012 131/73 97.8 °F (36.6 °C) 89 16 94 % Temp (24hrs), Av.7 °F (36.5 °C), Min:97.4 °F (36.3 °C), Max:98.1 °F (36.7 °C) Body mass index is 27.35 kg/m². Pain Control:  
Pain Assessment Pain Scale 1: Visual 
Pain Intensity 1: 0 Pain Onset 1: at rest 
Pain Location 1: Knee Pain Orientation 1: Left Pain Description 1: Aching Pain Intervention(s) 1: Medication (see MAR) Subjective: Doing well, No complaints, No SOB, No Chest Pain, No Nausea or Vomitting Objective: Vital Signs are Stable, No Acute Distress, Alert and Oriented, Dressing is Dry,  Neurovascular exam is normal. 
  
 
PT/OT:  
  
  
  
Assistive Device: Walker (comment) LLE AROM 
L Knee Flexion: 75 L Knee Extension: 10 Weight Bearing Status: WBAT [facility] 
[facility] [[facility]filled[facility] Assessment:  
Patient Active Problem List  
Diagnosis Code  Hypertension I10  
  CAD (coronary artery disease) I25.10  Hyperlipidemia E78.5  Mitral valve regurgitation I34.0  Varicose veins of both lower extremities I83.93  
 Hyponatremia E87.1  Osteoarthritis M19.90  
 Osteoarthritis of left knee M17.12  Status post left knee replacement U78.076 Plan: Continue Physical Therapy, Monitor  Labs, D/C home today or tomorrow Signed By: Jesica Reno NP

## 2018-12-06 NOTE — PROGRESS NOTES
Patient resting quietly, alert and oriented, no distress noted. Left knee dressing c/d/i, velez with clear, yellow urine draining. Patient encouraged to use incentive spirometer. IV patent infusing fluids as ordered. Fresh ice placed in iceman. Neurovascular and peripheral vascular checks WNL. Bed low and locked position. Call light within reach. Patient instructed to call for assistance, verbalizes understanding. Nursing assessment complete.

## 2018-12-06 NOTE — PROGRESS NOTES
Problem: Self Care Deficits Care Plan (Adult) Goal: *Acute Goals and Plan of Care (Insert Text) GOALS:  
DISCHARGE GOALS (in preparation for going home/rehab):  3 days  All goals met 12/6/2018 1. Mr. Vinny Juan will perform one lower body dressing activity with minimal assistance required to demonstrate improved functional mobility and safety. 2.  Mr. Vinny Juan will perform one lower body bathing activity with minimal assistance required to demonstrate improved functional mobility and safety. 3.  Mr. Vinny Juan will perform toileting/toilet transfer with contact guard assistance to demonstrate improved functional mobility and safety. 4.  Mr. Vinny Juan will perform shower transfer with contact guard assistance to demonstrate improved functional mobility and safety. JOINT CAMP OCCUPATIONAL THERAPY TKA: Daily Note and AM 12/6/2018INPATIENT: Hospital Day: 2 [place] / [place] / Product Type: Medicare /  
  
NAME/AGE/GENDER: Brayan River is a 77 y.o. male PRIMARY DIAGNOSIS:  Unilateral primary osteoarthritis, left knee [M17.12] Procedure(s) and Anesthesia Type: 
   * KNEE ARTHROPLASTY TOTAL LEFT/ - General (Left) ICD-10: Treatment Diagnosis:  
 · Pain in Left Knee (M25.562) · Stiffness of Left Knee, Not elsewhere classified (M25.662) ASSESSMENT:  
Mr. Vinny Juan is s/p L TKA and presents with decreased weight bearing on L LE and decreased independence with functional mobility and activities of daily living. Patient completed shower and dressing as charter below in ADL grid and is ambulating with rolling walker and supervison assist.  Patient has met 4/4 goals and plans to return home with good family support. Family able to provide patient with appropriate level of assistance at this time. OT reviewed safety precautions throughout session and therapy schedule for the remainder of today.   Patient instructed to call for assistance when needing to get up from recliner and all needs in reach. Patient verbalized understanding of call light. This section established at most recent assessment PROBLEM LIST (Impairments causing functional limitations): 1. Decreased Strength 2. Decreased ADL/Functional Activities 3. Decreased Transfer Abilities 4. Increased Pain 5. Increased Fatigue 6. Decreased Flexibility/Joint Mobility 7. Decreased Knowledge of Precautions INTERVENTIONS PLANNED: (Benefits and precautions of occupational therapy have been discussed with the patient.) 1. Activities of daily living training 2. Adaptive equipment training 3. Balance training 4. Clothing management 5. Donning&doffing training 6. Theraputic activity TREATMENT PLAN: Frequency/Duration: Follow patient qd to address above goals. Rehabilitation Potential For Stated Goals: Good RECOMMENDED REHABILITATION/EQUIPMENT: (at time of discharge pending progress): Continue Skilled Therapy and Home Health: Physical Therapy. OCCUPATIONAL PROFILE AND HISTORY:  
History of Present Injury/Illness (Reason for Referral): Pt presents this date s/p (L) TKA. Past Medical History/Comorbidities:  
Mr. Reji Rivera  has a past medical history of Arthritis, CAD (coronary artery disease), Chest pain, unspecified, Environmental and seasonal allergies, H/O echocardiogram, Hyperlipidemia, Hypertension, Mitral valve regurgitation, NSTEMI (non-ST elevated myocardial infarction) ([facility] [address] Post PTCA, Status post left knee replacement, Varicose veins of both lower extremities, and Varicose veins of legs. Mr. Reji Rivera  has a past surgical history that includes pr cardiac surg procedure unlist (2012); hx knee arthroscopy (Left, 2001); hx other surgical (2003); hx lumbar diskectomy (1988); hx other surgical; and hx colonoscopy. Social History/Living Environment:  
Patient Expects to be Discharged to[de-identified] Other (comment)(to OR) Prior Level of Function/Work/Activity: 
independent Number of Personal Factors/Comorbidities that affect the Plan of Care: Brief history (0):  LOW COMPLEXITY ASSESSMENT OF OCCUPATIONAL PERFORMANCE[de-identified]  
Most Recent Physical Functioning:  
Balance Sitting: Intact Standing: With support Mental Status Neurologic State: Alert Orientation Level: Oriented X4 Cognition: Follows commands Perception: Appears intact Perseveration: No perseveration noted Safety/Judgement: Fall prevention Basic ADLs (From Assessment) Complex ADLs (From Assessment) Basic ADL Feeding: Setup Oral Facial Hygiene/Grooming: Setup Bathing: Moderate assistance Type of Bath: Chlorhexidine (CHG), Full, Shower Upper Body Dressing: Setup Lower Body Dressing: Maximum assistance Toileting: Maximum assistance Grooming/Bathing/Dressing Activities of Daily Living Grooming Grooming Assistance: Supervision/set up Washing Face: Supervision/set-up Washing Hands: Supervision/set-up Brushing Teeth: Supervision/set-up Shaving: Supervision/set-up Cognitive Retraining Safety/Judgement: Fall prevention Upper Body Bathing Bathing Assistance: Supervision/set-up Position Performed: Standing Cues: Verbal cues provided Adaptive Equipment: Grab bar Feeding Feeding Assistance: Supervision/set-up Lower Body Bathing Bathing Assistance: Supervision/set-up Perineal  : Supervision/set-up Position Performed: Standing Cues: Verbal cues provided Adaptive Equipment: Grab bar Lower Body : Supervision/set-up Position Performed: Standing Cues: Verbal cues provided Adaptive Equipment: Grab bar Toileting Toileting Assistance: Supervision/set up Upper Body Dressing Assistance Dressing Assistance: Supervision/set-up Pullover Shirt: Supervision/set-up Functional Transfers Bathroom Mobility: Supervision/set up Toilet Transfer : Supervision Shower Transfer: Supervision Cues: Verbal cues provided Adaptive Equipment: Grab bars; Bedside commode;Walker (comment) Lower Body Dressing Assistance Dressing Assistance: Supervision/set up Underpants: Supervision/set-up Pants With Elastic Waist: Supervision/set-up Slip on Shoes with Back: Supervision/set-up Adaptive Equipment Used: [address] Bed/Mat Mobility Sit to Stand: Supervision Bed to Chair: Supervision Physical Skills Involved: 
1. Balance 2. Strength 3. Activity Tolerance Cognitive Skills Affected (resulting in the inability to perform in a timely and safe manner): 1. none Psychosocial Skills Affected: 1. none Number of elements that affect the Plan of Care: 1-3:  LOW COMPLEXITY CLINICAL DECISION MAKING:  
[facility] AM-PAC 6 Clicks Daily Activity Inpatient Short Form How much help from another person does the patient currently need. .. Total A Lot A Little None 1. Putting on and taking off regular lower body clothing? [] 1   [] 2   [x] 3   [] 4  
2. Bathing (including washing, rinsing, drying)? [] 1   [] 2   [] 3   [x] 4  
3. Toileting, which includes using toilet, bedpan or urinal?   [] 1   [] 2   [] 3   [x] 4  
4. Putting on and taking off regular upper body clothing? [] 1   [] 2   [] 3   [x] 4  
5. Taking care of personal grooming such as brushing teeth? [] 1   [] 2   [] 3   [x] 4  
6. Eating meals? [] 1   [] 2   [] 3   [x] 4  
© 2007, Trustees of [facility], under license to [employer]. All rights reserved Score:  Initial: 18 Most Recent: 23 (Date: 12-6-18 ) Interpretation of Tool:  Represents activities that are increasingly more difficult (i.e. Bed mobility, Transfers, Gait). Score 24 23 22-20 19-15 14-10 9-7 6 Modifier CH CI CJ CK CL CM CN   
 
? Self Care:  
  - CURRENT STATUS: CK - 40%-59% impaired, limited or restricted  - GOAL STATUS: CJ - 20%-39% impaired, limited or restricted  - D/C STATUS:  CI - 1%-19% impaired, limited or restricted [place] / [place] / Product Type: Medicare /   
 
Medical Necessity:    
· Patient is expected to demonstrate progress in strength, balance, coordination and functional technique to increase independence with self care and functional mobility. Reason for Services/Other Comments: 
· Patient continues to require skilled intervention due to decrease self care and mobility. Use of outcome tool(s) and clinical judgement create a POC that gives a: LOW COMPLEXITY  
  
 
 
 
TREATMENT:  
(In addition to Assessment/Re-Assessment sessions the following treatments were rendered) Pre-treatment Symptoms/Complaints: Tolerated sitting in chair Pain: Initial:  
Pain Intensity 1: 0 0 Post Session:  0 Self Care: (40): Procedure(s) (per grid) utilized to improve and/or restore self-care/home management as related to dressing, bathing, toileting and grooming. Required minimal verbal and   cueing to facilitate activities of daily living skills and compensatory activities. Treatment/Session Assessment:   
 Response to Treatment:  Tolerated well. Education: 
[] Home Exercises [x] Fall Precautions [] Hip Precautions [] Going Home Video [x] Knee/Hip Prosthesis Review [x] Walker Management/Safety [x] Adaptive Equipment as Needed Interdisciplinary Collaboration:  
o Physical Therapist 
o Occupational Therapist 
o Registered Nurse After treatment position/precautions:  
o Up in chair 
o Bed/Chair-wheels locked 
o Caregiver at bedside 
o Call light within reach 
o RN notified 
o LEs reclined Compliance with Program/Exercises: Compliant all of the time. Recommendations: Pt doing well all goals met and will do well at home with support from family. Patient will be discharged home with home health PT. No further Occupational Therapy warranted, will discharge Occupational Therapy services. Total Treatment Duration: OT Patient Time In/Time Out Time In: 0830 Time Out: [id number] Elisabeth Gracia, OT

## 2018-12-06 NOTE — DISCHARGE INSTRUCTIONS
[address] [facility] [place]   Patient Discharge Instructions    Carlos Number / [id number] : 1950    Admitted 2018 Discharged: 2018     IF YOU HAVE ANY PROBLEMS ONCE YOU ARE AT HOME CALL THE FOLLOWING NUMBERS:   Main office number: [phone]      Medications    · The medications you are to continue on are listed on the medication reconciliation sheet. · Narcotic pain medications as well as supplemental iron can cause constipation. If this occurs try stopping the narcotic pain medication and/or the iron. · It is important that you take the medication exactly as they are prescribed. · Medications which increase your risk of blood clots are listed to stop for 5 weeks after surgery as well as medications or supplements which increase your risk of bleeding complications. · Keep your medication in the bottles provided by the pharmacist and keep a list of the medication names, dosages, and times to be taken in your wallet. · Do not take other medications without consulting your doctor. Important Information    Do NOT smoke as this will greatly increase your risk of infection! Resume your prehospital diet. If you have excessive nausea or vomitting call your doctor's office     Leg swelling and warmth is normal for 6 months after surgery. If you experience swelling in your leg elevate you leg while laying down with your toes above your heart. If you have sudden onset severe swelling with leg pain call our office. The stitches deep inside take approximately 6 months to dissolve. There will be sharp shooting, stinging and burning pain. This is normal and will resolve between 3-6 months after surgery. Difficulty sleeping is normal following total Knee and Hip replacement. You may try melatonin, an over-the-counter sleep aid or benadryl to help with sleep. Most patients will resume sleeping through the night 8 weeks after surgery. Home Physical Therapy is arranged.  Home Health will contact you within 48 hrs of discharge that you have chosen. If you have not received a call within this time frame please contact that provider you chose. You should be given this information before you leave the hospital.     You are at a risk for falls. Use the rolling walker when walking. Patients who have had a joint replacement should not drive if they are still taking narcotic pain mediation during the daytime hours. Most patients wean themselves off of pain medication within 2-5 weeks after surgery. When to Call the office    - If you have a temperature greater then 101  - Uncontrolled vomiting   - Loose control of your bladder or bowel function  - Are unable to bear any weight   - Need a pain medication refill       DISCHARGE SUMMARY from Nurse    The following personal items collected during your admission are returned to you:   Dental Appliance: Dental Appliances: Lowers, Uppers, With patient  Vision: Visual Aid: Glasses  Hearing Aid:   na  Jewelry: Jewelry: None  Clothing: Clothing: At bedside  Other Valuables: Other Valuables: Cell Phone  Valuables sent to safe: Personal Items Sent to Safe: n/a    PATIENT INSTRUCTIONS:    After general anesthesia or intravenous sedation, for 24 hours or while taking prescription Narcotics:  · Limit your activities  · Do not drive and operate hazardous machinery  · Do not make important personal or business decisions  · Do  not drink alcoholic beverages  · If you have not urinated within 8 hours after discharge, please contact your surgeon on call.     Report the following to your surgeon:  · Excessive pain, swelling, redness or odor of or around the surgical area  · Temperature over 101  · Nausea and vomiting lasting longer than 4 hours or if unable to take medications  · Any signs of decreased circulation or nerve impairment to extremity: change in color, persistent  numbness, tingling, coldness or increase pain  · Any questions, call office @ [phone]      Keep scheduled follow up appointment. If need to change, call office @ [phone]. *  Please give a list of your current medications to your Primary Care Provider. *  Please update this list whenever your medications are discontinued, doses are      changed, or new medications (including over-the-counter products) are added. *  Please carry medication information at all times in case of emergency situations. Total Knee Replacement: What to Expect at [address] [place] [address]    When you leave the hospital, you should be able to move around with a walker or crutches. But you will need someone to help you at home for the next few weeks or until you have more energy and can move around better. If there is no one to help you at home, you may go to a rehabilitation center. You will go home with a bandage and stitches, staples, tissue glue, or tape strips. Change the bandage as your doctor tells you to. If you have stitches or staples, your doctor will remove them 10 to 21 days after your surgery. Glue or tape strips will fall off on their own over time. You may still have some mild pain, and the area may be swollen for 3 to 6 months after surgery. Your knee will continue to improve for 6 to 12 months. You will probably use a walker for 1 to 3 weeks and then use crutches. When you are ready, you can use a cane. You will probably be able to walk on your own in 4 to 8 weeks. You will need to do months of physical rehabilitation (rehab) after a knee replacement. Rehab will help you strengthen the muscles of the knee and help you regain movement. After you recover, your artificial knee will allow you to do normal daily activities with less pain or no pain at all. You may be able to hike, dance, ride a bike, and play golf. Talk to your doctor about whether you can do more strenuous activities. Always tell your caregivers that you have an artificial knee.   How long it will take to walk on your own, return to normal activities, and go back to work depends on your health and how well your rehabilitation (rehab) program goes. The better you do with your rehab exercises, the quicker you will get your strength and movement back. This care sheet gives you a general idea about how long it will take for you to recover. But each person recovers at a different pace. Follow the steps below to get better as quickly as possible. How can you care for yourself at home? Activity    · Rest when you feel tired. You may take a nap, but do not stay in bed all day. When you sit, use a chair with arms. You can use the arms to help you stand up.     · Work with your physical therapist to find the best way to exercise. You may be able to take frequent, short walks using crutches or a walker. What you can do as your knee heals will depend on whether your new knee is cemented or uncemented. You may not be able to do certain things for a while if your new knee is uncemented.     · After your knee has healed enough, you can do more strenuous activities with caution. ? You can golf, but use a golf cart, and do not wear shoes with spikes. ? You can bike on a flat road or on a stationary bike. Avoid biking up hills. ? Your doctor may suggest that you stay away from activities that put stress on your knee. These include tennis or badminton, squash or racquetball, contact sports like football, jumping (such as in basketball), jogging, or running. ? Avoid activities where you might fall. These include horseback riding, skiing, and mountain biking.     · Do not sit for more than 1 hour at a time. Get up and walk around for a while before you sit again. If you must sit for a long time, prop up your leg with a chair or footstool. This will help you avoid swelling.     · Ask your doctor when you can drive again.  It may take up to 8 weeks after knee replacement surgery before it is safe for you to drive.     · When you get into a car, sit on the edge of the seat. Then pull in your legs, and turn to face the front.     · You should be able to do many everyday activities 3 to 6 weeks after your surgery. You will probably need to take 4 to 16 weeks off from work. When you can go back to work depends on the type of work you do and how you feel.     · Ask your doctor when it is okay for you to have sex.     · Do not lift anything heavier than 10 pounds and do not lift weights for 12 weeks. Diet    · By the time you leave the hospital, you should be eating your normal diet. If your stomach is upset, try bland, low-fat foods like plain rice, broiled chicken, toast, and yogurt. Your doctor may suggest that you take iron and vitamin supplements.     · Drink plenty of fluids (unless your doctor tells you not to).   · Eat healthy foods, and watch your portion sizes. Try to stay at your ideal weight. Too much weight puts more stress on your new knee.     · You may notice that your bowel movements are not regular right after your surgery. This is common. Try to avoid constipation and straining with bowel movements. You may want to take a fiber supplement every day. If you have not had a bowel movement after a couple of days, ask your doctor about taking a mild laxative. Medicines    · Your doctor will tell you if and when you can restart your medicines. He or she will also give you instructions about taking any new medicines.     · If you take blood thinners, such as warfarin (Coumadin), clopidogrel (Plavix), or aspirin, be sure to talk to your doctor. He or she will tell you if and when to start taking those medicines again. Make sure that you understand exactly what your doctor wants you to do.     · Your doctor may give you a blood-thinning medicine to prevent blood clots. If you take a blood thinner, be sure you get instructions about how to take your medicine safely. Blood thinners can cause serious bleeding problems.  This medicine could be in pill form or as a shot (injection). If a shot is necessary, your doctor will tell you how to do this.     · Be safe with medicines. Take pain medicines exactly as directed. ? If the doctor gave you a prescription medicine for pain, take it as prescribed. ? If you are not taking a prescription pain medicine, ask your doctor if you can take an over-the-counter medicine. ? Plan to take your pain medicine 30 minutes before exercises. It is easier to prevent pain before it starts than to stop it once it has started.     · If you think your pain medicine is making you sick to your stomach:  ? Take your medicine after meals (unless your doctor has told you not to). ? Ask your doctor for a different pain medicine.     · If your doctor prescribed antibiotics, take them as directed. Do not stop taking them just because you feel better. You need to take the full course of antibiotics. Incision care    · If your doctor told you how to care for your cut (incision), follow your doctor's instructions. You will have a dressing over the cut. A dressing helps the incision heal and protects it. Your doctor will tell you how to take care of this.     · If you did not get instructions, follow this general advice:  ? If you have strips of tape on the cut the doctor made, leave the tape on for a week or until it falls off.  ? If you have stitches or staples, your doctor will tell you when to come back to have them removed. ? If you have skin adhesive on the cut, leave it on until it falls off. Skin adhesive is also called glue or liquid stitches. ? Change the bandage every day. ? Wash the area daily with warm water, and pat it dry. Don't use hydrogen peroxide or alcohol. They can slow healing. ? You may cover the area with a gauze bandage if it oozes fluid or rubs against clothing. ? You may shower 24 to 48 hours after surgery. Pat the incision dry.  Don't swim or take a bath for the first 2 weeks, or until your doctor tells you it is okay.   Exercise    · Your rehab program will give you a number of exercises to do to help you get back your knee's range of motion and strength. Always do them as your therapist tells you. Ice and elevation    · For pain and swelling, put ice or a cold pack on the area for 10 to 20 minutes at a time. Put a thin cloth between the ice and your skin. Other instructions    · Continue to wear your support stockings as your doctor says. These help to prevent blood clots. The length of time that you will have to wear them depends on your activity level and the amount of swelling.     · You have metal pieces in your knee. These may set off some airport metal detectors. Carry a medical alert card that says you have an artificial joint, just in case. Follow-up care is a key part of your treatment and safety. Be sure to make and go to all appointments, and call your doctor if you are having problems. It's also a good idea to know your test results and keep a list of the medicines you take. When should you call for help? Call 911 anytime you think you may need emergency care. For example, call if:    · You passed out (lost consciousness).     · You have severe trouble breathing.     · You have sudden chest pain and shortness of breath, or you cough up blood.    Call your doctor now or seek immediate medical care if:    · You have signs of infection, such as:  ? Increased pain, swelling, warmth, or redness. ? Red streaks leading from the incision. ? Pus draining from the incision. ? A fever.     · You have signs of a blood clot, such as:  ? Pain in your calf, back of the knee, thigh, or groin. ?  Redness and swelling in your leg or groin.     · Your incision comes open and begins to bleed, or the bleeding increases.     · You have pain that does not get better after you take pain medicine.    Watch closely for changes in your health, and be sure to contact your doctor if:    · You do not have a bowel movement after taking a laxative. Where can you learn more? Go to http://ovidio-katie.info/. Enter L885 in the search box to learn more about \"Total Knee Replacement: What to Expect at Home. \"  Current as of: November 29, 2017  Content Version: 11.8  © 4164-7646 [employer]. Care instructions adapted under license by [employer] (which disclaims liability or warranty for this information). If you have questions about a medical condition or this instruction, always ask your healthcare professional. [address] any warranty or liability for your use of this information. These are general instructions for a healthy lifestyle:    No smoking/ No tobacco products/ Avoid exposure to second hand smoke    Surgeon General's Warning:  Quitting smoking now greatly reduces serious risk to your health. Obesity, smoking, and sedentary lifestyle greatly increases your risk for illness    A healthy diet, regular physical exercise & weight monitoring are important for maintaining a healthy lifestyle    You may be retaining fluid if you have a history of heart failure or if you experience any of the following symptoms:  Weight gain of 3 pounds or more overnight or 5 pounds in a week, increased swelling in our hands or feet or shortness of breath while lying flat in bed. Please call your doctor as soon as you notice any of these symptoms; do not wait until your next office visit. Recognize signs and symptoms of STROKE:    F-face looks uneven    A-arms unable to move or move even    S-speech slurred or non-existent    T-time-call 911 as soon as signs and symptoms begin-DO NOT go       Back to bed or wait to see if you get better-TIME IS BRAIN. The discharge information has been reviewed with the patient. The patient verbalized understanding.       Information obtained by :  I understand that if any problems occur once I am at home I am to contact my physician. I understand and acknowledge receipt of the instructions indicated above.                                                                                                                                            Physician's or R.N.'s Signature                                                                  Date/Time                                                                                                                                              Patient or Representative Signature                                                          Date/Time

## 2018-12-06 NOTE — PROGRESS NOTES
Problem: Mobility Impaired (Adult and Pediatric) Goal: *Acute Goals and Plan of Care (Insert Text) GOALS (1-4 days): 
(1.)Mr. Vonnie Jenkins will move from supine to sit and sit to supine  in bed with STAND BY ASSIST. 
(2.)Mr. Vonnie Jenkins will transfer from bed to chair and chair to bed with STAND BY ASSIST using the least restrictive device. goal met (3.)Mr. Vonnie Jenkins will ambulate with STAND BY ASSIST for 150 feet with the least restrictive device. goal met (4.)Mr. Vonnie Jenkins will ambulate up/down 3 steps with left railing with MINIMAL ASSIST with device as needed. goal met (5.)Mr. Vonnie Jenkins will increase left knee ROM to 5°-80°. 
________________________________________________________________________________________________ PHYSICAL THERAPY [facility] tKa: Daily Note, Discharge, PM 12/6/2018INPATIENT: Hospital Day: 2 [place] / Product Type: Medicare /  
  
NAME/AGE/GENDER: Niyah Enriquez is a 77 y.o. male PRIMARY DIAGNOSIS:  Unilateral primary osteoarthritis, left knee [M17.12] Procedure(s) and Anesthesia Type: 
   * KNEE ARTHROPLASTY TOTAL LEFT/ - General (Left) ICD-10: Treatment Diagnosis:  
 · Pain in Left Knee (M25.562) · Stiffness of Left Knee, Not elsewhere classified (M25.662) · Difficulty in walking, Not elsewhere classified (R26.2) ASSESSMENT:  
Mr. Vonnie Jenkins presents with decreased rom and strength of left LE as well as decreased functional mobility and gait s/p left tka. He plans to go home with HHPT. Pt. Cholo Lauren to do well. He wants to go home. He has no questions or concerns about leaving today. Discussed safety and importance of swelling control and rom. Talked about expecting more pain and swelling tonight as the block fully wears off. He did well with gait and no difficulty with stairs. He did tka exercises with cues only. Good rom.  
Mr. Cassidy Rojas functional progress occurred more rapidly than expected as evidenced by goal attainment. He will be discharged to his home environment with a PT HEP, assistive device(s), and Home Health PT services. This section established at most recent assessment PROBLEM LIST (Impairments causing functional limitations): 1. Decreased Strength 2. Decreased ADL/Functional Activities 3. Decreased Transfer Abilities 4. Decreased Ambulation Ability/Technique 5. Decreased Balance 6. Increased Pain 7. Decreased Activity Tolerance 8. Decreased Flexibility/Joint Mobility 9. Decreased [place] with Home Exercise Program 
 INTERVENTIONS PLANNED: (Benefits and precautions of physical therapy have been discussed with the patient.) 1. Bed Mobility 2. Gait Training 3. Home Exercise Program (HEP) 4. Therapeutic Exercise/Strengthening 5. Transfer Training 6. Range of Motion: active/assisted/passive 7. Therapeutic Activities 8. Group Therapy TREATMENT PLAN: Frequency/Duration: Follow patient BID for duration of hospital stay to address above goals. Rehabilitation Potential For Stated Goals: Good RECOMMENDED REHABILITATION/EQUIPMENT: (at time of discharge pending progress): Continue Skilled Therapy and Home Health: Physical Therapy. HISTORY:  
History of Present Injury/Illness (Reason for Referral): S/p left tka Past Medical History/Comorbidities:  
Mr. Juan Landaverde  has a past medical history of Arthritis, CAD (coronary artery disease), Chest pain, unspecified, Environmental and seasonal allergies, H/O echocardiogram, Hyperlipidemia, Hypertension, Mitral valve regurgitation, NSTEMI (non-ST elevated myocardial infarction) ([address] Post PTCA, Status post left knee replacement, Varicose veins of both lower extremities, and Varicose veins of legs.   Mr. Juan Landaverde  has a past surgical history that includes pr cardiac surg procedure unlist (2012); hx knee arthroscopy (Left, 2001); hx other surgical (2003); hx lumbar diskectomy (1988); hx other surgical; hx colonoscopy; and KNEE ARTHROPLASTY TOTAL LEFT/ (Left, 12/5/2018). Social History/Living Environment:  
Patient Expects to be Discharged to[de-identified] Other (comment)(to OR) Prior Level of Function/Work/Activity: 
independent Number of Personal Factors/Comorbidities that affect the Plan of Care: 1-2: MODERATE COMPLEXITY EXAMINATION:  
Most Recent Physical Functioning: LLE AROM 
L Knee Flexion: 91 L Knee Extension: 7 LLE Strength L Knee Flexion: 3 L Knee Extension: 3 Bed Mobility Supine to Sit: Stand-by assistance Transfers Sit to Stand: Supervision Stand to Sit: Supervision Bed to Chair: Stand-by assistance Balance Sitting: Intact Standing: With support Weight Bearing Status Left Side Weight Bearing: As tolerated Distance (ft): 150 Feet (ft)(also 80 feet) Ambulation - Level of Assistance: Stand-by assistance;Supervision Assistive Device: Walker, rolling Speed/Muna: Delayed Step Length: Left shortened;Right shortened Stance: Left decreased Gait Abnormalities: Antalgic Number of Stairs Trained: 3 Stairs - Level of Assistance: Contact guard assistance Rail Use: None(cane only) Interventions: Safety awareness training;Verbal cues Braces/Orthotics:  
 
Left Knee Cold Type: Cryocuff Body Structures Involved: 1. Bones 2. Joints 3. Muscles 4. Ligaments Body Functions Affected: 1. Movement Related Activities and Participation Affected: 1. Mobility Number of elements that affect the Plan of Care: 3: MODERATE COMPLEXITY CLINICAL PRESENTATION:  
Presentation: Stable and uncomplicated: LOW COMPLEXITY CLINICAL DECISION MAKING:  
Booker Grady AM-PAC 6 Clicks Basic Mobility Inpatient Short Form How much difficulty does the patient currently have. .. Unable A Lot A Little None 1. Turning over in bed (including adjusting bedclothes, sheets and blankets)? [] 1   [] 2   [] 3   [x] 4 2.  Sitting down on and standing up from a chair with arms ( e.g., wheelchair, bedside commode, etc.)   [] 1   [] 2   [] 3   [x] 4  
3. Moving from lying on back to sitting on the side of the bed? [] 1   [] 2   [] 3   [x] 4 How much help from another person does the patient currently need. .. Total A Lot A Little None 4. Moving to and from a bed to a chair (including a wheelchair)? [] 1   [] 2   [] 3   [x] 4  
5. Need to walk in hospital room? [] 1   [] 2   [] 3   [x] 4  
6. Climbing 3-5 steps with a railing? [] 1   [] 2   [] 3   [x] 4  
© 2007, Trustees of [address] [place] [address] [address] [address], under license to [employer]. All rights reserved Score:  Initial: 16 Most Recent: 24 (Date: -- ) Interpretation of Tool:  Represents activities that are increasingly more difficult (i.e. Bed mobility, Transfers, Gait). Score 24 23 22-20 19-15 14-10 9-7 6 Modifier CH CI CJ CK CL CM CN   
 
? Mobility - Walking and Moving Around:  
  - CURRENT STATUS: CK - 40%-59% impaired, limited or restricted  - GOAL STATUS: CJ - 20%-39% impaired, limited or restricted  - D/C STATUS:  CH - 0% impaired, limited or restricted [place] / Product Type: Medicare /   
 
Medical Necessity:    
· Patient is expected to demonstrate progress in strength, range of motion and balance to decrease assistance required with theraputic exercises and functional mobility. Reason for Services/Other Comments: 
· Patient continues to require present interventions due to patient's inability to perform theraputic exercises and functional mobility independently. Use of outcome tool(s) and clinical judgement create a POC that gives a: Clear prediction of patient's progress: LOW COMPLEXITY  
  
 
 
 
TREATMENT:  
(In addition to Assessment/Re-Assessment sessions the following treatments were rendered) Pre-treatment Symptoms/Complaints:  Knee/thigh sore Pain: Initial: Post Session:  3 Therapeutic Exercise: (45 Minutes):  Exercises per grid below to improve mobility and strength. Required minimal visual, verbal and manual cues to promote proper body alignment, promote proper body posture and promote proper body mechanics. Progressed range and repetitions as indicated. Gait Training (15 Minutes):  Gait training to improve and/or restore physical functioning as related to mobility, strength and balance. Ambulated 150 Feet (ft)(also 80 feet) with Stand-by assistance;Supervision using a Walker, rolling and minimal Safety awareness training;Verbal cues related to their stance phase to promote proper body alignment, promote proper body posture and promote proper body mechanics. Date: 
12/5 Date: 
12/6 Date: 
  
ACTIVITY/EXERCISE AM PM AM PM AM PM  
GROUP THERAPY  []  []  []  [x]  []  [] Ankle Pumps  10a 10a 15a Quad Sets  10a 10a 15a Gluteal Sets  10a 10a 15a Hip ABd/ADduction  10a 10a 15a Straight Leg Raises   10a 15a Knee Slides  10a 10a 15a Short Arc Quads    15a [facility] [facility] Chair Slides    15a    
        
B = bilateral; AA = active assistive; A = active; P = passive Treatment/Session Assessment:   
 Response to Treatment:  Pt. Made good progress Education: 
[x] Home Exercises [x] Fall Precautions [] Hip Precautions [x] D/C Instruction Review [x] Knee/Hip Prosthesis Review [x] Walker Management/Safety [] Adaptive Equipment as Needed Interdisciplinary Collaboration:  
o Physical Therapist 
o Registered Nurse 
o Rehabilitation Attendant After treatment position/precautions:  
o Up in chair 
o Bed/Chair-wheels locked 
o Bed in low position 
o Caregiver at bedside 
o Call light within reach 
o Family at bedside Compliance with Program/Exercises: Compliant most of the time. No questions. Recommendations/Intent for next treatment session:  Pt. Going home Total Treatment Duration: PT Patient Time In/Time Out Time In: 1300 Time Out: 1400 Silver Kelley PT

## 2018-12-06 NOTE — PROGRESS NOTES
12/06/18 2201 Oxygen Therapy O2 Sat (%) 98 % Pulse via Oximetry 69 beats per minute O2 Device Room air Pre-Treatment Breath Sounds Bilateral Clear Incentive Spirometry Treatment Actual Volume (ml) 2500 ml

## 2018-12-06 NOTE — PROGRESS NOTES
Problem: Falls - Risk of 
Goal: *Absence of Falls Document [place] Fall Risk and appropriate interventions in the flowsheet. Outcome: Progressing Towards Goal 
Fall Risk Interventions: 
Mobility Interventions: Patient to call before getting OOB Mentation Interventions: Evaluate medications/consider consulting pharmacy Medication Interventions: Patient to call before getting OOB Elimination Interventions: Patient to call for help with toileting needs

## 2018-12-06 NOTE — PROGRESS NOTES
Spiritual Care initial visit made by [employer]. Support given. Swetha campbell. [facility] Div / Bereavement Coordinator

## 2018-12-06 NOTE — PROGRESS NOTES
Problem: Mobility Impaired (Adult and Pediatric) Goal: *Acute Goals and Plan of Care (Insert Text) GOALS (1-4 days): 
(1.)Mr. Jai Reilly will move from supine to sit and sit to supine  in bed with STAND BY ASSIST. 
(2.)Mr. Jai Reilly will transfer from bed to chair and chair to bed with STAND BY ASSIST using the least restrictive device. (3.)Mr. Jai Reilly will ambulate with STAND BY ASSIST for 150 feet with the least restrictive device. (4.)Mr. Jai Reilly will ambulate up/down 3 steps with left railing with MINIMAL ASSIST with device as needed. (5.)Mr. Jai Reilly will increase left knee ROM to 5°-80°. 
________________________________________________________________________________________________ PHYSICAL THERAPY Joint camp tKa: Daily Note, PM 12/6/2018INPATIENT: Hospital Day: 2 [place] / Product Type: Medicare /  
  
NAME/AGE/GENDER: Patrick Hahn is a 77 y.o. male PRIMARY DIAGNOSIS:  Unilateral primary osteoarthritis, left knee [M17.12] Procedure(s) and Anesthesia Type: 
   * KNEE ARTHROPLASTY TOTAL LEFT/ - General (Left) ICD-10: Treatment Diagnosis:  
 · Pain in Left Knee (M25.562) · Stiffness of Left Knee, Not elsewhere classified (M25.662) · Difficulty in walking, Not elsewhere classified (R26.2) ASSESSMENT:  
Mr. Jai Reilly presents with decreased rom and strength of left LE as well as decreased functional mobility and gait s/p left tka. He plans to go home with HHPT. Pt. Doing well this am.  He hopes to go home today. He did well with gait in room with walker and did tka exercises with cues. Reviewed safety and importance of rom and nerve block wearing off today. No questions. This section established at most recent assessment PROBLEM LIST (Impairments causing functional limitations): 1. Decreased Strength 2. Decreased ADL/Functional Activities 3. Decreased Transfer Abilities 4. Decreased Ambulation Ability/Technique 5. Decreased Balance 6. Increased Pain 7. Decreased Activity Tolerance 8. Decreased Flexibility/Joint Mobility 9. Decreased [place] with Home Exercise Program 
 INTERVENTIONS PLANNED: (Benefits and precautions of physical therapy have been discussed with the patient.) 1. Bed Mobility 2. Gait Training 3. Home Exercise Program (HEP) 4. Therapeutic Exercise/Strengthening 5. Transfer Training 6. Range of Motion: active/assisted/passive 7. Therapeutic Activities 8. Group Therapy TREATMENT PLAN: Frequency/Duration: Follow patient BID for duration of hospital stay to address above goals. Rehabilitation Potential For Stated Goals: Good RECOMMENDED REHABILITATION/EQUIPMENT: (at time of discharge pending progress): Continue Skilled Therapy and Home Health: Physical Therapy. HISTORY:  
History of Present Injury/Illness (Reason for Referral): S/p left tka Past Medical History/Comorbidities:  
Mr. Jai Reilly  has a past medical history of Arthritis, CAD (coronary artery disease), Chest pain, unspecified, Environmental and seasonal allergies, H/O echocardiogram, Hyperlipidemia, Hypertension, Mitral valve regurgitation, NSTEMI (non-ST elevated myocardial infarction) ([facility] [address] Post PTCA, Status post left knee replacement, Varicose veins of both lower extremities, and Varicose veins of legs. Mr. Jai Reilly  has a past surgical history that includes pr cardiac surg procedure unlist (2012); hx knee arthroscopy (Left, 2001); hx other surgical (2003); hx lumbar diskectomy (1988); hx other surgical; hx colonoscopy; and KNEE ARTHROPLASTY TOTAL LEFT/ (Left, 12/5/2018). Social History/Living Environment:  
Patient Expects to be Discharged to[de-identified] Other (comment)(to OR) Prior Level of Function/Work/Activity: 
independent Number of Personal Factors/Comorbidities that affect the Plan of Care: 1-2: MODERATE COMPLEXITY EXAMINATION:  
Most Recent Physical Functioning: LLE AROM 
L Knee Flexion: 75 L Knee Extension: 10  
 
 Bed Mobility Supine to Sit: Stand-by assistance Transfers Sit to Stand: Stand-by assistance Stand to Sit: Stand-by assistance Bed to Chair: Stand-by assistance Balance Sitting: Intact Standing: With support Weight Bearing Status Left Side Weight Bearing: As tolerated Distance (ft): 25 Feet (ft) Ambulation - Level of Assistance: Stand-by assistance Assistive Device: Walker, rolling Speed/Muna: Delayed Step Length: Left shortened;Right shortened Stance: Left decreased Gait Abnormalities: Antalgic Interventions: Safety awareness training;Verbal cues Braces/Orthotics:  
 
Left Knee Cold Type: Cryocuff Body Structures Involved: 1. Bones 2. Joints 3. Muscles 4. Ligaments Body Functions Affected: 1. Movement Related Activities and Participation Affected: 1. Mobility Number of elements that affect the Plan of Care: 3: MODERATE COMPLEXITY CLINICAL PRESENTATION:  
Presentation: Stable and uncomplicated: LOW COMPLEXITY CLINICAL DECISION MAKIN[address] [place] [id number] AM-PAC 6 Clicks Basic Mobility Inpatient Short Form How much difficulty does the patient currently have. .. Unable A Lot A Little None 1. Turning over in bed (including adjusting bedclothes, sheets and blankets)? [] 1   [] 2   [x] 3   [] 4  
2. Sitting down on and standing up from a chair with arms ( e.g., wheelchair, bedside commode, etc.)   [] 1   [] 2   [x] 3   [] 4  
3. Moving from lying on back to sitting on the side of the bed? [] 1   [] 2   [x] 3   [] 4 How much help from another person does the patient currently need. .. Total A Lot A Little None 4. Moving to and from a bed to a chair (including a wheelchair)? [] 1   [] 2   [x] 3   [] 4  
5. Need to walk in hospital room? [] 1   [x] 2   [] 3   [] 4  
6. Climbing 3-5 steps with a railing? [] 1   [x] 2   [] 3   [] 4  
© , Trustees of [address] [place] [address], under license to [employer]. All rights reserved Score:  Initial: 16 Most Recent: X (Date: -- ) Interpretation of Tool:  Represents activities that are increasingly more difficult (i.e. Bed mobility, Transfers, Gait). Score 24 23 22-20 19-15 14-10 9-7 6 Modifier CH CI CJ CK CL CM CN   
 
? Mobility - Walking and Moving Around:  
  - CURRENT STATUS: CK - 40%-59% impaired, limited or restricted  - GOAL STATUS: CJ - 20%-39% impaired, limited or restricted  - D/C STATUS:  ---------------To be determined--------------- [place] / Product Type: Medicare /   
 
Medical Necessity:    
· Patient is expected to demonstrate progress in strength, range of motion and balance to decrease assistance required with theraputic exercises and functional mobility. Reason for Services/Other Comments: 
· Patient continues to require present interventions due to patient's inability to perform theraputic exercises and functional mobility independently. Use of outcome tool(s) and clinical judgement create a POC that gives a: Clear prediction of patient's progress: LOW COMPLEXITY  
  
 
 
 
TREATMENT:  
(In addition to Assessment/Re-Assessment sessions the following treatments were rendered) Pre-treatment Symptoms/Complaints:  Knee/thigh sore Pain: Initial:  
   Post Session:  Did not rate Therapeutic Exercise: (15 Minutes):  Exercises per grid below to improve mobility and strength. Required minimal visual, verbal and manual cues to promote proper body alignment, promote proper body posture and promote proper body mechanics. Progressed range and repetitions as indicated. Gait Training (10 Minutes):  Gait training to improve and/or restore physical functioning as related to mobility, strength and balance.   Ambulated 25 Feet (ft) with Stand-by assistance using a Walker, rolling and minimal Safety awareness training;Verbal cues related to their stance phase to promote proper body alignment, promote proper body posture and promote proper body mechanics. Date: 
12/5 Date: 
12/6 Date: 
  
ACTIVITY/EXERCISE AM PM AM PM AM PM  
GROUP THERAPY  []  []  []  []  []  [] Ankle Pumps  10a 10a Quad Sets  10a 10a Gluteal Sets  10a 10a Hip ABd/ADduction  10a 10a Straight Leg Raises   10a Knee Slides  10a 10a Short Arc [employer] [place] [facility] [address] Chair Slides B = bilateral; AA = active assistive; A = active; P = passive Treatment/Session Assessment:   
 Response to Treatment:  Pt. Did great. Education: 
[x] Home Exercises [x] Fall Precautions [] Hip Precautions [] D/C Instruction Review [x] Knee/Hip Prosthesis Review [x] Walker Management/Safety [] Adaptive Equipment as Needed Interdisciplinary Collaboration:  
o Registered Nurse After treatment position/precautions:  
o Up in chair 
o Bed/Chair-wheels locked 
o Bed in low position 
o Call light within reach Compliance with Program/Exercises: Will assess as treatment progresses. No questions. Recommendations/Intent for next treatment session:  Treatment next visit will focus on increasing Mr. Garcias independence with bed mobility, transfers, gait training, strength/ROM exercises, modalities for pain, and patient education. Total Treatment Duration: PT Patient Time In/Time Out Time In: 1538 Time Out: 0820 Laurie Reynolds, PT

## 2018-12-06 NOTE — PROGRESS NOTES
Patient A/O x 4 pain has been well controlled, denies any nausea, patient has been up in recliner, moving lower extremities with no deficits. All Neuro Vascular checked are WDL as documented, no needs at this time.

## 2018-12-09 ENCOUNTER — PATIENT OUTREACH (OUTPATIENT)
Dept: CASE MANAGEMENT | Age: 68
End: 2018-12-09

## 2019-01-14 ENCOUNTER — HOSPITAL ENCOUNTER (OUTPATIENT)
Dept: PHYSICAL THERAPY | Age: 69
Discharge: HOME | End: 2019-01-14
Payer: MEDICARE

## 2019-01-14 PROCEDURE — 97161 PT EVAL LOW COMPLEX 20 MIN: CPT | Performed by: PHYSICAL THERAPIST

## 2019-01-14 PROCEDURE — 97110 THERAPEUTIC EXERCISES: CPT | Performed by: PHYSICAL THERAPIST

## 2019-01-14 NOTE — THERAPY EVALUATION
Kota Paul  : 1950  [place]  Secondary: [address] at [address]  [place] [address], [address], [place] [address]  [phone]   [phone]         OUTPATIENT PHYSICAL THERAPY:Initial Assessment and Daily Note 2019    ICD-10: Treatment Diagnosis: (M25.562) L knee pain; (M25.662) L knee stiffness; (M62.81) muscle weakness; (R 26.2) Difficulty Walking  Precautions/Allergies:   Lisinopril; Losartan; Statins-hmg-coa reductase inhibitors; and Tape [adhesive]     MD Orders: Evaluate and Treat for L TKA (exercises and HEP) MEDICAL/REFERRING DIAGNOSIS:  Presence of left artificial knee joint [Z96.652]   DATE OF ONSET: 18  REFERRING PHYSICIAN: Dasha Iyer., *  RETURN PHYSICIAN APPOINTMENT: 2019     INITIAL ASSESSMENT:  Mr. Lucy Kim presents today w/ s/s consistent with the diagnosis of L TKA with decreased L knee AROM, strength, balance and gait. His gait is minimally antalgic with a delayed L HS/TO sequencing. He scored a 41% disability rating on the LEFS. Pt will benefit from skilled PT to address the following deficits. PROBLEM LIST (Impacting functional limitations):  1. Decreased Strength  2. Decreased ADL/Functional Activities  3. Decreased Ambulation Ability/Technique  4. Decreased Balance  5. Increased Pain  6. Decreased Flexibility/Joint Mobility  7. Edema/Girth  8. Decreased [place] with Home Exercise Program INTERVENTIONS PLANNED:  1. Balance Exercise  2. Bed Mobility  3. Cold  4. Family Education  5. Gait Training  6. Heat  7. Home Exercise Program (HEP)  8. Manual Therapy  9. Neuromuscular Re-education/Strengthening  10. Range of Motion (ROM)  11. Therapeutic Activites  12. Therapeutic Exercise/Strengthening  13. Ultrasound (US)  14. Kinesiotaping   TREATMENT PLAN:  Effective Dates: 2019 TO 2019 (90 days).  Frequency/Duration: 2 times a week for 90 Days  GOALS: (Goals have been discussed and agreed upon with patient.)  Short-Term Functional Goals: Time Frame: 4-6 weeks (2-25-19)  1. Pt will be compliant and independent with HEP. 2. Pt will improve score on the LEFS to >55/80 to increase pt's overall functional mobility. 3. Pt will improve L knee AROM to 0-120deg to increase pt's ease with transfers and gait. 4. Pt will be able to sit-stand from standard height without use of UE or compensatory weight shifting to rise. 5. Pt will be able to ambulate with a normal gait pattern for community distances with LRD. 6. Pt will subjectively report decreased frequency (1-2x/night) of waking due to L knee pain. 7. Pt will be able to perform SLS x 5 sec on L without LOB to aide with improving strength and balance for gait. Discharge Goals: Time Frame: 8-12 weeks (4-14-19)  1. Pt will improve score on the LEFS to >65/80 to increase pt's overall functional mobility. 2.   Pt will improve L SLS to 8sec to increase pt's balance and control with gait activities. 3.   Pt will improve L knee AROM to 0-125deg to increase pt's ease with transfers, gait and balance. 4.   Pt will be able to sit-stand from toilet height w/out use of UE for assistance to rise. 5.   Pt will be able to ascend 6-8\" steps reciprocally and descend 6\" steps reciprocally with use of a rail with good form and control. 6.   Pt will be able to return to gym activities in the [place] [address] without c/o pain. 7.   Pt will be DC'd from PT to HEP. Rehabilitation Potential For Stated Goals: Good  Regarding Kerry Horacezion Pratt's therapy, I certify that the treatment plan above will be carried out by a therapist or under their direction. Thank you for this referral,  Cher Cooney, PT     Referring Physician Signature: Jerad Sidhu., *              Date                    The information in this section was collected on 1/14/19 (except where otherwise noted).   HISTORY:   History of Present Injury/Illness (Reason for Referral):  Pt reports he had a L knee scope in 2001 and a L TKA on 12/5/18. He's done fairly well with rehab at this point, and was [address] from [facility] on 1/11/19. He states his average pain is a 3-4/10, and he takes 2 Tylenol and ices 2x/day to manage his pain. He is very active around his home, and would like to be able to return to doing yard work, working in his shop, and working out at the [employer]. Past Medical History/Comorbidities:   Mr. Elena Pleitez  has a past medical history of Arthritis, CAD (coronary artery disease), Chest pain, unspecified, Environmental and seasonal allergies, H/O echocardiogram, Hyperlipidemia, Hypertension, Mitral valve regurgitation, NSTEMI (non-ST elevated myocardial infarction) ([facility] [address] Post PTCA, Status post left knee replacement, Varicose veins of both lower extremities, and Varicose veins of legs. Mr. Elena Pleitez  has a past surgical history that includes pr cardiac surg procedure unlist (2012); hx knee arthroscopy (Left, 2001); hx other surgical (2003); hx lumbar diskectomy (1988); hx other surgical; hx colonoscopy; and KNEE ARTHROPLASTY TOTAL LEFT/ (Left, 12/5/2018). Social History/Living Environment:     Pt reports he lives with his wife in a 2 story home with 3 steps to enter. He has a tub-shower combo, and owns a RW and std cane. Pt without an assistive device today. Prior Level of Function/Work/Activity:  Pt is retired, but very active around his home and in the community. Dominant Side:         RIGHT       Ambulatory/Rehab Services H2 Model Falls Risk Assessment (1/14/19)    Risk Factors:       (1)  Gender [Male] Ability to Rise from Chair:       (1)  Pushes up, successful in one attempt    Falls Prevention Plan:       No modifications necessary   Total: (5 or greater = High Risk): 2    ©2010 [facility] States Patent #[id number].  Federal Law prohibits the replication, distribution or use without written permission from [facility]       Current Medications:       Current Outpatient Medications:     chlorpheniramine (CHLOR-TRIMETON) 4 mg tablet, Take 4 mg by mouth every six (6) hours as needed for Allergies. , Disp: , Rfl:     HYDROmorphone (DILAUDID) 2 mg tablet, Take 1 Tab by mouth every four (4) hours as needed. Max Daily Amount: 12 mg., Disp: 40 Tab, Rfl: 0    celecoxib (CELEBREX) 200 mg capsule, Take 200 mg by mouth daily. , Disp: , Rfl:     acetaminophen (TYLENOL) 325 mg tablet, Take 325 mg by mouth every four (4) hours as needed for Pain., Disp: , Rfl:     b complex vitamins tablet, Take 1 Tab by mouth daily. , Disp: , Rfl:     Cetirizine (ZYRTEC) 10 mg cap, Take  by mouth., Disp: , Rfl:     amLODIPine (NORVASC) 5 mg tablet, Take 1 Tab by mouth as needed. (Patient taking differently: Take 5 mg by mouth as needed. For BP > 150/90), Disp: 90 Tab, Rfl: 3    nitroglycerin (NITROSTAT) 0.4 mg SL tablet, 1 Tab by SubLINGual route every five (5) minutes as needed for Chest Pain. (Patient taking differently: 0.4 mg by SubLINGual route every five (5) minutes as needed for Chest Pain. Never have had to use), Disp: 25 Tab, Rfl: 5    magnesium 250 mg tab, Take  by mouth., Disp: , Rfl:    Date Last Reviewed:  1/14/19   Number of Personal Factors/Comorbidities that affect the Plan of Care: 0: LOW COMPLEXITY   EXAMINATION:   Observation/Orthostatic Postural Assessment:          Pt sit/stands with normal posture and in no apparent distress. Min edema noted in L knee. Palpation:          Pt with moderate tenderness with palpation along medial and lateral L joint line of L knee. Moderate swelling still present. Incision is fully healed and pt is performing scar massage daily. Gait: Pt ambulates without an assistive device with delayed L HS/TO sequencing.    Atrophy: pt with atrophy noted in L quad and GS complex compared to the R.   Swelling/Girth (joint line): L: 47.0cm  Incision: Fully healed; no signs of infection  Rolan's Sign: Neg    LE AROM/Strength DATE  1/14/19 DATE     Hip Flexion R:   L:  R:   L:    Hip Abduction  R:   L:  R:   L:    Hip Extension  R:   L:  R:   L:    Knee Flexion  R: WFL; 4/5  L: 110deg; 3/5 R:   L:    Knee Extension  R: WFL; 4/5  L: resting 6deg; Active 3deg; 3/5 R:   L:    Ankle Dorsiflexion  R:   L: R:   L:   Ankle Plantarflexion  R:  L: R:  L:   Flexibility: HS/Quads/GS R:  L:      Special Tests/Function:  Stairs: Pt ascends/descends stairs with a step-to gait pattern. Balance:   Pt with good static balance as observed through activities in the gym today. Dynamic: further testing required. Standing Heel Raises: Able w/ UE support  Sit-stand: Uses B UE, as well as, compensatory weight shifting to the R to rise. Patellar position (static): normal  Patellar tracking:normal  Patellar mobility: hypomobile; pt instructed in patellar mobilizations to begin at home. Body Structures Involved:  1. Nerves  2. Bones  3. Joints  4. Muscles  5. Ligaments Body Functions Affected:  1. Sensory/Pain  2. Neuromusculoskeletal  3. Movement Related Activities and Participation Affected:  1. Mobility  2. Self Care  3. Domestic Life  4. Interpersonal Interactions and Relationships  5. Community, Social and [place]  6. Gym   Number of elements (examined above) that affect the Plan of Care: 1-2: LOW COMPLEXITY   CLINICAL PRESENTATION:   Presentation: Stable and uncomplicated: LOW COMPLEXITY   CLINICAL DECISION MAKING:   Outcome Measure: Tool Used: Lower Extremity Functional Scale (LEFS)  Score:  Initial: 47/80=41% disability Most Recent: X/80 (Date: -- )   Interpretation of Score: 20 questions each scored on a 5 point scale with 0 representing \"extreme difficulty or unable to perform\" and 4 representing \"no difficulty\". The lower the score, the greater the functional disability. 80/80 represents no disability. Minimal detectable change is 9 points.       Medical Necessity:   · Patient is expected to demonstrate progress in strength, range of motion and balance to improve gait quality and safety over level and unlevel surfaces so that he can return to his normal daily activities including yardwork and gym. .   Use of outcome tool(s) and clinical judgement create a POC that gives a: Clear prediction of patient's progress: LOW COMPLEXITY            TREATMENT:   (In addition to Assessment/Re-Assessment sessions the following treatments were rendered)  Pre-treatment Symptoms/Complaints:  Pt c/o L knee pain, stiffness, weakness and immobility. Pain: Initial:     4/10 Post Session:  3/10      THERAPEUTIC EXERCISE: (30 minutes):  Exercises per grid below to improve mobility and strength. Required moderate visual, verbal and tactile cues to promote proper body alignment, promote proper body posture, promote proper body mechanics and promote proper body breathing techniques. Progressed resistance, range, repetitions and complexity of movement as indicated. Re-assessment was performed today (see above assessment section). Separate time was dedicated today for this re-assessment. 20 minutes   Date:  1/14/19 Date:   Date:     Activity/Exercise Parameters Parameters Parameters   QS 10x     SLR 10x     SAQ 10x     Bridging w/ add sq 10x     Heel slides w/ ball 10x     HS/GS str 3 x 30sec     Education HEP/ICE       Treatment/Session Assessment:  See above  · Response to Treatment:  Pt with decreased L knee stiffness after above exercises. · Compliance with Program/Exercises: Will assess as treatment progresses. · Recommendations/Intent for next treatment session: \"Next visit will focus on advancements to more challenging activities and reduction in assistance provided\".      Variance to POC: NONE    Future Appointments   Date Time Provider [place]   1/16/2019  3:00 PM MURALI Rodrigez [facility]   1/23/2019 10:15 AM MURALI Rodrigez   1/25/2019 10:15 AM Heather Garcia, PT [facility]   1/30/2019 10:15 AM Lennoxe Jose, PT [facility]   2/1/2019 10:15 AM Lennoxe Jose, PT [facility]   2/6/2019 10:15 AM Heather Garcia, PT [facility]   2/8/2019 10:15 AM Heather Garcia, PT [facility]   2/25/2019  9:30 AM Irma Conroy MD [facility]   Total Treatment Duration: 50 minutes  PT Patient Time In/Time Out  Time In: 1020  Time Out: [address] [facility],

## 2019-01-16 ENCOUNTER — HOSPITAL ENCOUNTER (OUTPATIENT)
Dept: PHYSICAL THERAPY | Age: 69
Discharge: HOME | End: 2019-01-16
Payer: MEDICARE

## 2019-01-16 PROCEDURE — 97110 THERAPEUTIC EXERCISES: CPT | Performed by: PHYSICAL THERAPIST

## 2019-01-16 PROCEDURE — 97140 MANUAL THERAPY 1/> REGIONS: CPT | Performed by: PHYSICAL THERAPIST

## 2019-01-16 NOTE — PROGRESS NOTES
Valentina Manzo  : 1950  [place]  Secondary: [address] at [facility]  [facility], [address], [place]  [phone]   [phone]         OUTPATIENT PHYSICAL THERAPY:Daily Note 2019    ICD-10: Treatment Diagnosis: (M25.562) L knee pain; (M25.662) L knee stiffness; (M62.81) muscle weakness; (R 26.2) Difficulty Walking  Precautions/Allergies:   Lisinopril; Losartan; Statins-hmg-coa reductase inhibitors; and Tape [adhesive]     MD Orders: Evaluate and Treat for L TKA (exercises and HEP) MEDICAL/REFERRING DIAGNOSIS:  Presence of left artificial knee joint [Z96.652]   DATE OF ONSET: 18  REFERRING PHYSICIAN: Danielle Feliciano., *  RETURN PHYSICIAN APPOINTMENT: 2019     INITIAL ASSESSMENT:  Mr. Rocio Mcclelland presents today w/ s/s consistent with the diagnosis of L TKA with decreased L knee AROM, strength, balance and gait. His gait is minimally antalgic with a delayed L HS/TO sequencing. He scored a 41% disability rating on the LEFS. Pt will benefit from skilled PT to address the following deficits. PROBLEM LIST (Impacting functional limitations):  1. Decreased Strength  2. Decreased ADL/Functional Activities  3. Decreased Ambulation Ability/Technique  4. Decreased Balance  5. Increased Pain  6. Decreased Flexibility/Joint Mobility  7. Edema/Girth  8. Decreased [place] with Home Exercise Program INTERVENTIONS PLANNED:  1. Balance Exercise  2. Bed Mobility  3. Cold  4. Family Education  5. Gait Training  6. Heat  7. Home Exercise Program (HEP)  8. Manual Therapy  9. Neuromuscular Re-education/Strengthening  10. Range of Motion (ROM)  11. Therapeutic Activites  12. Therapeutic Exercise/Strengthening  13. Ultrasound (US)  14. Kinesiotaping   TREATMENT PLAN:  Effective Dates: 2019 TO 2019 (90 days).  Frequency/Duration: 2 times a week for 90 Days  GOALS: (Goals have been discussed and agreed upon with patient.)  Short-Term Functional Goals: Time Frame: 4-6 weeks (2-25-19)  1. Pt will be compliant and independent with HEP. 2. Pt will improve score on the LEFS to >55/80 to increase pt's overall functional mobility. 3. Pt will improve L knee AROM to 0-120deg to increase pt's ease with transfers and gait. 4. Pt will be able to sit-stand from standard height without use of UE or compensatory weight shifting to rise. 5. Pt will be able to ambulate with a normal gait pattern for community distances with LRD. 6. Pt will subjectively report decreased frequency (1-2x/night) of waking due to L knee pain. 7. Pt will be able to perform SLS x 5 sec on L without LOB to aide with improving strength and balance for gait. Discharge Goals: Time Frame: 8-12 weeks (4-14-19)  1. Pt will improve score on the LEFS to >65/80 to increase pt's overall functional mobility. 2.   Pt will improve L SLS to 8sec to increase pt's balance and control with gait activities. 3.   Pt will improve L knee AROM to 0-125deg to increase pt's ease with transfers, gait and balance. 4.   Pt will be able to sit-stand from toilet height w/out use of UE for assistance to rise. 5.   Pt will be able to ascend 6-8\" steps reciprocally and descend 6\" steps reciprocally with use of a rail with good form and control. 6.   Pt will be able to return to gym activities in the [place] [address] without c/o pain. 7.   Pt will be DC'd from PT to HEP. Rehabilitation Potential For Stated Goals: Good  Regarding Rae Pratt's therapy, I certify that the treatment plan above will be carried out by a therapist or under their direction. Thank you for this referral,  Hai Mendoza PT                 The information in this section was collected on 1/14/19 (except where otherwise noted). HISTORY:   History of Present Injury/Illness (Reason for Referral):  Pt reports he had a L knee scope in 2001 and a L TKA on 12/5/18.  He's done fairly well with rehab at this point, and was [address] from [facility] on 1/11/19. He states his average pain is a 3-4/10, and he takes 2 Tylenol and ices 2x/day to manage his pain. He is very active around his home, and would like to be able to return to doing yard work, working in his shop, and working out at the [employer]. Past Medical History/Comorbidities:   Mr. Nichol Cornelius  has a past medical history of Arthritis, CAD (coronary artery disease), Chest pain, unspecified (8/31/2012), Environmental and seasonal allergies, H/O echocardiogram (02/01/2018), Hyperlipidemia, Hypertension (8/31/2012), Mitral valve regurgitation, NSTEMI (non-ST elevated myocardial infarction) ([facility] [address] (09/02/2012), Post PTCA, Status post left knee replacement (12/5/2018), Varicose veins of both lower extremities (7/6/2016), and Varicose veins of legs. He also has no past medical history of Chronic kidney disease or Stroke ([facility] [address] Mr. Nichol Cornelius  has a past surgical history that includes pr cardiac surg procedure unlist (2012); hx knee arthroscopy (Left, 2001); hx other surgical (2003); hx lumbar diskectomy (1988); hx other surgical; and hx colonoscopy. Social History/Living Environment:     Pt reports he lives with his wife in a 2 story home with 3 steps to enter. He has a tub-shower combo, and owns a RW and std cane. Pt without an assistive device today. Prior Level of Function/Work/Activity:  Pt is retired, but very active around his home and in the community. Dominant Side:         RIGHT       Ambulatory/Rehab Services H2 Model Falls Risk Assessment (1/14/19)    Risk Factors:       (1)  Gender [Male] Ability to Rise from Chair:       (1)  Pushes up, successful in one attempt    Falls Prevention Plan:       No modifications necessary   Total: (5 or greater = High Risk): 2    ©2010 [facility]. [facility] States Patent #[id number].  Federal Law prohibits the replication, distribution or use without written permission from [facility]       Current Medications:       Current Outpatient Medications:     chlorpheniramine (CHLOR-TRIMETON) 4 mg tablet, Take 4 mg by mouth every six (6) hours as needed for Allergies. , Disp: , Rfl:     HYDROmorphone (DILAUDID) 2 mg tablet, Take 1 Tab by mouth every four (4) hours as needed. Max Daily Amount: 12 mg., Disp: 40 Tab, Rfl: 0    celecoxib (CELEBREX) 200 mg capsule, Take 200 mg by mouth daily. , Disp: , Rfl:     acetaminophen (TYLENOL) 325 mg tablet, Take 325 mg by mouth every four (4) hours as needed for Pain., Disp: , Rfl:     b complex vitamins tablet, Take 1 Tab by mouth daily. , Disp: , Rfl:     Cetirizine (ZYRTEC) 10 mg cap, Take  by mouth., Disp: , Rfl:     amLODIPine (NORVASC) 5 mg tablet, Take 1 Tab by mouth as needed. (Patient taking differently: Take 5 mg by mouth as needed. For BP > 150/90), Disp: 90 Tab, Rfl: 3    nitroglycerin (NITROSTAT) 0.4 mg SL tablet, 1 Tab by SubLINGual route every five (5) minutes as needed for Chest Pain. (Patient taking differently: 0.4 mg by SubLINGual route every five (5) minutes as needed for Chest Pain. Never have had to use), Disp: 25 Tab, Rfl: 5    magnesium 250 mg tab, Take  by mouth., Disp: , Rfl:    Date Last Reviewed:  1/14/19   EXAMINATION:   Observation/Orthostatic Postural Assessment:          Pt sit/stands with normal posture and in no apparent distress. Min edema noted in L knee. Palpation:          Pt with moderate tenderness with palpation along medial and lateral L joint line of L knee. Moderate swelling still present. Incision is fully healed and pt is performing scar massage daily. Gait: Pt ambulates without an assistive device with delayed L HS/TO sequencing.    Atrophy: pt with atrophy noted in L quad and GS complex compared to the R.   Swelling/Girth (joint line): L: 47.0cm  Incision: Fully healed; no signs of infection  Rolan's Sign: Neg    LE AROM/Strength DATE  1/14/19 DATE     Hip Flexion R:   L:  R:   L:    Hip Abduction  R:   L:  R:   L:    Hip Extension  R:   L:  R:   L:    Knee Flexion  R: WFL; 4/5  L: 110deg; 3/5 R:   L:    Knee Extension  R: WFL; 4/5  L: resting 6deg; Active 3deg; 3/5 R:   L:    Ankle Dorsiflexion  R:   L: R:   L:   Ankle Plantarflexion  R:  L: R:  L:   Flexibility: HS/Quads/GS R:  L:      Special Tests/Function:  Stairs: Pt ascends/descends stairs with a step-to gait pattern. Balance:   Pt with good static balance as observed through activities in the gym today. Dynamic: further testing required. Standing Heel Raises: Able w/ UE support  Sit-stand: Uses B UE, as well as, compensatory weight shifting to the R to rise. Patellar position (static): normal  Patellar tracking:normal  Patellar mobility: hypomobile; pt instructed in patellar mobilizations to begin at home. Body Structures Involved:  1. Nerves  2. Bones  3. Joints  4. Muscles  5. Ligaments Body Functions Affected:  1. Sensory/Pain  2. Neuromusculoskeletal  3. Movement Related Activities and Participation Affected:  1. Mobility  2. Self Care  3. Domestic Life  4. Interpersonal Interactions and Relationships  5. Community, Social and [place]  6. Gym   CLINICAL PRESENTATION:   CLINICAL DECISION MAKING:   Outcome Measure: Tool Used: Lower Extremity Functional Scale (LEFS)  Score:  Initial: 47/80=41% disability Most Recent: X/80 (Date: -- )   Interpretation of Score: 20 questions each scored on a 5 point scale with 0 representing \"extreme difficulty or unable to perform\" and 4 representing \"no difficulty\". The lower the score, the greater the functional disability. 80/80 represents no disability. Minimal detectable change is 9 points. Medical Necessity:   · Patient is expected to demonstrate progress in strength, range of motion and balance to improve gait quality and safety over level and unlevel surfaces so that he can return to his normal daily activities including yardwork and gym.  .            TREATMENT:   (In addition to Assessment/Re-Assessment sessions the following treatments were rendered)  Pre-treatment Symptoms/Complaints:  Pt reports L knee pain and stiffness continues, but he reports compliance with HEP. Pain: Initial:     4/10 Post Session:  3/10      THERAPEUTIC EXERCISE: (45 minutes):  Exercises per grid below to improve mobility and strength. Required moderate visual, verbal and tactile cues to promote proper body alignment, promote proper body posture, promote proper body mechanics and promote proper body breathing techniques. Progressed resistance, range, repetitions and complexity of movement as indicated. MANUAL THERAPY: (10minutes): STM/MFR  To L knee and surrounding musculature to aide with decreasing pain and tightness and improving overall mobility. Date:  1/14/19 Date:  1/16/19 Date:     Activity/Exercise Parameters Parameters Parameters   QS 10x     SLR 10x 20x L    SAQ 10x     Bridging w/ add sq 10x     Heel slides w/ ball 10x     HS/GS str 3 x 30sec 3 x 30 sec on board    S/L Hip ABD  20x B    Clams  20x B    6\" step-ups  30x     Sit-stand from elevated surface working on equal WB B and w/out use of UE  30x    Recumbent Stepper  Res 2, 10min    Education HEP/ICE HEP/ICE      Treatment/Session Assessment:  Pt was challenged by elevated sit-stand requiring verbal, visual and tactile cueing to maintain equal WB B, and avoid use of UE and compensatory weight shifting to rise due to B LE weakness, L knee pain and decreased proprioception. Pt fatigued very quickly with S/L hip abduction and clams due to B hip abductor weakness. Those were added to his HEP. Pt is slowly progressing toward current goals. · Response to Treatment:  Pt with decreased L knee stiffness/pain  after above exercises and STM/MFR to surrounding L knee musculature. · Compliance with Program/Exercises: Pt reports compliance with HEP 1-2x daily. · Recommendations/Intent for next treatment session:  \"Next visit will focus on advancements to more challenging activities and reduction in assistance provided\". Progress functional strength training as pt is able.      Variance to POC: NONE    Future Appointments   Date Time Provider [place]   1/23/2019 10:15 AM [facility] Marsh[facility]   1/25/2019 10:15 AM [facility]   1/30/2019 10:15 AM [facility] Marsh [facility]   2/1/2019 10:15 AM [facility]   2/6/2019 10:15 AM [facility] Marsh [facility]   2/8/2019 10:15 AM [facility]   2/25/2019  9:30 AM Hercules Olszewski, MD [facility]   Total Treatment Duration: 45 minutes  PT Patient Time In/Time Out  Time In: 1500  Time Out: [address], PT

## 2019-01-23 ENCOUNTER — HOSPITAL ENCOUNTER (OUTPATIENT)
Dept: PHYSICAL THERAPY | Age: 69
Discharge: HOME | End: 2019-01-23
Payer: MEDICARE

## 2019-01-23 PROCEDURE — 97110 THERAPEUTIC EXERCISES: CPT | Performed by: PHYSICAL THERAPIST

## 2019-01-23 NOTE — PROGRESS NOTES
Penny Addison  : 1950  [place]  Secondary: [address]  [facility] [address], [place] [address]  [phone]   [phone]         OUTPATIENT PHYSICAL THERAPY:Daily Note 2019    ICD-10: Treatment Diagnosis: (M25.562) L knee pain; (M25.662) L knee stiffness; (M62.81) muscle weakness; (R 26.2) Difficulty Walking  Precautions/Allergies:   Lisinopril; Losartan; Statins-hmg-coa reductase inhibitors; and Tape [adhesive]     MD Orders: Evaluate and Treat for L TKA (exercises and HEP) MEDICAL/REFERRING DIAGNOSIS:  Presence of left artificial knee joint [Z96.652]   DATE OF ONSET: 18  REFERRING PHYSICIAN: Dorota Massey, *  RETURN PHYSICIAN APPOINTMENT: 2019     INITIAL ASSESSMENT:  Mr. Ezequiel Contreras presents today w/ s/s consistent with the diagnosis of L TKA with decreased L knee AROM, strength, balance and gait. His gait is minimally antalgic with a delayed L HS/TO sequencing. He scored a 41% disability rating on the LEFS. Pt will benefit from skilled PT to address the following deficits. PROBLEM LIST (Impacting functional limitations):  1. Decreased Strength  2. Decreased ADL/Functional Activities  3. Decreased Ambulation Ability/Technique  4. Decreased Balance  5. Increased Pain  6. Decreased Flexibility/Joint Mobility  7. Edema/Girth  8. Decreased [place] with Home Exercise Program INTERVENTIONS PLANNED:  1. Balance Exercise  2. Bed Mobility  3. Cold  4. Family Education  5. Gait Training  6. Heat  7. Home Exercise Program (HEP)  8. Manual Therapy  9. Neuromuscular Re-education/Strengthening  10. Range of Motion (ROM)  11. Therapeutic Activites  12. Therapeutic Exercise/Strengthening  13. Ultrasound (US)  14. Kinesiotaping   TREATMENT PLAN:  Effective Dates: 2019 TO 2019 (90 days).  Frequency/Duration: 2 times a week for 90 Days  GOALS: (Goals have been discussed and agreed upon with patient.)  Short-Term Functional Goals: Time Frame: 4-6 weeks (2-25-19)  1. Pt will be compliant and independent with HEP. 2. Pt will improve score on the LEFS to >55/80 to increase pt's overall functional mobility. 3. Pt will improve L knee AROM to 0-120deg to increase pt's ease with transfers and gait. 4. Pt will be able to sit-stand from standard height without use of UE or compensatory weight shifting to rise. 5. Pt will be able to ambulate with a normal gait pattern for community distances with LRD. 6. Pt will subjectively report decreased frequency (1-2x/night) of waking due to L knee pain. 7. Pt will be able to perform SLS x 5 sec on L without LOB to aide with improving strength and balance for gait. Discharge Goals: Time Frame: 8-12 weeks (4-14-19)  1. Pt will improve score on the LEFS to >65/80 to increase pt's overall functional mobility. 2.   Pt will improve L SLS to 8sec to increase pt's balance and control with gait activities. 3.   Pt will improve L knee AROM to 0-125deg to increase pt's ease with transfers, gait and balance. 4.   Pt will be able to sit-stand from toilet height w/out use of UE for assistance to rise. 5.   Pt will be able to ascend 6-8\" steps reciprocally and descend 6\" steps reciprocally with use of a rail with good form and control. 6.   Pt will be able to return to gym activities in the [place] [address] without c/o pain. 7.   Pt will be DC'd from PT to HEP. Rehabilitation Potential For Stated Goals: Good  Regarding Darin Pratt's therapy, I certify that the treatment plan above will be carried out by a therapist or under their direction. Thank you for this referral,  Latisha Stuart, PT                 The information in this section was collected on 1/14/19 (except where otherwise noted). HISTORY:   History of Present Injury/Illness (Reason for Referral):  Pt reports he had a L knee scope in 2001 and a L TKA on 12/5/18.  He's done fairly well with rehab at this point, and was [address] from [facility] on 1/11/19. He states his average pain is a 3-4/10, and he takes 2 Tylenol and ices 2x/day to manage his pain. He is very active around his home, and would like to be able to return to doing yard work, working in his shop, and working out at the [employer]. Past Medical History/Comorbidities:   Mr. Becka Stahl  has a past medical history of Arthritis, CAD (coronary artery disease), Chest pain, unspecified (8/31/2012), Environmental and seasonal allergies, H/O echocardiogram (02/01/2018), Hyperlipidemia, Hypertension (8/31/2012), Mitral valve regurgitation, NSTEMI (non-ST elevated myocardial infarction) ([facility] [address] (09/02/2012), Post PTCA, Status post left knee replacement (12/5/2018), Varicose veins of both lower extremities (7/6/2016), and Varicose veins of legs. He also has no past medical history of Chronic kidney disease or Stroke ([facility] [address] Mr. Becka Stahl  has a past surgical history that includes pr cardiac surg procedure unlist (2012); hx knee arthroscopy (Left, 2001); hx other surgical (2003); hx lumbar diskectomy (1988); hx other surgical; and hx colonoscopy. Social History/Living Environment:     Pt reports he lives with his wife in a 2 story home with 3 steps to enter. He has a tub-shower combo, and owns a RW and std cane. Pt without an assistive device today. Prior Level of Function/Work/Activity:  Pt is retired, but very active around his home and in the community. Dominant Side:         RIGHT       Ambulatory/Rehab Services H2 Model Falls Risk Assessment (1/14/19)    Risk Factors:       (1)  Gender [Male] Ability to Rise from Chair:       (1)  Pushes up, successful in one attempt    Falls Prevention Plan:       No modifications necessary   Total: (5 or greater = High Risk): 2    ©2010 [facility] [place]. [facility] States Patent #[id number].  Federal Law prohibits the replication, distribution or use without written permission from [facility]       Current Medications:       Current Outpatient Medications:     chlorpheniramine (CHLOR-TRIMETON) 4 mg tablet, Take 4 mg by mouth every six (6) hours as needed for Allergies. , Disp: , Rfl:     HYDROmorphone (DILAUDID) 2 mg tablet, Take 1 Tab by mouth every four (4) hours as needed. Max Daily Amount: 12 mg., Disp: 40 Tab, Rfl: 0    celecoxib (CELEBREX) 200 mg capsule, Take 200 mg by mouth daily. , Disp: , Rfl:     acetaminophen (TYLENOL) 325 mg tablet, Take 325 mg by mouth every four (4) hours as needed for Pain., Disp: , Rfl:     b complex vitamins tablet, Take 1 Tab by mouth daily. , Disp: , Rfl:     Cetirizine (ZYRTEC) 10 mg cap, Take  by mouth., Disp: , Rfl:     amLODIPine (NORVASC) 5 mg tablet, Take 1 Tab by mouth as needed. (Patient taking differently: Take 5 mg by mouth as needed. For BP > 150/90), Disp: 90 Tab, Rfl: 3    nitroglycerin (NITROSTAT) 0.4 mg SL tablet, 1 Tab by SubLINGual route every five (5) minutes as needed for Chest Pain. (Patient taking differently: 0.4 mg by SubLINGual route every five (5) minutes as needed for Chest Pain. Never have had to use), Disp: 25 Tab, Rfl: 5    magnesium 250 mg tab, Take  by mouth., Disp: , Rfl:    Date Last Reviewed:  1/14/19   EXAMINATION:   Observation/Orthostatic Postural Assessment:          Pt sit/stands with normal posture and in no apparent distress. Min edema noted in L knee. Palpation:          Pt with moderate tenderness with palpation along medial and lateral L joint line of L knee. Moderate swelling still present. Incision is fully healed and pt is performing scar massage daily. Gait: Pt ambulates without an assistive device with delayed L HS/TO sequencing.    Atrophy: pt with atrophy noted in L quad and GS complex compared to the R.   Swelling/Girth (joint line): L: 47.0cm  Incision: Fully healed; no signs of infection  Rolan's Sign: Neg    LE AROM/Strength DATE  1/14/19 DATE     Hip Flexion R:   L:  R:   L:    Hip Abduction  R:   L:  R:   L:    Hip Extension  R:   L:  R:   L:    Knee Flexion  R: WFL; 4/5  L: 110deg; 3/5 R:   L:    Knee Extension  R: WFL; 4/5  L: resting 6deg; Active 3deg; 3/5 R:   L:    Ankle Dorsiflexion  R:   L: R:   L:   Ankle Plantarflexion  R:  L: R:  L:   Flexibility: HS/Quads/GS R:  L:      Special Tests/Function:  Stairs: Pt ascends/descends stairs with a step-to gait pattern. Balance:   Pt with good static balance as observed through activities in the gym today. Dynamic: further testing required. Standing Heel Raises: Able w/ UE support  Sit-stand: Uses B UE, as well as, compensatory weight shifting to the R to rise. Patellar position (static): normal  Patellar tracking:normal  Patellar mobility: hypomobile; pt instructed in patellar mobilizations to begin at home. Body Structures Involved:  1. Nerves  2. Bones  3. Joints  4. Muscles  5. Ligaments Body Functions Affected:  1. Sensory/Pain  2. Neuromusculoskeletal  3. Movement Related Activities and Participation Affected:  1. Mobility  2. Self Care  3. Domestic Life  4. Interpersonal Interactions and Relationships  5. Community, Social and [place]  6. Gym   CLINICAL PRESENTATION:   CLINICAL DECISION MAKING:   Outcome Measure: Tool Used: Lower Extremity Functional Scale (LEFS)  Score:  Initial: 47/80=41% disability Most Recent: X/80 (Date: -- )   Interpretation of Score: 20 questions each scored on a 5 point scale with 0 representing \"extreme difficulty or unable to perform\" and 4 representing \"no difficulty\". The lower the score, the greater the functional disability. 80/80 represents no disability. Minimal detectable change is 9 points. Medical Necessity:   · Patient is expected to demonstrate progress in strength, range of motion and balance to improve gait quality and safety over level and unlevel surfaces so that he can return to his normal daily activities including yardwork and gym.  .            TREATMENT:   (In addition to Assessment/Re-Assessment sessions the following treatments were rendered)  Pre-treatment Symptoms/Complaints:  Pt reports B knee pain and stiffness today due to the cold, rainy weather. Pt reports compliance with HEP, and plans to return to the [place] [address] later this week. Pain: Initial:     5-6/10 Post Session:  3/10      THERAPEUTIC EXERCISE: (40 minutes):  Exercises per grid below to improve mobility and strength. Required moderate visual, verbal and tactile cues to promote proper body alignment, promote proper body posture, promote proper body mechanics and promote proper body breathing techniques. Progressed resistance, range, repetitions and complexity of movement as indicated. MANUAL THERAPY: (0minutes): STM/MFR  To L knee and surrounding musculature to aide with decreasing pain and tightness and improving overall mobility. Date:  1/14/19 Date:  1/16/19 Date:  1/23/19   Activity/Exercise Parameters Parameters Parameters   QS 10x     SLR 10x 20x L    SAQ 10x     Bridging w/ add sq 10x     Heel slides w/ ball 10x     Gait in clinic   W/ TAs/GS working on HS/TO seq B and appropriate step-length, 200'   Resisted diagonals    RTB, thighs, 50' x 4L   Resisted side-stepping    RTB, thighs, 50' x 4L   High Knee Marching working on balance and control   In gym, 50' x 6L   HS/GS str 3 x 30sec 3 x 30 sec on board 3 x 30 sec on board   S/L Hip ABD  20x B    Clams  20x B    6\" step-ups  30x     Sit-stand from elevated surface working on equal WB B and w/out use of UE  30x 30x   Recumbent Stepper  Res 2, 10min Res 3, 10min   Education HEP/ICE HEP/ICE HEP/ICE     Treatment/Session Assessment:  Pt with much improved sit-stand quality today. He fatigued with resisted side stepping and diagonals due to residual hip abductor weakness Pt is progressing toward current goals. · Response to Treatment:  Pt with decreased L knee stiffness after above exercises.    · Compliance with Program/Exercises: Pt reports compliance with HEP 1-2x daily. Pt encouraged to ice at home. · Recommendations/Intent for next treatment session: \"Next visit will focus on advancements to more challenging activities and reduction in assistance provided\". Progress functional strength training as pt is able.      Variance to POC: NONE    Future Appointments   Date Time Provider [place]   1/25/2019 10:15 AM Octavio Benton, PT [facility]   1/30/2019 10:15 AM Octavio Benton, PT [facility]   2/1/2019 10:15 AM Octaviojuliana Benton, PT [facility]   2/6/2019 10:15 AM Octavio Speed, PT [facility]   2/8/2019 10:15 AM Octaviojuliana Benton, PT [facility]   2/25/2019  9:30 AM Priscilla Veliz MD [facility]   Total Treatment Duration: 40 minutes  PT Patient Time In/Time Out  Time In: 1020  Time Out: [address], PT

## 2019-01-25 ENCOUNTER — HOSPITAL ENCOUNTER (OUTPATIENT)
Dept: PHYSICAL THERAPY | Age: 69
Discharge: HOME | End: 2019-01-25
Payer: MEDICARE

## 2019-01-25 PROCEDURE — 97110 THERAPEUTIC EXERCISES: CPT | Performed by: PHYSICAL THERAPIST

## 2019-01-25 NOTE — PROGRESS NOTES
Lv Owens  : 1950  [place]  Secondary: [address]  [facility], [address], [place]  [phone]   [phone]         OUTPATIENT PHYSICAL THERAPY:Daily Note 2019    ICD-10: Treatment Diagnosis: (M25.562) L knee pain; (M25.662) L knee stiffness; (M62.81) muscle weakness; (R 26.2) Difficulty Walking  Precautions/Allergies:   Lisinopril; Losartan; Statins-hmg-coa reductase inhibitors; and Tape [adhesive]     MD Orders: Evaluate and Treat for L TKA (exercises and HEP) MEDICAL/REFERRING DIAGNOSIS:  Presence of left artificial knee joint [Z96.652]   DATE OF ONSET: 18  REFERRING PHYSICIAN: Ruby Guaman., *  RETURN PHYSICIAN APPOINTMENT: 2019     INITIAL ASSESSMENT:  Mr. Rosalinda Tovar presents today w/ s/s consistent with the diagnosis of L TKA with decreased L knee AROM, strength, balance and gait. His gait is minimally antalgic with a delayed L HS/TO sequencing. He scored a 41% disability rating on the LEFS. Pt will benefit from skilled PT to address the following deficits. PROBLEM LIST (Impacting functional limitations):  1. Decreased Strength  2. Decreased ADL/Functional Activities  3. Decreased Ambulation Ability/Technique  4. Decreased Balance  5. Increased Pain  6. Decreased Flexibility/Joint Mobility  7. Edema/Girth  8. Decreased [place] with Home Exercise Program INTERVENTIONS PLANNED:  1. Balance Exercise  2. Bed Mobility  3. Cold  4. Family Education  5. Gait Training  6. Heat  7. Home Exercise Program (HEP)  8. Manual Therapy  9. Neuromuscular Re-education/Strengthening  10. Range of Motion (ROM)  11. Therapeutic Activites  12. Therapeutic Exercise/Strengthening  13. Ultrasound (US)  14. Kinesiotaping   TREATMENT PLAN:  Effective Dates: 2019 TO 2019 (90 days).  Frequency/Duration: 2 times a week for 90 Days  GOALS: (Goals have been discussed and agreed upon with patient.)  Short-Term Functional Goals: Time Frame: 4-6 weeks (2-25-19)  1. Pt will be compliant and independent with HEP. 2. Pt will improve score on the LEFS to >55/80 to increase pt's overall functional mobility. 3. Pt will improve L knee AROM to 0-120deg to increase pt's ease with transfers and gait. 4. Pt will be able to sit-stand from standard height without use of UE or compensatory weight shifting to rise. 5. Pt will be able to ambulate with a normal gait pattern for community distances with LRD. 6. Pt will subjectively report decreased frequency (1-2x/night) of waking due to L knee pain. 7. Pt will be able to perform SLS x 5 sec on L without LOB to aide with improving strength and balance for gait. Discharge Goals: Time Frame: 8-12 weeks (4-14-19)  1. Pt will improve score on the LEFS to >65/80 to increase pt's overall functional mobility. 2.   Pt will improve L SLS to 8sec to increase pt's balance and control with gait activities. 3.   Pt will improve L knee AROM to 0-125deg to increase pt's ease with transfers, gait and balance. 4.   Pt will be able to sit-stand from toilet height w/out use of UE for assistance to rise. 5.   Pt will be able to ascend 6-8\" steps reciprocally and descend 6\" steps reciprocally with use of a rail with good form and control. 6.   Pt will be able to return to gym activities in the [place] [id number] without c/o pain. 7.   Pt will be DC'd from PT to HEP. Rehabilitation Potential For Stated Goals: Good  Regarding Easterndarrian Ghotra Pratt's therapy, I certify that the treatment plan above will be carried out by a therapist or under their direction. Thank you for this referral,  Santana Carrera, PT                 The information in this section was collected on 1/14/19 (except where otherwise noted). HISTORY:   History of Present Injury/Illness (Reason for Referral):  Pt reports he had a L knee scope in 2001 and a L TKA on 12/5/18.  He's done fairly well with rehab at this point, and was [address] from [facility] on 1/11/19. He states his average pain is a 3-4/10, and he takes 2 Tylenol and ices 2x/day to manage his pain. He is very active around his home, and would like to be able to return to doing yard work, working in his shop, and working out at the [employer]. Past Medical History/Comorbidities:   Mr. Bernadette Castro  has a past medical history of Arthritis, CAD (coronary artery disease), Chest pain, unspecified (8/31/2012), Environmental and seasonal allergies, H/O echocardiogram (02/01/2018), Hyperlipidemia, Hypertension (8/31/2012), Mitral valve regurgitation, NSTEMI (non-ST elevated myocardial infarction) ([facility] [address] (09/02/2012), Post PTCA, Status post left knee replacement (12/5/2018), Varicose veins of both lower extremities (7/6/2016), and Varicose veins of legs. He also has no past medical history of Chronic kidney disease or Stroke ([facility] [address] Mr. Bernadette Castro  has a past surgical history that includes pr cardiac surg procedure unlist (2012); hx knee arthroscopy (Left, 2001); hx other surgical (2003); hx lumbar diskectomy (1988); hx other surgical; and hx colonoscopy. Social History/Living Environment:     Pt reports he lives with his wife in a 2 story home with 3 steps to enter. He has a tub-shower combo, and owns a RW and std cane. Pt without an assistive device today. Prior Level of Function/Work/Activity:  Pt is retired, but very active around his home and in the community. Dominant Side:         RIGHT       Ambulatory/Rehab Services H2 Model Falls Risk Assessment (1/14/19)    Risk Factors:       (1)  Gender [Male] Ability to Rise from Chair:       (1)  Pushes up, successful in one attempt    Falls Prevention Plan:       No modifications necessary   Total: (5 or greater = High Risk): 2    ©2010 [facility]. [facility] States Patent #[id number].  Federal Law prohibits the replication, distribution or use without written permission from [facility]       Current Medications:       Current Outpatient Medications:     chlorpheniramine (CHLOR-TRIMETON) 4 mg tablet, Take 4 mg by mouth every six (6) hours as needed for Allergies. , Disp: , Rfl:     HYDROmorphone (DILAUDID) 2 mg tablet, Take 1 Tab by mouth every four (4) hours as needed. Max Daily Amount: 12 mg., Disp: 40 Tab, Rfl: 0    celecoxib (CELEBREX) 200 mg capsule, Take 200 mg by mouth daily. , Disp: , Rfl:     acetaminophen (TYLENOL) 325 mg tablet, Take 325 mg by mouth every four (4) hours as needed for Pain., Disp: , Rfl:     b complex vitamins tablet, Take 1 Tab by mouth daily. , Disp: , Rfl:     Cetirizine (ZYRTEC) 10 mg cap, Take  by mouth., Disp: , Rfl:     amLODIPine (NORVASC) 5 mg tablet, Take 1 Tab by mouth as needed. (Patient taking differently: Take 5 mg by mouth as needed. For BP > 150/90), Disp: 90 Tab, Rfl: 3    nitroglycerin (NITROSTAT) 0.4 mg SL tablet, 1 Tab by SubLINGual route every five (5) minutes as needed for Chest Pain. (Patient taking differently: 0.4 mg by SubLINGual route every five (5) minutes as needed for Chest Pain. Never have had to use), Disp: 25 Tab, Rfl: 5    magnesium 250 mg tab, Take  by mouth., Disp: , Rfl:    Date Last Reviewed:  1/14/19   EXAMINATION:   Observation/Orthostatic Postural Assessment:          Pt sit/stands with normal posture and in no apparent distress. Min edema noted in L knee. Palpation:          Pt with moderate tenderness with palpation along medial and lateral L joint line of L knee. Moderate swelling still present. Incision is fully healed and pt is performing scar massage daily. Gait: Pt ambulates without an assistive device with delayed L HS/TO sequencing.    Atrophy: pt with atrophy noted in L quad and GS complex compared to the R.   Swelling/Girth (joint line): L: 47.0cm  Incision: Fully healed; no signs of infection  Rolan's Sign: Neg    LE AROM/Strength DATE  1/14/19 DATE     Hip Flexion R:   L:  R:   L:    Hip Abduction  R:   L:  R:   L:    Hip Extension  R:   L:  R:   L:    Knee Flexion  R: WFL; 4/5  L: 110deg; 3/5 R:   L:    Knee Extension  R: WFL; 4/5  L: resting 6deg; Active 3deg; 3/5 R:   L:    Ankle Dorsiflexion  R:   L: R:   L:   Ankle Plantarflexion  R:  L: R:  L:   Flexibility: HS/Quads/GS R:  L:      Special Tests/Function:  Stairs: Pt ascends/descends stairs with a step-to gait pattern. Balance:   Pt with good static balance as observed through activities in the gym today. Dynamic: further testing required. Standing Heel Raises: Able w/ UE support  Sit-stand: Uses B UE, as well as, compensatory weight shifting to the R to rise. Patellar position (static): normal  Patellar tracking:normal  Patellar mobility: hypomobile; pt instructed in patellar mobilizations to begin at home. Body Structures Involved:  1. Nerves  2. Bones  3. Joints  4. Muscles  5. Ligaments Body Functions Affected:  1. Sensory/Pain  2. Neuromusculoskeletal  3. Movement Related Activities and Participation Affected:  1. Mobility  2. Self Care  3. Domestic Life  4. Interpersonal Interactions and Relationships  5. Community, Social and [place]  6. Gym   CLINICAL PRESENTATION:   CLINICAL DECISION MAKING:   Outcome Measure: Tool Used: Lower Extremity Functional Scale (LEFS)  Score:  Initial: 47/80=41% disability Most Recent: X/80 (Date: -- )   Interpretation of Score: 20 questions each scored on a 5 point scale with 0 representing \"extreme difficulty or unable to perform\" and 4 representing \"no difficulty\". The lower the score, the greater the functional disability. 80/80 represents no disability. Minimal detectable change is 9 points. Medical Necessity:   · Patient is expected to demonstrate progress in strength, range of motion and balance to improve gait quality and safety over level and unlevel surfaces so that he can return to his normal daily activities including yardwork and gym.  .            TREATMENT:   (In addition to Assessment/Re-Assessment sessions the following treatments were rendered)  Pre-treatment Symptoms/Complaints:  Pt reports he feels he's doing well, but frustrated at his continued pain. Pt reports he wants to be able to do more outside of his home. Pain: Initial:     5-6/10 Post Session:  3/10      THERAPEUTIC EXERCISE: (45 minutes):  Exercises per grid below to improve mobility and strength. Required moderate visual, verbal and tactile cues to promote proper body alignment, promote proper body posture, promote proper body mechanics and promote proper body breathing techniques. Progressed resistance, range, repetitions and complexity of movement as indicated. MANUAL THERAPY: (0minutes): STM/MFR  To L knee and surrounding musculature to aide with decreasing pain and tightness and improving overall mobility.       Date:  1/14/19 Date:  1/16/19 Date:  1/23/19 Date:  1/25/19   Activity/Exercise Parameters Parameters Parameters    QS 10x      SLR 10x 20x L     SAQ 10x      Bridging w/ add sq 10x      Heel slides w/ ball 10x      Gait in clinic   W/ TAs/GS working on HS/TO seq B and appropriate step-length, 200' 200' working on HS/TO seq   Resisted diagonals    RTB, thighs, 50' x 4L    Resisted side-stepping    RTB, thighs, 50' x 4L RTB, thighs, 50' x 4L   High Knee Marching working on balance and control   In gym, 50' x 6L In gym, 50' x 6L   HS/GS str 3 x 30sec 3 x 30 sec on board 3 x 30 sec on board 3 x 30sec on board   S/L Hip ABD  20x B     Clams  20x B     Stdg SLS     10 x 5 sec B   8\" step-ups/overs    20x   6\" step-ups  30x      Sit-stand from elevated surface working on equal WB B and w/out use of UE  30x 30x On and off blue pad, 30x   Recumbent Stepper  Res 2, 10min Res 3, 10min Res 3, 10min   Education HEP/ICE HEP/ICE HEP/ICE HEP/ICE     Treatment/Session Assessment:  Pt was able to perform 8\" step-overs, but experienced pain and difficulty with 8\" step-downs due to L knee pain, as well as, residual decreased L quad strength and eccentric control. Pt is progressing as anticipated despite his current level of frustration. · Response to Treatment:  Pt with decreased L knee stiffness after above exercises. · Compliance with Program/Exercises: Pt reports compliance with HEP 1-2x daily. Pt encouraged to ice at home. · Recommendations/Intent for next treatment session: \"Next visit will focus on advancements to more challenging activities and reduction in assistance provided\". Progress functional strength training as pt is able.      Variance to POC: NONE    Future Appointments   Date Time Provider [place]   1/30/2019 10:15 AM Ivanna Patino, PT [facility]   2/1/2019 10:15 AM Ivanna Patino, PT [facility]   2/6/2019 10:15 AM [facility]   2/8/2019 10:15 AM [facility]   2/25/2019  9:30 AM Jordi Ochoa MD [facility]   Total Treatment Duration: 45 minutes  PT Patient Time In/Time Out  Time In: 1020  Time Out: [address], PT

## 2019-01-30 ENCOUNTER — HOSPITAL ENCOUNTER (OUTPATIENT)
Dept: PHYSICAL THERAPY | Age: 69
Discharge: HOME | End: 2019-01-30
Payer: MEDICARE

## 2019-01-30 PROCEDURE — 97110 THERAPEUTIC EXERCISES: CPT | Performed by: PHYSICAL THERAPIST

## 2019-01-30 NOTE — PROGRESS NOTES
Bonny Whitney  : 1950  [place]  Secondary: [address] at [facility], [address], [place] [address]  [phone]   [phone]         OUTPATIENT PHYSICAL THERAPY:Daily Note 2019    ICD-10: Treatment Diagnosis: (M25.562) L knee pain; (M25.662) L knee stiffness; (M62.81) muscle weakness; (R 26.2) Difficulty Walking  Precautions/Allergies:   Lisinopril; Losartan; Statins-hmg-coa reductase inhibitors; and Tape [adhesive]     MD Orders: Evaluate and Treat for L TKA (exercises and HEP) MEDICAL/REFERRING DIAGNOSIS:  Presence of left artificial knee joint [Z96.652]   DATE OF ONSET: 18  REFERRING PHYSICIAN: Hina Torres., *  RETURN PHYSICIAN APPOINTMENT: 2019     INITIAL ASSESSMENT:  Mr. Bernadette Castro presents today w/ s/s consistent with the diagnosis of L TKA with decreased L knee AROM, strength, balance and gait. His gait is minimally antalgic with a delayed L HS/TO sequencing. He scored a 41% disability rating on the LEFS. Pt will benefit from skilled PT to address the following deficits. PROBLEM LIST (Impacting functional limitations):  1. Decreased Strength  2. Decreased ADL/Functional Activities  3. Decreased Ambulation Ability/Technique  4. Decreased Balance  5. Increased Pain  6. Decreased Flexibility/Joint Mobility  7. Edema/Girth  8. Decreased [place] with Home Exercise Program INTERVENTIONS PLANNED:  1. Balance Exercise  2. Bed Mobility  3. Cold  4. Family Education  5. Gait Training  6. Heat  7. Home Exercise Program (HEP)  8. Manual Therapy  9. Neuromuscular Re-education/Strengthening  10. Range of Motion (ROM)  11. Therapeutic Activites  12. Therapeutic Exercise/Strengthening  13. Ultrasound (US)  14. Kinesiotaping   TREATMENT PLAN:  Effective Dates: 2019 TO 2019 (90 days).  Frequency/Duration: 2 times a week for 90 Days  GOALS: (Goals have been discussed and agreed upon with patient.)  Short-Term Functional Goals: Time Frame: 4-6 weeks (2-25-19)  1. Pt will be compliant and independent with HEP. 2. Pt will improve score on the LEFS to >55/80 to increase pt's overall functional mobility. 3. Pt will improve L knee AROM to 0-120deg to increase pt's ease with transfers and gait. 4. Pt will be able to sit-stand from standard height without use of UE or compensatory weight shifting to rise. 5. Pt will be able to ambulate with a normal gait pattern for community distances with LRD. 6. Pt will subjectively report decreased frequency (1-2x/night) of waking due to L knee pain. 7. Pt will be able to perform SLS x 5 sec on L without LOB to aide with improving strength and balance for gait. Discharge Goals: Time Frame: 8-12 weeks (4-14-19)  1. Pt will improve score on the LEFS to >65/80 to increase pt's overall functional mobility. 2.   Pt will improve L SLS to 8sec to increase pt's balance and control with gait activities. 3.   Pt will improve L knee AROM to 0-125deg to increase pt's ease with transfers, gait and balance. 4.   Pt will be able to sit-stand from toilet height w/out use of UE for assistance to rise. 5.   Pt will be able to ascend 6-8\" steps reciprocally and descend 6\" steps reciprocally with use of a rail with good form and control. 6.   Pt will be able to return to gym activities in the [place] [address] without c/o pain. 7.   Pt will be DC'd from PT to HEP. Rehabilitation Potential For Stated Goals: Good  Regarding Elmer Pratt's therapy, I certify that the treatment plan above will be carried out by a therapist or under their direction. Thank you for this referral,  Kwadwo Thomas PT                 The information in this section was collected on 1/14/19 (except where otherwise noted). HISTORY:   History of Present Injury/Illness (Reason for Referral):  Pt reports he had a L knee scope in 2001 and a L TKA on 12/5/18.  He's done fairly well with rehab at this point, and was [address] from [facility] PT on 1/11/19. He states his average pain is a 3-4/10, and he takes 2 Tylenol and ices 2x/day to manage his pain. He is very active around his home, and would like to be able to return to doing yard work, working in his shop, and working out at the [employer]. Past Medical History/Comorbidities:   Mr. Tae Reyes  has a past medical history of Arthritis, CAD (coronary artery disease), Chest pain, unspecified (8/31/2012), Environmental and seasonal allergies, H/O echocardiogram (02/01/2018), Hyperlipidemia, Hypertension (8/31/2012), Mitral valve regurgitation, NSTEMI (non-ST elevated myocardial infarction) ([facility] [address] (09/02/2012), Post PTCA, Status post left knee replacement (12/5/2018), Varicose veins of both lower extremities (7/6/2016), and Varicose veins of legs. He also has no past medical history of Chronic kidney disease or Stroke ([facility] [address] Mr. Tae Reyes  has a past surgical history that includes pr cardiac surg procedure unlist (2012); hx knee arthroscopy (Left, 2001); hx other surgical (2003); hx lumbar diskectomy (1988); hx other surgical; and hx colonoscopy. Social History/Living Environment:     Pt reports he lives with his wife in a 2 story home with 3 steps to enter. He has a tub-shower combo, and owns a RW and std cane. Pt without an assistive device today. Prior Level of Function/Work/Activity:  Pt is retired, but very active around his home and in the community. Dominant Side:         RIGHT       Ambulatory/Rehab Services H2 Model Falls Risk Assessment (1/14/19)    Risk Factors:       (1)  Gender [Male] Ability to Rise from Chair:       (1)  Pushes up, successful in one attempt    Falls Prevention Plan:       No modifications necessary   Total: (5 or greater = High Risk): 2    ©2010 [facility] States Patent #[id number],448.  Federal Law prohibits the replication, distribution or use without written permission from [facility]       Current Medications:       Current Outpatient Medications:     chlorpheniramine (CHLOR-TRIMETON) 4 mg tablet, Take 4 mg by mouth every six (6) hours as needed for Allergies. , Disp: , Rfl:     HYDROmorphone (DILAUDID) 2 mg tablet, Take 1 Tab by mouth every four (4) hours as needed. Max Daily Amount: 12 mg., Disp: 40 Tab, Rfl: 0    celecoxib (CELEBREX) 200 mg capsule, Take 200 mg by mouth daily. , Disp: , Rfl:     acetaminophen (TYLENOL) 325 mg tablet, Take 325 mg by mouth every four (4) hours as needed for Pain., Disp: , Rfl:     b complex vitamins tablet, Take 1 Tab by mouth daily. , Disp: , Rfl:     Cetirizine (ZYRTEC) 10 mg cap, Take  by mouth., Disp: , Rfl:     amLODIPine (NORVASC) 5 mg tablet, Take 1 Tab by mouth as needed. (Patient taking differently: Take 5 mg by mouth as needed. For BP > 150/90), Disp: 90 Tab, Rfl: 3    nitroglycerin (NITROSTAT) 0.4 mg SL tablet, 1 Tab by SubLINGual route every five (5) minutes as needed for Chest Pain. (Patient taking differently: 0.4 mg by SubLINGual route every five (5) minutes as needed for Chest Pain. Never have had to use), Disp: 25 Tab, Rfl: 5    magnesium 250 mg tab, Take  by mouth., Disp: , Rfl:    Date Last Reviewed:  1/14/19   EXAMINATION:   Observation/Orthostatic Postural Assessment:          Pt sit/stands with normal posture and in no apparent distress. Min edema noted in L knee. Palpation:          Pt with moderate tenderness with palpation along medial and lateral L joint line of L knee. Moderate swelling still present. Incision is fully healed and pt is performing scar massage daily. Gait: Pt ambulates without an assistive device with delayed L HS/TO sequencing.    Atrophy: pt with atrophy noted in L quad and GS complex compared to the R.   Swelling/Girth (joint line): L: 47.0cm  Incision: Fully healed; no signs of infection  Rolan's Sign: Neg    LE AROM/Strength DATE  1/14/19 DATE     Hip Flexion R:   L:  R:   L:    Hip Abduction  R:   L:  R:   L:    Hip Extension  R:   L:  R:   L:    Knee Flexion  R: WFL; 4/5  L: 110deg; 3/5 R:   L:    Knee Extension  R: WFL; 4/5  L: resting 6deg; Active 3deg; 3/5 R:   L:    Ankle Dorsiflexion  R:   L: R:   L:   Ankle Plantarflexion  R:  L: R:  L:   Flexibility: HS/Quads/GS R:  L:      Special Tests/Function:  Stairs: Pt ascends/descends stairs with a step-to gait pattern. Balance:   Pt with good static balance as observed through activities in the gym today. Dynamic: further testing required. Standing Heel Raises: Able w/ UE support  Sit-stand: Uses B UE, as well as, compensatory weight shifting to the R to rise. Patellar position (static): normal  Patellar tracking:normal  Patellar mobility: hypomobile; pt instructed in patellar mobilizations to begin at home. Body Structures Involved:  1. Nerves  2. Bones  3. Joints  4. Muscles  5. Ligaments Body Functions Affected:  1. Sensory/Pain  2. Neuromusculoskeletal  3. Movement Related Activities and Participation Affected:  1. Mobility  2. Self Care  3. Domestic Life  4. Interpersonal Interactions and Relationships  5. Community, Social and [place]  6. Gym   CLINICAL PRESENTATION:   CLINICAL DECISION MAKING:   Outcome Measure: Tool Used: Lower Extremity Functional Scale (LEFS)  Score:  Initial: 47/80=41% disability Most Recent: X/80 (Date: -- )   Interpretation of Score: 20 questions each scored on a 5 point scale with 0 representing \"extreme difficulty or unable to perform\" and 4 representing \"no difficulty\". The lower the score, the greater the functional disability. 80/80 represents no disability. Minimal detectable change is 9 points. Medical Necessity:   · Patient is expected to demonstrate progress in strength, range of motion and balance to improve gait quality and safety over level and unlevel surfaces so that he can return to his normal daily activities including yardwork and gym.  .            TREATMENT:   (In addition to Assessment/Re-Assessment sessions the following treatments were rendered)  Pre-treatment Symptoms/Complaints:  Pt reports he's feeling much better today, despite the cold weather. He states he's getting stronger. Pain: Initial:     4-5/10 Post Session:  3/10      THERAPEUTIC EXERCISE: (45 minutes):  Exercises per grid below to improve mobility and strength. Required moderate visual, verbal and tactile cues to promote proper body alignment, promote proper body posture, promote proper body mechanics and promote proper body breathing techniques. Progressed resistance, range, repetitions and complexity of movement as indicated. MANUAL THERAPY: (0minutes): STM/MFR  To L knee and surrounding musculature to aide with decreasing pain and tightness and improving overall mobility.       Date:  1/14/19 Date:  1/16/19 Date:  1/23/19 Date:  1/25/19 Date:  1/30/19   Activity/Exercise Parameters Parameters Parameters     QS 10x       SLR 10x 20x L      SAQ 10x       Bridging w/ add sq 10x       Heel slides w/ ball 10x       Gait in clinic   W/ TAs/GS working on HS/TO seq B and appropriate step-length, 200' 200' working on HS/TO seq 300' in [address] [place] [address]; no AD   Resisted diagonals    RTB, thighs, 50' x 4L     Resisted side-stepping    RTB, thighs, 50' x 4L RTB, thighs, 50' x 4L    High Knee Marching working on balance and control   In gym, 50' x 6L In gym, 50' x 6L    HS/GS str 3 x 30sec 3 x 30 sec on board 3 x 30 sec on board 3 x 30sec on board 5 x 30sec   S/L Hip ABD  20x B      Clams  20x B      Stdg SLS     10 x 5 sec B    8\" step-ups/overs    20x 8\" stairs, 2 flights   6\" step-ups  30x               Leg Press     85#, 30x   Knee Ext     L, 10#, 30x   HSC     35#, 30x   Sit-stand from elevated surface working on equal WB B and w/out use of UE  30x 30x On and off blue pad, 30x 30x   Recumbent Stepper  Res 2, 10min Res 3, 10min Res 3, 10min Res 3, 10min   Education HEP/ICE HEP/ICE HEP/ICE HEP/ICE HEP/ICE     L knee AROM: 0-118deg (1-30-19)  Treatment/Session Assessment:  Pt's L knee AROM continues to improve as noted above. He did well with addition of weight machines in the [address] with no increased c/o L knee pain. Pt with much improved safety and control with 8\" stairs. Pt is progressing toward current goals. · Response to Treatment:  Pt with decreased L knee stiffness after above exercises. · Compliance with Program/Exercises: Pt reports compliance with HEP 1-2x daily. Pt encouraged to ice at home. · Recommendations/Intent for next treatment session: \"Next visit will focus on advancements to more challenging activities and reduction in assistance provided\". Progress functional strength training as pt is able.      Variance to POC: NONE    Future Appointments   Date Time Provider [place]   2/1/2019 10:15 AM Lord Allison, MURALI OROURKE [facility]   2/6/2019 10:15 AM Lord Allison PT [facility]   2/8/2019 10:15 AM [facility]   2/25/2019  9:30 AM Kiet Leiva MD [facility]   Total Treatment Duration: 45 minutes  PT Patient Time In/Time Out  Time In: 1015  Time Out: [address] [place] [facility]

## 2019-02-01 ENCOUNTER — HOSPITAL ENCOUNTER (OUTPATIENT)
Dept: PHYSICAL THERAPY | Age: 69
Discharge: HOME | End: 2019-02-01
Payer: MEDICARE

## 2019-02-01 PROCEDURE — 97110 THERAPEUTIC EXERCISES: CPT | Performed by: PHYSICAL THERAPIST

## 2019-02-01 NOTE — PROGRESS NOTES
Asa Fair  : 1950  [place]  Secondary: [address]  [place] , [address], [place]  [phone]   [phone]         OUTPATIENT PHYSICAL THERAPY:Daily Note 2019    ICD-10: Treatment Diagnosis: (M25.562) L knee pain; (M25.662) L knee stiffness; (M62.81) muscle weakness; (R 26.2) Difficulty Walking  Precautions/Allergies:   Lisinopril; Losartan; Statins-hmg-coa reductase inhibitors; and Tape [adhesive]     MD Orders: Evaluate and Treat for L TKA (exercises and HEP) MEDICAL/REFERRING DIAGNOSIS:  Presence of left artificial knee joint [Z96.652]   DATE OF ONSET: 18  REFERRING PHYSICIAN: Bernice Hardy., *  RETURN PHYSICIAN APPOINTMENT: 2019     INITIAL ASSESSMENT:  Mr. Becka Stahl presents today w/ s/s consistent with the diagnosis of L TKA with decreased L knee AROM, strength, balance and gait. His gait is minimally antalgic with a delayed L HS/TO sequencing. He scored a 41% disability rating on the LEFS. Pt will benefit from skilled PT to address the following deficits. PROBLEM LIST (Impacting functional limitations):  1. Decreased Strength  2. Decreased ADL/Functional Activities  3. Decreased Ambulation Ability/Technique  4. Decreased Balance  5. Increased Pain  6. Decreased Flexibility/Joint Mobility  7. Edema/Girth  8. Decreased [place] with Home Exercise Program INTERVENTIONS PLANNED:  1. Balance Exercise  2. Bed Mobility  3. Cold  4. Family Education  5. Gait Training  6. Heat  7. Home Exercise Program (HEP)  8. Manual Therapy  9. Neuromuscular Re-education/Strengthening  10. Range of Motion (ROM)  11. Therapeutic Activites  12. Therapeutic Exercise/Strengthening  13. Ultrasound (US)  14. Kinesiotaping   TREATMENT PLAN:  Effective Dates: 2019 TO 2019 (90 days).  Frequency/Duration: 2 times a week for 90 Days  GOALS: (Goals have been discussed and agreed upon with patient.)  Short-Term Functional Goals: Time Frame: 4-6 weeks (2-25-19)  1. Pt will be compliant and independent with HEP. 2. Pt will improve score on the LEFS to >55/80 to increase pt's overall functional mobility. 3. Pt will improve L knee AROM to 0-120deg to increase pt's ease with transfers and gait. 4. Pt will be able to sit-stand from standard height without use of UE or compensatory weight shifting to rise. 5. Pt will be able to ambulate with a normal gait pattern for community distances with LRD. 6. Pt will subjectively report decreased frequency (1-2x/night) of waking due to L knee pain. 7. Pt will be able to perform SLS x 5 sec on L without LOB to aide with improving strength and balance for gait. Discharge Goals: Time Frame: 8-12 weeks (4-14-19)  1. Pt will improve score on the LEFS to >65/80 to increase pt's overall functional mobility. 2.   Pt will improve L SLS to 8sec to increase pt's balance and control with gait activities. 3.   Pt will improve L knee AROM to 0-125deg to increase pt's ease with transfers, gait and balance. 4.   Pt will be able to sit-stand from toilet height w/out use of UE for assistance to rise. 5.   Pt will be able to ascend 6-8\" steps reciprocally and descend 6\" steps reciprocally with use of a rail with good form and control. 6.   Pt will be able to return to gym activities in the [place] [address] without c/o pain. 7.   Pt will be DC'd from PT to HEP. Rehabilitation Potential For Stated Goals: Good  Regarding Parish Pratt's therapy, I certify that the treatment plan above will be carried out by a therapist or under their direction. Thank you for this referral,  Fransisca Perez PT                 The information in this section was collected on 1/14/19 (except where otherwise noted). HISTORY:   History of Present Injury/Illness (Reason for Referral):  Pt reports he had a L knee scope in 2001 and a L TKA on 12/5/18.  He's done fairly well with rehab at this point, and was [address] from [facility] on 1/11/19. He states his average pain is a 3-4/10, and he takes 2 Tylenol and ices 2x/day to manage his pain. He is very active around his home, and would like to be able to return to doing yard work, working in his shop, and working out at the [employer]. Past Medical History/Comorbidities:   Mr. Leandra Jean  has a past medical history of Arthritis, CAD (coronary artery disease), Chest pain, unspecified (8/31/2012), Environmental and seasonal allergies, H/O echocardiogram (02/01/2018), Hyperlipidemia, Hypertension (8/31/2012), Mitral valve regurgitation, NSTEMI (non-ST elevated myocardial infarction) ([facility] (09/02/2012), Post PTCA, Status post left knee replacement (12/5/2018), Varicose veins of both lower extremities (7/6/2016), and Varicose veins of legs. He also has no past medical history of Chronic kidney disease or Stroke ([facility] Mr. Leandra Jean  has a past surgical history that includes pr cardiac surg procedure unlist (2012); hx knee arthroscopy (Left, 2001); hx other surgical (2003); hx lumbar diskectomy (1988); hx other surgical; and hx colonoscopy. Social History/Living Environment:     Pt reports he lives with his wife in a 2 story home with 3 steps to enter. He has a tub-shower combo, and owns a RW and std cane. Pt without an assistive device today. Prior Level of Function/Work/Activity:  Pt is retired, but very active around his home and in the community. Dominant Side:         RIGHT       Ambulatory/Rehab Services H2 Model Falls Risk Assessment (1/14/19)    Risk Factors:       (1)  Gender [Male] Ability to Rise from Chair:       (1)  Pushes up, successful in one attempt    Falls Prevention Plan:       No modifications necessary   Total: (5 or greater = High Risk): 2    ©2010 [facility]. [facility] States Patent #[id number].  Federal Law prohibits the replication, distribution or use without written permission from [facility]       Current Medications:       Current Outpatient Medications:     chlorpheniramine (CHLOR-TRIMETON) 4 mg tablet, Take 4 mg by mouth every six (6) hours as needed for Allergies. , Disp: , Rfl:     HYDROmorphone (DILAUDID) 2 mg tablet, Take 1 Tab by mouth every four (4) hours as needed. Max Daily Amount: 12 mg., Disp: 40 Tab, Rfl: 0    celecoxib (CELEBREX) 200 mg capsule, Take 200 mg by mouth daily. , Disp: , Rfl:     acetaminophen (TYLENOL) 325 mg tablet, Take 325 mg by mouth every four (4) hours as needed for Pain., Disp: , Rfl:     b complex vitamins tablet, Take 1 Tab by mouth daily. , Disp: , Rfl:     Cetirizine (ZYRTEC) 10 mg cap, Take  by mouth., Disp: , Rfl:     amLODIPine (NORVASC) 5 mg tablet, Take 1 Tab by mouth as needed. (Patient taking differently: Take 5 mg by mouth as needed. For BP > 150/90), Disp: 90 Tab, Rfl: 3    nitroglycerin (NITROSTAT) 0.4 mg SL tablet, 1 Tab by SubLINGual route every five (5) minutes as needed for Chest Pain. (Patient taking differently: 0.4 mg by SubLINGual route every five (5) minutes as needed for Chest Pain. Never have had to use), Disp: 25 Tab, Rfl: 5    magnesium 250 mg tab, Take  by mouth., Disp: , Rfl:    Date Last Reviewed:  1/14/19   EXAMINATION:   Observation/Orthostatic Postural Assessment:          Pt sit/stands with normal posture and in no apparent distress. Min edema noted in L knee. Palpation:          Pt with moderate tenderness with palpation along medial and lateral L joint line of L knee. Moderate swelling still present. Incision is fully healed and pt is performing scar massage daily. Gait: Pt ambulates without an assistive device with delayed L HS/TO sequencing.    Atrophy: pt with atrophy noted in L quad and GS complex compared to the R.   Swelling/Girth (joint line): L: 47.0cm  Incision: Fully healed; no signs of infection  Rolan's Sign: Neg    LE AROM/Strength DATE  1/14/19 DATE     Hip Flexion R:   L:  R:   L:    Hip Abduction  R:   L:  R:   L:    Hip Extension  R:   L:  R:   L:    Knee Flexion  R: WFL; 4/5  L: 110deg; 3/5 R:   L:    Knee Extension  R: WFL; 4/5  L: resting 6deg; Active 3deg; 3/5 R:   L:    Ankle Dorsiflexion  R:   L: R:   L:   Ankle Plantarflexion  R:  L: R:  L:   Flexibility: HS/Quads/GS R:  L:      Special Tests/Function:  Stairs: Pt ascends/descends stairs with a step-to gait pattern. Balance:   Pt with good static balance as observed through activities in the gym today. Dynamic: further testing required. Standing Heel Raises: Able w/ UE support  Sit-stand: Uses B UE, as well as, compensatory weight shifting to the R to rise. Patellar position (static): normal  Patellar tracking:normal  Patellar mobility: hypomobile; pt instructed in patellar mobilizations to begin at home. Body Structures Involved:  1. Nerves  2. Bones  3. Joints  4. Muscles  5. Ligaments Body Functions Affected:  1. Sensory/Pain  2. Neuromusculoskeletal  3. Movement Related Activities and Participation Affected:  1. Mobility  2. Self Care  3. Domestic Life  4. Interpersonal Interactions and Relationships  5. Community, Social and [place]  6. Gym   CLINICAL PRESENTATION:   CLINICAL DECISION MAKING:   Outcome Measure: Tool Used: Lower Extremity Functional Scale (LEFS)  Score:  Initial: 47/80=41% disability Most Recent: X/80 (Date: -- )   Interpretation of Score: 20 questions each scored on a 5 point scale with 0 representing \"extreme difficulty or unable to perform\" and 4 representing \"no difficulty\". The lower the score, the greater the functional disability. 80/80 represents no disability. Minimal detectable change is 9 points. Medical Necessity:   · Patient is expected to demonstrate progress in strength, range of motion and balance to improve gait quality and safety over level and unlevel surfaces so that he can return to his normal daily activities including yardwork and gym.  .            TREATMENT:   (In addition to Assessment/Re-Assessment sessions the following treatments were rendered)  Pre-treatment Symptoms/Complaints:  Pt reports increased L knee stiffness today due to the cold weather, as well as,  straining getting on and off his truck's tailgate yesterday. Pain: Initial:     4-5/10 Post Session:  3/10      THERAPEUTIC EXERCISE: (45 minutes):  Exercises per grid below to improve mobility and strength. Required moderate visual, verbal and tactile cues to promote proper body alignment, promote proper body posture, promote proper body mechanics and promote proper body breathing techniques. Progressed resistance, range, repetitions and complexity of movement as indicated. MANUAL THERAPY: (0minutes): STM/MFR  To L knee and surrounding musculature to aide with decreasing pain and tightness and improving overall mobility.       Date:  1/14/19 Date:  1/16/19 Date:  1/23/19 Date:  1/25/19 Date:  1/30/19 Date:  2/1/19   Activity/Exercise Parameters Parameters Parameters      QS 10x        SLR 10x 20x L       SAQ 10x        Bridging w/ add sq 10x        Heel slides w/ ball 10x        Gait in clinic   W/ TAs/GS working on HS/TO seq B and appropriate step-length, 200' 200' working on HS/TO seq 300' in [address] [place] [address]; no ' in HSG; no AD   Resisted diagonals    RTB, thighs, 50' x 4L      Resisted side-stepping    RTB, thighs, 50' x 4L RTB, thighs, 50' x 4L     High Knee Marching working on balance and control   In gym, 50' x 6L In gym, 48' x 6L  In gym, 50' x 6L   HS/GS str 3 x 30sec 3 x 30 sec on board 3 x 30 sec on board 3 x 30sec on board 5 x 30sec 5 x 30sec   S/L Hip ABD  20x B       Clams  20x B       Stdg SLS     10 x 5 sec B     8\" step-ups/overs    20x 8\" stairs, 2 flights    6\" step-ups  30x                 Leg Press     85#, 30x 85#, 30x   Knee Ext     L, 10#, 30x 25#, 30x   HSC     35#, 30x 45#, 30x   Sit-stand from elevated surface working on equal WB B and w/out use of UE  30x 30x On and off blue pad, 30x 30x On and off blue pad, 30x   Recumbent Stepper  Res 2, 10min Res 3, 10min Res 3, 10min Res 3, 10min Res 3, 10min   Education HEP/ICE HEP/ICE HEP/ICE HEP/ICE HEP/ICE HEP/ICE     L knee AROM: 0-118deg (1-30-19)  Treatment/Session Assessment:  Pt's L knee strength continues to improve as noted with his ability to tolerate increased weight on machines without increased c/o pain. Pt was challenged by step-over activity when L LE is the stance leg due to residual proprioception issues. Pt is progressing toward current goals. · Response to Treatment:  Pt with decreased L knee stiffness after above exercises. · Compliance with Program/Exercises: Pt reports compliance with HEP 1-2x daily. Pt encouraged to ice at home. · Recommendations/Intent for next treatment session: \"Next visit will focus on advancements to more challenging activities and reduction in assistance provided\". Progress functional strength training as pt is able.      Variance to POC: NONE    Future Appointments   Date Time Provider [place]   [facility]2019 [facility]   2/8/2019 10:15 AM MURALI Burger   2/25/2019  9:30 AM Lupe Mendoza MD [facility]   Total Treatment Duration: 45 minutes  PT Patient Time In/Time Out  Time In: 1025  Time Out: [facility]

## 2019-02-06 ENCOUNTER — HOSPITAL ENCOUNTER (OUTPATIENT)
Dept: PHYSICAL THERAPY | Age: 69
Discharge: HOME | End: 2019-02-06
Payer: MEDICARE

## 2019-02-06 PROCEDURE — 97110 THERAPEUTIC EXERCISES: CPT | Performed by: PHYSICAL THERAPIST

## 2019-02-06 NOTE — PROGRESS NOTES
[place]  : 1950  [place]  Secondary: [address]  [place] , [address], [place]  [phone]   [phone]         OUTPATIENT PHYSICAL THERAPY:Daily Note 2019    ICD-10: Treatment Diagnosis: (M25.562) L knee pain; (M25.662) L knee stiffness; (M62.81) muscle weakness; (R 26.2) Difficulty Walking  Precautions/Allergies:   Lisinopril; Losartan; Statins-hmg-coa reductase inhibitors; and Tape [adhesive]     MD Orders: Evaluate and Treat for L TKA (exercises and HEP) MEDICAL/REFERRING DIAGNOSIS:  Presence of left artificial knee joint [Z96.652]   DATE OF ONSET: 18  REFERRING PHYSICIAN: Dasha Iyer., *  RETURN PHYSICIAN APPOINTMENT: 2019     INITIAL ASSESSMENT:  Mr. Lucy Kim presents today w/ s/s consistent with the diagnosis of L TKA with decreased L knee AROM, strength, balance and gait. His gait is minimally antalgic with a delayed L HS/TO sequencing. He scored a 41% disability rating on the LEFS. Pt will benefit from skilled PT to address the following deficits. PROBLEM LIST (Impacting functional limitations):  1. Decreased Strength  2. Decreased ADL/Functional Activities  3. Decreased Ambulation Ability/Technique  4. Decreased Balance  5. Increased Pain  6. Decreased Flexibility/Joint Mobility  7. Edema/Girth  8. Decreased [place] with Home Exercise Program INTERVENTIONS PLANNED:  1. Balance Exercise  2. Bed Mobility  3. Cold  4. Family Education  5. Gait Training  6. Heat  7. Home Exercise Program (HEP)  8. Manual Therapy  9. Neuromuscular Re-education/Strengthening  10. Range of Motion (ROM)  11. Therapeutic Activites  12. Therapeutic Exercise/Strengthening  13. Ultrasound (US)  14. Kinesiotaping   TREATMENT PLAN:  Effective Dates: 2019 TO 2019 (90 days).  Frequency/Duration: 2 times a week for 90 Days  GOALS: (Goals have been discussed and agreed upon with patient.)  Short-Term Functional Goals: Time Frame: 4-6 weeks (2-25-19)  1. Pt will be compliant and independent with HEP. 2. Pt will improve score on the LEFS to >55/80 to increase pt's overall functional mobility. 3. Pt will improve L knee AROM to 0-120deg to increase pt's ease with transfers and gait. 4. Pt will be able to sit-stand from standard height without use of UE or compensatory weight shifting to rise. 5. Pt will be able to ambulate with a normal gait pattern for community distances with LRD. 6. Pt will subjectively report decreased frequency (1-2x/night) of waking due to L knee pain. 7. Pt will be able to perform SLS x 5 sec on L without LOB to aide with improving strength and balance for gait. Discharge Goals: Time Frame: 8-12 weeks (4-14-19)  1. Pt will improve score on the LEFS to >65/80 to increase pt's overall functional mobility. 2.   Pt will improve L SLS to 8sec to increase pt's balance and control with gait activities. 3.   Pt will improve L knee AROM to 0-125deg to increase pt's ease with transfers, gait and balance. 4.   Pt will be able to sit-stand from toilet height w/out use of UE for assistance to rise. 5.   Pt will be able to ascend 6-8\" steps reciprocally and descend 6\" steps reciprocally with use of a rail with good form and control. 6.   Pt will be able to return to gym activities in the [place] [address] without c/o pain. 7.   Pt will be DC'd from PT to HEP. Rehabilitation Potential For Stated Goals: Good  Regarding Negro Noejosefa Pratt's therapy, I certify that the treatment plan above will be carried out by a therapist or under their direction. Thank you for this referral,  Mirtha Mosquera, PT                 The information in this section was collected on 1/14/19 (except where otherwise noted). HISTORY:   History of Present Injury/Illness (Reason for Referral):  Pt reports he had a L knee scope in 2001 and a L TKA on 12/5/18.  He's done fairly well with rehab at this point, and was [address] from [facility] PT on 1/11/19. He states his average pain is a 3-4/10, and he takes 2 Tylenol and ices 2x/day to manage his pain. He is very active around his home, and would like to be able to return to doing yard work, working in his shop, and working out at the [employer]. Past Medical History/Comorbidities:   Mr. Lynnette Berry  has a past medical history of Arthritis, CAD (coronary artery disease), Chest pain, unspecified (8/31/2012), Environmental and seasonal allergies, H/O echocardiogram (02/01/2018), Hyperlipidemia, Hypertension (8/31/2012), Mitral valve regurgitation, NSTEMI (non-ST elevated myocardial infarction) ([facility] (09/02/2012), Post PTCA, Status post left knee replacement (12/5/2018), Varicose veins of both lower extremities (7/6/2016), and Varicose veins of legs. He also has no past medical history of Chronic kidney disease or Stroke ([facility] Mr. Lynnette Berry  has a past surgical history that includes pr cardiac surg procedure unlist (2012); hx knee arthroscopy (Left, 2001); hx other surgical (2003); hx lumbar diskectomy (1988); hx other surgical; and hx colonoscopy. Social History/Living Environment:     Pt reports he lives with his wife in a 2 story home with 3 steps to enter. He has a tub-shower combo, and owns a RW and std cane. Pt without an assistive device today. Prior Level of Function/Work/Activity:  Pt is retired, but very active around his home and in the community. Dominant Side:         RIGHT       Ambulatory/Rehab Services H2 Model Falls Risk Assessment (1/14/19)    Risk Factors:       (1)  Gender [Male] Ability to Rise from Chair:       (1)  Pushes up, successful in one attempt    Falls Prevention Plan:       No modifications necessary   Total: (5 or greater = High Risk): 2    ©2010 [facility]. [facility] States Patent #[id number].  Federal Law prohibits the replication, distribution or use without written permission from [facility]       Current Medications:       Current Outpatient Medications:     chlorpheniramine (CHLOR-TRIMETON) 4 mg tablet, Take 4 mg by mouth every six (6) hours as needed for Allergies. , Disp: , Rfl:     HYDROmorphone (DILAUDID) 2 mg tablet, Take 1 Tab by mouth every four (4) hours as needed. Max Daily Amount: 12 mg., Disp: 40 Tab, Rfl: 0    celecoxib (CELEBREX) 200 mg capsule, Take 200 mg by mouth daily. , Disp: , Rfl:     acetaminophen (TYLENOL) 325 mg tablet, Take 325 mg by mouth every four (4) hours as needed for Pain., Disp: , Rfl:     b complex vitamins tablet, Take 1 Tab by mouth daily. , Disp: , Rfl:     Cetirizine (ZYRTEC) 10 mg cap, Take  by mouth., Disp: , Rfl:     amLODIPine (NORVASC) 5 mg tablet, Take 1 Tab by mouth as needed. (Patient taking differently: Take 5 mg by mouth as needed. For BP > 150/90), Disp: 90 Tab, Rfl: 3    nitroglycerin (NITROSTAT) 0.4 mg SL tablet, 1 Tab by SubLINGual route every five (5) minutes as needed for Chest Pain. (Patient taking differently: 0.4 mg by SubLINGual route every five (5) minutes as needed for Chest Pain. Never have had to use), Disp: 25 Tab, Rfl: 5    magnesium 250 mg tab, Take  by mouth., Disp: , Rfl:    Date Last Reviewed:  1/14/19   EXAMINATION:   Observation/Orthostatic Postural Assessment:          Pt sit/stands with normal posture and in no apparent distress. Min edema noted in L knee. Palpation:          Pt with moderate tenderness with palpation along medial and lateral L joint line of L knee. Moderate swelling still present. Incision is fully healed and pt is performing scar massage daily. Gait: Pt ambulates without an assistive device with delayed L HS/TO sequencing.    Atrophy: pt with atrophy noted in L quad and GS complex compared to the R.   Swelling/Girth (joint line): L: 47.0cm  Incision: Fully healed; no signs of infection  Rolan's Sign: Neg    LE AROM/Strength DATE  1/14/19 DATE     Hip Flexion R:   L:  R:   L:    Hip Abduction  R:   L:  R:   L:    Hip Extension  R:   L:  R:   L:    Knee Flexion  R: WFL; 4/5  L: 110deg; 3/5 R:   L:    Knee Extension  R: WFL; 4/5  L: resting 6deg; Active 3deg; 3/5 R:   L:    Ankle Dorsiflexion  R:   L: R:   L:   Ankle Plantarflexion  R:  L: R:  L:   Flexibility: HS/Quads/GS R:  L:      Special Tests/Function:  Stairs: Pt ascends/descends stairs with a step-to gait pattern. Balance:   Pt with good static balance as observed through activities in the gym today. Dynamic: further testing required. Standing Heel Raises: Able w/ UE support  Sit-stand: Uses B UE, as well as, compensatory weight shifting to the R to rise. Patellar position (static): normal  Patellar tracking:normal  Patellar mobility: hypomobile; pt instructed in patellar mobilizations to begin at home. Body Structures Involved:  1. Nerves  2. Bones  3. Joints  4. Muscles  5. Ligaments Body Functions Affected:  1. Sensory/Pain  2. Neuromusculoskeletal  3. Movement Related Activities and Participation Affected:  1. Mobility  2. Self Care  3. Domestic Life  4. Interpersonal Interactions and Relationships  5. Community, Social and [place]  6. Gym   CLINICAL PRESENTATION:   CLINICAL DECISION MAKING:   Outcome Measure: Tool Used: Lower Extremity Functional Scale (LEFS)  Score:  Initial: 47/80=41% disability Most Recent: X/80 (Date: -- )   Interpretation of Score: 20 questions each scored on a 5 point scale with 0 representing \"extreme difficulty or unable to perform\" and 4 representing \"no difficulty\". The lower the score, the greater the functional disability. 80/80 represents no disability. Minimal detectable change is 9 points. Medical Necessity:   · Patient is expected to demonstrate progress in strength, range of motion and balance to improve gait quality and safety over level and unlevel surfaces so that he can return to his normal daily activities including yardwork and gym.  .            TREATMENT:   (In addition to Assessment/Re-Assessment sessions the following treatments were rendered)  Pre-treatment Symptoms/Complaints:  Pt reports he's doing well, and feels he's ready to graduate on Friday. Pain: Initial:     3/10 Post Session:  2/10      THERAPEUTIC EXERCISE: (45 minutes):  Exercises per grid below to improve mobility and strength. Required moderate visual, verbal and tactile cues to promote proper body alignment, promote proper body posture, promote proper body mechanics and promote proper body breathing techniques. Progressed resistance, range, repetitions and complexity of movement as indicated. MANUAL THERAPY: (0minutes): STM/MFR  To L knee and surrounding musculature to aide with decreasing pain and tightness and improving overall mobility.       Date:  1/14/19 Date:  1/16/19 Date:  1/23/19 Date:  1/25/19 Date:  1/30/19 Date:  2/1/19 Date:  2/6/19   Activity/Exercise Parameters Parameters Parameters       QS 10x         SLR 10x 20x L        SAQ 10x         Bridging w/ add sq 10x         Heel slides w/ ball 10x         Gait in clinic   W/ TAs/GS working on HS/TO seq B and appropriate step-length, 200' 200' working on HS/TO seq 300' in [address] [place] [address]; no ' in HSG; no ' in HSG; no AD   Resisted diagonals    RTB, thighs, 50' x 4L       Resisted side-stepping    RTB, thighs, 50' x 4L RTB, thighs, 50' x 4L      High Knee Marching working on balance and control   In gym, 50' x 6L In gym, 48' x 6L  In gym, 48' x 6L In gym, 50' x 6L   HS/GS str 3 x 30sec 3 x 30 sec on board 3 x 30 sec on board 3 x 30sec on board 5 x 30sec 5 x 30sec 5 x 30sec   S/L Hip ABD  20x B        Clams  20x B        Stdg SLS     10 x 5 sec B      8\" step-ups/overs    20x 8\" stairs, 2 flights     6\" step-ups  30x         6\" step-overs forward and side for obstacle negotiation       20x B each   Heel-to-toe walking forw/back       50' x 4L   Shuttle Press       125#, 30x   Leg Press     85#, 30x 85#, 30x    Knee Ext     L, 10#, 30x 25#, 30x    HSC     35#, 30x 45#, 30x    Practice kneeling on chair       10x   Sit-stand from elevated surface working on equal WB B and w/out use of UE  30x 30x On and off blue pad, 30x 30x On and off blue pad, 30x On and off blue pad, 40x    Recumbent Stepper  Res 2, 10min Res 3, 10min Res 3, 10min Res 3, 10min Res 3, 10min Res 4, 10min   Education HEP/ICE HEP/ICE HEP/ICE HEP/ICE HEP/ICE HEP/ICE HEP/ICE     L knee AROM: 0-118deg (1-30-19)  Treatment/Session Assessment: Pt with significantly improved balance and control with above dynamic activities. Pt with no LOB with any today. He still c/o min L knee pain with sit-stand on balance pad, but that too is improving. Pt is progressing toward current goals. · Response to Treatment:  Pt with decreased L knee stiffness after above exercises. · Compliance with Program/Exercises: Pt reports compliance with HEP 1-2x daily. Pt encouraged to ice at home. · Recommendations/Intent for next treatment session: \"Next visit will focus on advancements to more challenging activities and reduction in assistance provided\". Progress functional strength training as pt is able. Pt given LEFS to fill out and return at next visit. Re-assess and likely DC at next visit.      Variance to POC: NONE    Future Appointments   Date Time Provider [place]   2/8/2019  2:30 PM MURALI Johnston   2/25/2019  9:30 AM Mio Harper MD [facility]   Total Treatment Duration: 45 minutes  PT Patient Time In/Time Out  Time In: 1015  Time Out: [address] [place] [facility]

## 2019-02-08 ENCOUNTER — HOSPITAL ENCOUNTER (OUTPATIENT)
Dept: PHYSICAL THERAPY | Age: 69
Discharge: HOME | End: 2019-02-08
Payer: MEDICARE

## 2019-02-08 PROCEDURE — 97110 THERAPEUTIC EXERCISES: CPT | Performed by: PHYSICAL THERAPIST

## 2019-02-08 NOTE — PROGRESS NOTES
Lv Owens  : 1950  [place]  Secondary: [address] at [facility]  [place] , [address], [place]  [phone]   [phone]         OUTPATIENT PHYSICAL THERAPY:Daily Note and Discharge 2019    ICD-10: Treatment Diagnosis: (M25.562) L knee pain; (M25.662) L knee stiffness; (M62.81) muscle weakness; (R 26.2) Difficulty Walking  Precautions/Allergies:   Lisinopril; Losartan; Statins-hmg-coa reductase inhibitors; and Tape [adhesive]     MD Orders: Evaluate and Treat for L TKA (exercises and HEP) MEDICAL/REFERRING DIAGNOSIS:  Presence of left artificial knee joint [Z96.652]   DATE OF ONSET: 18  REFERRING PHYSICIAN: Ruby Guaman., *  RETURN PHYSICIAN APPOINTMENT: 2019     Lv Owens has been seen in physical therapy from 19 to 19 for 8 visits. Treatment has been discontinued at this time due to patient meeting all short and long term goals listed below. He scored an 11% disability rating on the LEFS. He is compliant and independent with his HEP. He has returned to all normal daily activities with ease. Pt is DC'd from PT to HEP at this time. Thank you for this referral.        PROBLEM LIST (Impacting functional limitations):  1. Decreased Strength  2. Decreased ADL/Functional Activities  3. Decreased Ambulation Ability/Technique  4. Decreased Balance  5. Increased Pain  6. Decreased Flexibility/Joint Mobility  7. Edema/Girth  8. Decreased [place] with Home Exercise Program INTERVENTIONS PLANNED:  1. Balance Exercise  2. Bed Mobility  3. Cold  4. Family Education  5. Gait Training  6. Heat  7. Home Exercise Program (HEP)  8. Manual Therapy  9. Neuromuscular Re-education/Strengthening  10. Range of Motion (ROM)  11. Therapeutic Activites  12. Therapeutic Exercise/Strengthening  13. Ultrasound (US)  14.  Kinesiotaping   TREATMENT PLAN:  Effective Dates: 1/14/2019 TO 4/14/2019 (90 days). Frequency/Duration: 2 times a week for 90 Days  GOALS: (Goals have been discussed and agreed upon with patient.)  Short-Term Functional Goals: Time Frame: 4-6 weeks (2-25-19)  1. Pt will be compliant and independent with HEP.--------------------------------------------------------------------------------MET  2. Pt will improve score on the LEFS to >55/80 to increase pt's overall functional mobility.---------------------------------------------MET  3. Pt will improve L knee AROM to 0-120deg to increase pt's ease with transfers and gait.---------------------------------------------------------------MET  4. Pt will be able to sit-stand from standard height without use of UE or compensatory weight shifting to rise.---------------------------------------MET  5. Pt will be able to ambulate with a normal gait pattern for community distances with LRD. ------------------------------------------------------------MET  6. Pt will subjectively report decreased frequency (1-2x/night) of waking due to L knee pain.--------------------------------------------------------------------MET  7. Pt will be able to perform SLS x 5 sec on L without LOB to aide with improving strength and balance for gait.--------------------------------------------MET    Discharge Goals: Time Frame: 8-12 weeks (4-14-19)  1. Pt will improve score on the LEFS to >65/80 to increase pt's overall functional mobility.--------------------------------------------MET  2. Pt will improve L SLS to 8sec to increase pt's balance and control with gait activities. -----------------------------------------------MET  3. Pt will improve L knee AROM to 0-125deg to increase pt's ease with transfers, gait and balance. -----------------------------------------MET  4. Pt will be able to sit-stand from toilet height w/out use of UE for assistance to rise.---------------------------------------------------------------------MET  5.    Pt will be able to ascend 6-8\" steps reciprocally and descend 6\" steps reciprocally with use of a rail with good form and control. ----------------------MET  6. Pt will be able to return to gym activities in the [place] [address] without c/o pain.------------------------------------------------------------------------------------MET  7. Pt will be DC'd from PT to [facility].---------------------------------------------------------------------------------------------------------------------------------------------------------MET  Rehabilitation Potential For Stated Goals: Good  Regarding Mary Pratt's therapy, I certify that the treatment plan above will be carried out by a therapist or under their direction. Thank you for this referral,  Julienne Bamberger, PT                 The information in this section was collected on 1/14/19 (except where otherwise noted). HISTORY:   History of Present Injury/Illness (Reason for Referral):  Pt reports he had a L knee scope in 2001 and a L TKA on 12/5/18. He's done fairly well with rehab at this point, and was [address] from [facility] PT on 1/11/19. He states his average pain is a 3-4/10, and he takes 2 Tylenol and ices 2x/day to manage his pain. He is very active around his home, and would like to be able to return to doing yard work, working in his shop, and working out at the [employer]. Past Medical History/Comorbidities:   Mr. Nichol Cornelisu  has a past medical history of Arthritis, CAD (coronary artery disease), Chest pain, unspecified (8/31/2012), Environmental and seasonal allergies, H/O echocardiogram (02/01/2018), Hyperlipidemia, Hypertension (8/31/2012), Mitral valve regurgitation, NSTEMI (non-ST elevated myocardial infarction) ([facility] [address] (09/02/2012), Post PTCA, Status post left knee replacement (12/5/2018), Varicose veins of both lower extremities (7/6/2016), and Varicose veins of legs. He also has no past medical history of Chronic kidney disease or Stroke ([facility]   Mr. Nichol Cornelius  has a past surgical history that includes pr cardiac surg procedure unlist (2012); hx knee arthroscopy (Left, 2001); hx other surgical (2003); hx lumbar diskectomy (1988); hx other surgical; and hx colonoscopy. Social History/Living Environment:     Pt reports he lives with his wife in a 2 story home with 3 steps to enter. He has a tub-shower combo, and owns a RW and std cane. Pt without an assistive device today. Prior Level of Function/Work/Activity:  Pt is retired, but very active around his home and in the community. Dominant Side:         RIGHT       Ambulatory/Rehab Services H2 Model Falls Risk Assessment (1/14/19)    Risk Factors:       (1)  Gender [Male] Ability to Rise from Chair:       (1)  Pushes up, successful in one attempt    Falls Prevention Plan:       No modifications necessary   Total: (5 or greater = High Risk): 2    ©2010 [facility] . [facility] States Patent #[id number]. Federal Law prohibits the replication, distribution or use without written permission from [facility] [employer]       Current Medications:       Current Outpatient Medications:     chlorpheniramine (CHLOR-TRIMETON) 4 mg tablet, Take 4 mg by mouth every six (6) hours as needed for Allergies. , Disp: , Rfl:     HYDROmorphone (DILAUDID) 2 mg tablet, Take 1 Tab by mouth every four (4) hours as needed. Max Daily Amount: 12 mg., Disp: 40 Tab, Rfl: 0    celecoxib (CELEBREX) 200 mg capsule, Take 200 mg by mouth daily. , Disp: , Rfl:     acetaminophen (TYLENOL) 325 mg tablet, Take 325 mg by mouth every four (4) hours as needed for Pain., Disp: , Rfl:     b complex vitamins tablet, Take 1 Tab by mouth daily. , Disp: , Rfl:     Cetirizine (ZYRTEC) 10 mg cap, Take  by mouth., Disp: , Rfl:     amLODIPine (NORVASC) 5 mg tablet, Take 1 Tab by mouth as needed. (Patient taking differently: Take 5 mg by mouth as needed.  For BP > 150/90), Disp: 90 Tab, Rfl: 3    nitroglycerin (NITROSTAT) 0.4 mg SL tablet, 1 Tab by SubLINGual route every five (5) minutes as needed for Chest Pain. (Patient taking differently: 0.4 mg by SubLINGual route every five (5) minutes as needed for Chest Pain. Never have had to use), Disp: 25 Tab, Rfl: 5    magnesium 250 mg tab, Take  by mouth., Disp: , Rfl:    Date Last Reviewed:  1/14/19   EXAMINATION:   Observation/Orthostatic Postural Assessment:          Pt sit/stands with normal posture and in no apparent distress. Min edema noted in L knee. Palpation:          Pt with moderate tenderness with palpation along medial and lateral L joint line of L knee. Moderate swelling still present. Incision is fully healed and pt is performing scar massage daily. Gait: Pt ambulates without an assistive device with delayed L HS/TO sequencing. Atrophy: pt with atrophy noted in L quad and GS complex compared to the R.   Swelling/Girth (joint line): L: 47.0cm  Incision: Fully healed; no signs of infection  Rolan's Sign: Neg    LE AROM/Strength DATE  1/14/19 DATE  2/8/19   Hip Flexion R:   L:  R:   L:    Hip Abduction  R:   L:  R:   L:    Hip Extension  R:   L:  R:   L:    Knee Flexion  R: WFL; 4/5  L: 110deg; 3/5 R:   L: 125deg; 4/5   Knee Extension  R: WFL; 4/5  L: resting 6deg; Active 3deg; 3/5 R:   L: 0deg; 4/5   Ankle Dorsiflexion  R:   L: R:   L:   Ankle Plantarflexion  R:  L: R:  L:   Flexibility: HS/Quads/GS R:  L:      Special Tests/Function:  Stairs: Pt ascends/descends stairs with a step-to gait pattern. Balance:   Pt with good static balance as observed through activities in the gym today. Dynamic: further testing required. Standing Heel Raises: Able w/ UE support  Sit-stand: Uses B UE, as well as, compensatory weight shifting to the R to rise. Patellar position (static): normal  Patellar tracking:normal  Patellar mobility: hypomobile; pt instructed in patellar mobilizations to begin at home. Body Structures Involved:  1. Nerves  2. Bones  3. Joints  4. Muscles  5.  Ligaments Body Functions Affected:  1. Sensory/Pain  2. Neuromusculoskeletal  3. Movement Related Activities and Participation Affected:  1. Mobility  2. Self Care  3. Domestic Life  4. Interpersonal Interactions and Relationships  5. Community, Social and [place]  6. Gym   CLINICAL PRESENTATION:   CLINICAL DECISION MAKING:   Outcome Measure: Tool Used: Lower Extremity Functional Scale (LEFS)  Score:  Initial: 47/80=41% disability Most Recent: 71/80=11% disability (Date: 2/8/19 )   Interpretation of Score: 20 questions each scored on a 5 point scale with 0 representing \"extreme difficulty or unable to perform\" and 4 representing \"no difficulty\". The lower the score, the greater the functional disability. 80/80 represents no disability. Minimal detectable change is 9 points. Medical Necessity:   · Patient is expected to demonstrate progress in strength, range of motion and balance to improve gait quality and safety over level and unlevel surfaces so that he can return to his normal daily activities including yardwork and gym. .            TREATMENT:   (In addition to Assessment/Re-Assessment sessions the following treatments were rendered)  Pre-treatment Symptoms/Complaints: Pt reports little to no L knee pain today. He states he's really doing well. Pain: Initial:     0-1/10 Post Session:  0-1/10      THERAPEUTIC EXERCISE: (40 minutes):  Exercises per grid below to improve mobility and strength. Required moderate visual, verbal and tactile cues to promote proper body alignment, promote proper body posture, promote proper body mechanics and promote proper body breathing techniques. Progressed resistance, range, repetitions and complexity of movement as indicated. MANUAL THERAPY: (0minutes): STM/MFR  To L knee and surrounding musculature to aide with decreasing pain and tightness and improving overall mobility.       Date:  1/23/19 Date:  1/25/19 Date:  1/30/19 Date:  2/1/19 Date:  2/6/19 Date:  2/8/19 Activity/Exercise Parameters        QS         SLR         SAQ         Bridging w/ add sq         Heel slides w/ ball         Gait in clinic W/ TAs/GS working on HS/TO seq B and appropriate step-length, 200' 200' working on HS/TO seq 300' in HSG; no ' in HSG; no ' in HSG; no AD    Resisted diagonals  RTB, thighs, 50' x 4L        Resisted side-stepping  RTB, thighs, 50' x 4L RTB, thighs, 50' x 4L       High Knee Marching working on balance and control In gym, 50' x 6L In gym, 48' x 6L  In gym, 48' x 6L In gym, 48' x 6L In gym, 50' x 6L   HS/GS str 3 x 30 sec on board 3 x 30sec on board 5 x 30sec 5 x 30sec 5 x 30sec 5 x 30sec   S/L Hip ABD         Clams         Stdg SLS   10 x 5 sec B    10 x 10sec B   8\" step-ups/overs  20x 8\" stairs, 2 flights   1 flight, 2x   6\" step-ups         6\" step-overs forward and side for obstacle negotiation     20x B each 20x B   Heel-to-toe walking forw/back     50' x 4L    Shuttle Press     125#, 30x    Leg Press   85#, 30x 85#, 30x     Knee Ext   L, 10#, 30x 25#, 30x     HSC   35#, 30x 45#, 30x     Practice kneeling on chair     10x 10x   Sit-stand from elevated surface working on equal WB B and w/out use of UE 30x On and off blue pad, 30x 30x On and off blue pad, 30x On and off blue pad, 40x  On/off blue pad, 40x   Recumbent Stepper Res 3, 10min Res 3, 10min Res 3, 10min Res 3, 10min Res 4, 10min Res 4, 10min   Education HEP/ICE HEP/ICE HEP/ICE HEP/ICE HEP/ICE HEP/ICE     L knee AROM: 0-125deg (2-8-19)  Treatment/Session Assessment: See above    · Response to Treatment:  Pt with decreased L knee stiffness after above exercises. · Compliance with Program/Exercises: Pt reports compliance with HEP 1-2x daily. Pt encouraged to ice at home. · Recommendations: Pt is DC'd from PT to HEP at this time.      Variance to POC: NONE    Future Appointments   Date Time Provider Tennille Botelloi   2/25/2019  9:30 AM Antolin Iyer MD [facility]   Total Treatment Duration: 40 minutes  PT Patient Time In/Time Out  Time In: 1430  Time Out: Jaison 812, PT

## 2019-02-26 ENCOUNTER — APPOINTMENT (RX ONLY)
Dept: URBAN - METROPOLITAN AREA CLINIC 24 | Facility: CLINIC | Age: 69
Setting detail: DERMATOLOGY
End: 2019-02-26

## 2019-02-26 DIAGNOSIS — L81.4 OTHER MELANIN HYPERPIGMENTATION: ICD-10-CM

## 2019-02-26 DIAGNOSIS — Z85.828 PERSONAL HISTORY OF OTHER MALIGNANT NEOPLASM OF SKIN: ICD-10-CM

## 2019-02-26 DIAGNOSIS — L57.0 ACTINIC KERATOSIS: ICD-10-CM

## 2019-02-26 PROCEDURE — 17003 DESTRUCT PREMALG LES 2-14: CPT

## 2019-02-26 PROCEDURE — ? LIQUID NITROGEN

## 2019-02-26 PROCEDURE — ? COUNSELING

## 2019-02-26 PROCEDURE — 99213 OFFICE O/P EST LOW 20 MIN: CPT | Mod: 25

## 2019-02-26 PROCEDURE — ? OTHER

## 2019-02-26 PROCEDURE — 17000 DESTRUCT PREMALG LESION: CPT

## 2019-02-26 ASSESSMENT — LOCATION ZONE DERM
LOCATION ZONE: NECK
LOCATION ZONE: TRUNK
LOCATION ZONE: FACE
LOCATION ZONE: EAR
LOCATION ZONE: NOSE
LOCATION ZONE: SCALP
LOCATION ZONE: ARM

## 2019-02-26 ASSESSMENT — LOCATION DETAILED DESCRIPTION DERM
LOCATION DETAILED: RIGHT MEDIAL INFERIOR CHEST
LOCATION DETAILED: RIGHT MID-UPPER BACK
LOCATION DETAILED: RIGHT SUPERIOR FRONTAL SCALP
LOCATION DETAILED: LEFT POSTERIOR SHOULDER
LOCATION DETAILED: NASAL DORSUM
LOCATION DETAILED: LEFT LATERAL FOREHEAD
LOCATION DETAILED: LEFT CENTRAL TEMPLE
LOCATION DETAILED: RIGHT SUPERIOR HELIX
LOCATION DETAILED: LEFT MEDIAL ZYGOMA
LOCATION DETAILED: RIGHT INFERIOR FOREHEAD
LOCATION DETAILED: RIGHT POSTERIOR SHOULDER
LOCATION DETAILED: LEFT SUPERIOR OCCIPITAL SCALP
LOCATION DETAILED: LEFT FOREHEAD
LOCATION DETAILED: POSTERIOR MID-PARIETAL SCALP
LOCATION DETAILED: RIGHT MEDIAL ZYGOMA
LOCATION DETAILED: LEFT SUPERIOR FRONTAL SCALP
LOCATION DETAILED: RIGHT INFERIOR PREAURICULAR CHEEK
LOCATION DETAILED: RIGHT SUPERIOR LATERAL NECK

## 2019-02-26 ASSESSMENT — LOCATION SIMPLE DESCRIPTION DERM
LOCATION SIMPLE: POSTERIOR SCALP
LOCATION SIMPLE: NOSE
LOCATION SIMPLE: RIGHT UPPER BACK
LOCATION SIMPLE: CHEST
LOCATION SIMPLE: LEFT FOREHEAD
LOCATION SIMPLE: RIGHT SHOULDER
LOCATION SIMPLE: RIGHT EAR
LOCATION SIMPLE: NECK
LOCATION SIMPLE: LEFT SHOULDER
LOCATION SIMPLE: LEFT ZYGOMA
LOCATION SIMPLE: RIGHT FOREHEAD
LOCATION SIMPLE: SCALP
LOCATION SIMPLE: RIGHT ZYGOMA
LOCATION SIMPLE: LEFT TEMPLE
LOCATION SIMPLE: RIGHT CHEEK

## 2019-02-26 NOTE — PROCEDURE: OTHER
Detail Level: Simple
Note Text (......Xxx Chief Complaint.): This diagnosis correlates with the
Other (Free Text): Pt advised to use fluorouracil 5% twice a day for 2 weeks on temples.

## 2019-02-27 ENCOUNTER — RX ONLY (OUTPATIENT)
Age: 69
Setting detail: RX ONLY
End: 2019-02-27

## 2019-02-27 RX ORDER — FLUOROURACIL 50 MG/G
CREAM TOPICAL
Qty: 1 | Refills: 0 | Status: ERX | COMMUNITY
Start: 2019-02-27

## 2019-08-29 ENCOUNTER — APPOINTMENT (RX ONLY)
Dept: URBAN - METROPOLITAN AREA CLINIC 24 | Facility: CLINIC | Age: 69
Setting detail: DERMATOLOGY
End: 2019-08-29

## 2019-08-29 DIAGNOSIS — L57.0 ACTINIC KERATOSIS: ICD-10-CM

## 2019-08-29 DIAGNOSIS — L82.1 OTHER SEBORRHEIC KERATOSIS: ICD-10-CM

## 2019-08-29 DIAGNOSIS — D485 NEOPLASM OF UNCERTAIN BEHAVIOR OF SKIN: ICD-10-CM

## 2019-08-29 DIAGNOSIS — Z85.828 PERSONAL HISTORY OF OTHER MALIGNANT NEOPLASM OF SKIN: ICD-10-CM

## 2019-08-29 DIAGNOSIS — D18.0 HEMANGIOMA: ICD-10-CM

## 2019-08-29 DIAGNOSIS — L81.4 OTHER MELANIN HYPERPIGMENTATION: ICD-10-CM

## 2019-08-29 PROBLEM — D18.01 HEMANGIOMA OF SKIN AND SUBCUTANEOUS TISSUE: Status: ACTIVE | Noted: 2019-08-29

## 2019-08-29 PROBLEM — D48.5 NEOPLASM OF UNCERTAIN BEHAVIOR OF SKIN: Status: ACTIVE | Noted: 2019-08-29

## 2019-08-29 PROCEDURE — 99213 OFFICE O/P EST LOW 20 MIN: CPT | Mod: 25

## 2019-08-29 PROCEDURE — 11103 TANGNTL BX SKIN EA SEP/ADDL: CPT

## 2019-08-29 PROCEDURE — ? LIQUID NITROGEN

## 2019-08-29 PROCEDURE — 11102 TANGNTL BX SKIN SINGLE LES: CPT

## 2019-08-29 PROCEDURE — ? BIOPSY BY SHAVE METHOD

## 2019-08-29 PROCEDURE — ? COUNSELING

## 2019-08-29 PROCEDURE — 17003 DESTRUCT PREMALG LES 2-14: CPT

## 2019-08-29 PROCEDURE — 17000 DESTRUCT PREMALG LESION: CPT | Mod: 59

## 2019-08-29 ASSESSMENT — LOCATION SIMPLE DESCRIPTION DERM
LOCATION SIMPLE: NOSE
LOCATION SIMPLE: SCALP
LOCATION SIMPLE: RIGHT UPPER BACK
LOCATION SIMPLE: ABDOMEN
LOCATION SIMPLE: LEFT FOREHEAD
LOCATION SIMPLE: LOWER BACK
LOCATION SIMPLE: RIGHT TEMPLE
LOCATION SIMPLE: LEFT OCCIPITAL SCALP
LOCATION SIMPLE: NECK
LOCATION SIMPLE: CHEST
LOCATION SIMPLE: LEFT TEMPLE
LOCATION SIMPLE: RIGHT CHEEK
LOCATION SIMPLE: LEFT UPPER BACK
LOCATION SIMPLE: RIGHT SHOULDER
LOCATION SIMPLE: LEFT CHEEK
LOCATION SIMPLE: LEFT SHOULDER

## 2019-08-29 ASSESSMENT — LOCATION DETAILED DESCRIPTION DERM
LOCATION DETAILED: RIGHT SUPERIOR LATERAL NECK
LOCATION DETAILED: LEFT LATERAL MALAR CHEEK
LOCATION DETAILED: RIGHT SUPERIOR FRONTAL SCALP
LOCATION DETAILED: LEFT CENTRAL TEMPLE
LOCATION DETAILED: LEFT SUPERIOR FOREHEAD
LOCATION DETAILED: LEFT SUPERIOR OCCIPITAL SCALP
LOCATION DETAILED: RIGHT MEDIAL INFERIOR CHEST
LOCATION DETAILED: SUPERIOR LUMBAR SPINE
LOCATION DETAILED: LEFT SUPERIOR PARIETAL SCALP
LOCATION DETAILED: RIGHT INFERIOR MEDIAL MALAR CHEEK
LOCATION DETAILED: LEFT INFERIOR UPPER BACK
LOCATION DETAILED: RIGHT CENTRAL TEMPLE
LOCATION DETAILED: LEFT MEDIAL TEMPLE
LOCATION DETAILED: RIGHT POSTERIOR SHOULDER
LOCATION DETAILED: LEFT POSTERIOR SHOULDER
LOCATION DETAILED: NASAL DORSUM
LOCATION DETAILED: EPIGASTRIC SKIN
LOCATION DETAILED: RIGHT MID-UPPER BACK
LOCATION DETAILED: RIGHT SUPERIOR MEDIAL UPPER BACK

## 2019-08-29 ASSESSMENT — LOCATION ZONE DERM
LOCATION ZONE: SCALP
LOCATION ZONE: NECK
LOCATION ZONE: TRUNK
LOCATION ZONE: NOSE
LOCATION ZONE: FACE
LOCATION ZONE: ARM

## 2019-08-29 NOTE — PROCEDURE: BIOPSY BY SHAVE METHOD
Wound Care: Vaseline
Notification Instructions: Patient will be notified of biopsy results. However, patient instructed to call the office if not contacted within 2 weeks.
Render Path Notes In Note?: No
Detail Level: Detailed
Type Of Destruction Used: Curettage
Billing Type: Third-Party Bill
Cryotherapy Text: The wound bed was treated with cryotherapy after the biopsy was performed.
Electrodesiccation And Curettage Text: The wound bed was treated with electrodesiccation and curettage after the biopsy was performed.
X Size Of Lesion In Cm: 0
Anesthesia Type: 1% lidocaine with epinephrine
Silver Nitrate Text: The wound bed was treated with silver nitrate after the biopsy was performed.
Biopsy Method: Dermablade
Anesthesia Volume In Cc: 0.3
Dressing: bandage
Was A Bandage Applied: Yes
Path Notes (To The Dermatopathologist): Excise if positive
Biopsy Type: H and E
Electrodesiccation Text: The wound bed was treated with electrodesiccation after the biopsy was performed.
Post-care instructions were reviewed in detail and written instructions are provided. Patient is to keep the biopsy site dry overnight, and then apply bacitracin twice daily until healed. Patient may apply hydrogen peroxide soaks to remove any crusting.
Accession #: [id number]
Depth Of Biopsy: dermis
Hemostasis: Aluminum Chloride
Consent: Written consent was obtained and risks were reviewed including but not limited to scarring, infection, bleeding, scabbing, incomplete removal, and allergy to anesthesia.
Path Notes (To The Dermatopathologist): Mohs if positive

## 2019-11-05 ENCOUNTER — APPOINTMENT (RX ONLY)
Dept: URBAN - METROPOLITAN AREA CLINIC 24 | Facility: CLINIC | Age: 69
Setting detail: DERMATOLOGY
End: 2019-11-05

## 2019-11-05 DIAGNOSIS — D485 NEOPLASM OF UNCERTAIN BEHAVIOR OF SKIN: ICD-10-CM

## 2019-11-05 PROBLEM — D48.5 NEOPLASM OF UNCERTAIN BEHAVIOR OF SKIN: Status: ACTIVE | Noted: 2019-11-05

## 2019-11-05 PROCEDURE — ? BIOPSY BY SHAVE METHOD

## 2019-11-05 PROCEDURE — 11102 TANGNTL BX SKIN SINGLE LES: CPT

## 2019-11-05 ASSESSMENT — LOCATION DETAILED DESCRIPTION DERM: LOCATION DETAILED: LEFT DISTAL RADIAL DORSAL FOREARM

## 2019-11-05 ASSESSMENT — LOCATION ZONE DERM: LOCATION ZONE: ARM

## 2019-11-05 ASSESSMENT — LOCATION SIMPLE DESCRIPTION DERM: LOCATION SIMPLE: LEFT FOREARM

## 2019-11-05 NOTE — PROCEDURE: BIOPSY BY SHAVE METHOD
Biopsy Method: Dermablade
Wound Care: Vaseline
X Size Of Lesion In Cm: 0
Cryotherapy Text: The wound bed was treated with cryotherapy after the biopsy was performed.
Electrodesiccation And Curettage Text: The wound bed was treated with electrodesiccation and curettage after the biopsy was performed.
Type Of Destruction Used: Curettage
Consent: Written consent was obtained and risks were reviewed including but not limited to scarring, infection, bleeding, scabbing, incomplete removal, and allergy to anesthesia.
Destruction After The Procedure: No
Dressing: bandage
Detail Level: Detailed
Biopsy Type: H and E
Was A Bandage Applied: Yes
Post-care instructions were reviewed in detail and written instructions are provided. Patient is to keep the biopsy site dry overnight, and then apply bacitracin twice daily until healed. Patient may apply hydrogen peroxide soaks to remove any crusting.
Notification Instructions: Patient will be notified of biopsy results. However, patient instructed to call the office if not contacted within 2 weeks.
Silver Nitrate Text: The wound bed was treated with silver nitrate after the biopsy was performed.
Accession #: [id number]
Depth Of Biopsy: dermis
Electrodesiccation Text: The wound bed was treated with electrodesiccation after the biopsy was performed.
Hemostasis: Aluminum Chloride
Anesthesia Type: 1% lidocaine with epinephrine
Anesthesia Volume In Cc: 0.3
Billing Type: Third-Party Bill

## 2019-11-19 ENCOUNTER — RX ONLY (OUTPATIENT)
Age: 69
Setting detail: RX ONLY
End: 2019-11-19

## 2019-11-26 ENCOUNTER — APPOINTMENT (RX ONLY)
Dept: URBAN - METROPOLITAN AREA CLINIC 24 | Facility: CLINIC | Age: 69
Setting detail: DERMATOLOGY
End: 2019-11-26

## 2019-11-26 PROBLEM — C44.629 SQUAMOUS CELL CARCINOMA OF SKIN OF LEFT UPPER LIMB, INCLUDING SHOULDER: Status: ACTIVE | Noted: 2019-11-26

## 2019-11-26 PROCEDURE — 12032 INTMD RPR S/A/T/EXT 2.6-7.5: CPT

## 2019-11-26 PROCEDURE — 11603 EXC TR-EXT MAL+MARG 2.1-3 CM: CPT

## 2019-11-26 PROCEDURE — ? EXCISION

## 2019-11-26 NOTE — PROCEDURE: EXCISION
Curvilinear Excision Additional Text (Leave Blank If You Do Not Want): The margin was drawn around the clinically apparent lesion.  A curvilinear shape was then drawn on the skin incorporating the lesion and margins.  Incisions were then made along these lines to the appropriate tissue plane and the lesion was extirpated.
Show Primary And Secondary Defect Sizes Variable (Do Not Hide If You Perform Flaps Or Graft Closures): Yes
Interpolation Flap Text: A decision was made to reconstruct the defect utilizing an interpolation axial flap and a staged reconstruction.  A telfa template was made of the defect.  This telfa template was then used to outline the interpolation flap.  The donor area for the pedicle flap was then injected with anesthesia.  The flap was excised through the skin and subcutaneous tissue down to the layer of the underlying musculature.  The interpolation flap was carefully excised within this deep plane to maintain its blood supply.  The edges of the donor site were undermined.   The donor site was closed in a primary fashion.  The pedicle was then rotated into position and sutured.  Once the tube was sutured into place, adequate blood supply was confirmed with blanching and refill.  The pedicle was then wrapped with xeroform gauze and dressed appropriately with a telfa and gauze bandage to ensure continued blood supply and protect the attached pedicle.
V-Y Flap Text: The defect edges were debeveled with a #15 scalpel blade.  Given the location of the defect, shape of the defect and the proximity to free margins a V-Y flap was deemed most appropriate.  Using a sterile surgical marker, an appropriate advancement flap was drawn incorporating the defect and placing the expected incisions within the relaxed skin tension lines where possible.    The area thus outlined was incised deep to adipose tissue with a #15 scalpel blade.  The skin margins were undermined to an appropriate distance in all directions utilizing iris scissors.
Complex Repair Preamble Text (Leave Blank If You Do Not Want): Extensive wide undermining was performed.
Complex Repair And Rhombic Flap Text: The defect edges were debeveled with a #15 scalpel blade.  The primary defect was closed partially with a complex linear closure.  Given the location of the remaining defect, shape of the defect and the proximity to free margins a rhombic flap was deemed most appropriate for complete closure of the defect.  Using a sterile surgical marker, an appropriate advancement flap was drawn incorporating the defect and placing the expected incisions within the relaxed skin tension lines where possible.    The area thus outlined was incised deep to adipose tissue with a #15 scalpel blade.  The skin margins were undermined to an appropriate distance in all directions utilizing iris scissors.
Lazy S Intermediate Repair Preamble Text (Leave Blank If You Do Not Want): Undermining was performed with blunt dissection.
Estimated Blood Loss (Cc): minimal
Complex Repair And W Plasty Text: The defect edges were debeveled with a #15 scalpel blade.  The primary defect was closed partially with a complex linear closure.  Given the location of the remaining defect, shape of the defect and the proximity to free margins a W plasty was deemed most appropriate for complete closure of the defect.  Using a sterile surgical marker, an appropriate advancement flap was drawn incorporating the defect and placing the expected incisions within the relaxed skin tension lines where possible.    The area thus outlined was incised deep to adipose tissue with a #15 scalpel blade.  The skin margins were undermined to an appropriate distance in all directions utilizing iris scissors.
Information: Selecting Yes will display possible errors in your note based on the variables you have selected. This validation is only offered as a suggestion for you. PLEASE NOTE THAT THE VALIDATION TEXT WILL BE REMOVED WHEN YOU FINALIZE YOUR NOTE. IF YOU WANT TO FAX A PRELIMINARY NOTE YOU WILL NEED TO TOGGLE THIS TO 'NO' IF YOU DO NOT WANT IT IN YOUR FAXED NOTE.
Bill For Surgical Tray: no
Dermal Autograft Text: The defect edges were debeveled with a #15 scalpel blade.  Given the location of the defect, shape of the defect and the proximity to free margins a dermal autograft was deemed most appropriate.  Using a sterile surgical marker, the primary defect shape was transferred to the donor site. The area thus outlined was incised deep to adipose tissue with a #15 scalpel blade.  The harvested graft was then trimmed of adipose and epidermal tissue until only dermis was left.  The skin graft was then placed in the primary defect and oriented appropriately.
Ear Star Wedge Flap Text: The defect edges were debeveled with a #15 blade scalpel.  Given the location of the defect and the proximity to free margins (helical rim) an ear star wedge flap was deemed most appropriate.  Using a sterile surgical marker, the appropriate flap was drawn incorporating the defect and placing the expected incisions between the helical rim and antihelix where possible.  The area thus outlined was incised through and through with a #15 scalpel blade.
Complex Repair And A-T Advancement Flap Text: The defect edges were debeveled with a #15 scalpel blade.  The primary defect was closed partially with a complex linear closure.  Given the location of the remaining defect, shape of the defect and the proximity to free margins an A-T advancement flap was deemed most appropriate for complete closure of the defect.  Using a sterile surgical marker, an appropriate advancement flap was drawn incorporating the defect and placing the expected incisions within the relaxed skin tension lines where possible.    The area thus outlined was incised deep to adipose tissue with a #15 scalpel blade.  The skin margins were undermined to an appropriate distance in all directions utilizing iris scissors.
Complex Repair And Epidermal Autograft Text: The defect edges were debeveled with a #15 scalpel blade.  The primary defect was closed partially with a complex linear closure.  Given the location of the defect, shape of the defect and the proximity to free margins an epidermal autograft was deemed most appropriate to repair the remaining defect.  The graft was trimmed to fit the size of the remaining defect.  The graft was then placed in the primary defect, oriented appropriately, and sutured into place.
Complex Repair And Ftsg Text: The defect edges were debeveled with a #15 scalpel blade.  The primary defect was closed partially with a complex linear closure.  Given the location of the defect, shape of the defect and the proximity to free margins a full thickness skin graft was deemed most appropriate to repair the remaining defect.  The graft was trimmed to fit the size of the remaining defect.  The graft was then placed in the primary defect, oriented appropriately, and sutured into place.
Saucerization Excision Additional Text (Leave Blank If You Do Not Want): The margin was drawn around the clinically apparent lesion.  Incisions were then made along these lines, in a tangential fashion, to the appropriate tissue plane and the lesion was extirpated.
Complex Repair And O-T Advancement Flap Text: The defect edges were debeveled with a #15 scalpel blade.  The primary defect was closed partially with a complex linear closure.  Given the location of the remaining defect, shape of the defect and the proximity to free margins an O-T advancement flap was deemed most appropriate for complete closure of the defect.  Using a sterile surgical marker, an appropriate advancement flap was drawn incorporating the defect and placing the expected incisions within the relaxed skin tension lines where possible.    The area thus outlined was incised deep to adipose tissue with a #15 scalpel blade.  The skin margins were undermined to an appropriate distance in all directions utilizing iris scissors.
Alar Island Pedicle Flap Text: The defect edges were debeveled with a #15 scalpel blade.  Given the location of the defect, shape of the defect and the proximity to the alar rim an island pedicle advancement flap was deemed most appropriate.  Using a sterile surgical marker, an appropriate advancement flap was drawn incorporating the defect, outlining the appropriate donor tissue and placing the expected incisions within the nasal ala running parallel to the alar rim. The area thus outlined was incised with a #15 scalpel blade.  The skin margins were undermined minimally to an appropriate distance in all directions around the primary defect and laterally outward around the island pedicle utilizing iris scissors.  There was minimal undermining beneath the pedicle flap.
Fusiform Excision Additional Text (Leave Blank If You Do Not Want): The margin was drawn around the clinically apparent lesion.  A fusiform shape was then drawn on the skin incorporating the lesion and margins.  Incisions were then made along these lines to the appropriate tissue plane and the lesion was extirpated.
O-L Flap Text: The defect edges were debeveled with a #15 scalpel blade.  Given the location of the defect, shape of the defect and the proximity to free margins an O-L flap was deemed most appropriate.  Using a sterile surgical marker, an appropriate advancement flap was drawn incorporating the defect and placing the expected incisions within the relaxed skin tension lines where possible.    The area thus outlined was incised deep to adipose tissue with a #15 scalpel blade.  The skin margins were undermined to an appropriate distance in all directions utilizing iris scissors.
Dressing: pressure dressing
Banner Transposition Flap Text: The defect edges were debeveled with a #15 scalpel blade.  Given the location of the defect and the proximity to free margins a Banner transposition flap was deemed most appropriate.  Using a sterile surgical marker, an appropriate flap drawn around the defect. The area thus outlined was incised deep to adipose tissue with a #15 scalpel blade.  The skin margins were undermined to an appropriate distance in all directions utilizing iris scissors.
Helical Rim Advancement Flap Text: The defect edges were debeveled with a #15 blade scalpel.  Given the location of the defect and the proximity to free margins (helical rim) a double helical rim advancement flap was deemed most appropriate.  Using a sterile surgical marker, the appropriate advancement flaps were drawn incorporating the defect and placing the expected incisions between the helical rim and antihelix where possible.  The area thus outlined was incised through and through with a #15 scalpel blade.  With a skin hook and iris scissors, the flaps were gently and sharply undermined and freed up.
Complex Repair And Xenograft Text: The defect edges were debeveled with a #15 scalpel blade.  The primary defect was closed partially with a complex linear closure.  Given the location of the defect, shape of the defect and the proximity to free margins an tissue cultured epidermal autograft was deemed most appropriate to repair the remaining defect.  The graft was trimmed to fit the size of the remaining defect.  The graft was then placed in the primary defect, oriented appropriately, and sutured into place.
Positioning (Leave Blank If You Do Not Want): The patient was placed in a comfortable position exposing the surgical site.
Second Skin Substitute Units (Will Override Primary Defect Units If Greater Than 0): 0
Complex Repair And Bilobe Flap Text: The defect edges were debeveled with a #15 scalpel blade.  The primary defect was closed partially with a complex linear closure.  Given the location of the remaining defect, shape of the defect and the proximity to free margins a bilobe flap was deemed most appropriate for complete closure of the defect.  Using a sterile surgical marker, an appropriate advancement flap was drawn incorporating the defect and placing the expected incisions within the relaxed skin tension lines where possible.    The area thus outlined was incised deep to adipose tissue with a #15 scalpel blade.  The skin margins were undermined to an appropriate distance in all directions utilizing iris scissors.
Perilesional Excision Additional Text (Leave Blank If You Do Not Want): The margin was drawn around the clinically apparent lesion. Incisions were then made along these lines to the appropriate tissue plane and the lesion was extirpated.
Posterior Auricular Interpolation Flap Text: A decision was made to reconstruct the defect utilizing an interpolation axial flap and a staged reconstruction.  A telfa template was made of the defect.  This telfa template was then used to outline the posterior auricular interpolation flap.  The donor area for the pedicle flap was then injected with anesthesia.  The flap was excised through the skin and subcutaneous tissue down to the layer of the underlying musculature.  The pedicle flap was carefully excised within this deep plane to maintain its blood supply.  The edges of the donor site were undermined.   The donor site was closed in a primary fashion.  The pedicle was then rotated into position and sutured.  Once the tube was sutured into place, adequate blood supply was confirmed with blanching and refill.  The pedicle was then wrapped with xeroform gauze and dressed appropriately with a telfa and gauze bandage to ensure continued blood supply and protect the attached pedicle.
Island Pedicle Flap Text: The defect edges were debeveled with a #15 scalpel blade.  Given the location of the defect, shape of the defect and the proximity to free margins an island pedicle advancement flap was deemed most appropriate.  Using a sterile surgical marker, an appropriate advancement flap was drawn incorporating the defect, outlining the appropriate donor tissue and placing the expected incisions within the relaxed skin tension lines where possible.    The area thus outlined was incised deep to adipose tissue with a #15 scalpel blade.  The skin margins were undermined to an appropriate distance in all directions around the primary defect and laterally outward around the island pedicle utilizing iris scissors.  There was minimal undermining beneath the pedicle flap.
Complex Repair And Modified Advancement Flap Text: The defect edges were debeveled with a #15 scalpel blade.  The primary defect was closed partially with a complex linear closure.  Given the location of the remaining defect, shape of the defect and the proximity to free margins a modified advancement flap was deemed most appropriate for complete closure of the defect.  Using a sterile surgical marker, an appropriate advancement flap was drawn incorporating the defect and placing the expected incisions within the relaxed skin tension lines where possible.    The area thus outlined was incised deep to adipose tissue with a #15 scalpel blade.  The skin margins were undermined to an appropriate distance in all directions utilizing iris scissors.
Double O-Z Flap Text: The defect edges were debeveled with a #15 scalpel blade.  Given the location of the defect, shape of the defect and the proximity to free margins a Double O-Z flap was deemed most appropriate.  Using a sterile surgical marker, an appropriate transposition flap was drawn incorporating the defect and placing the expected incisions within the relaxed skin tension lines where possible. The area thus outlined was incised deep to adipose tissue with a #15 scalpel blade.  The skin margins were undermined to an appropriate distance in all directions utilizing iris scissors.
Purse String (Intermediate) Text: Given the location of the defect and the characteristics of the surrounding skin a pursestring intermediate closure was deemed most appropriate.  Undermining was performed circumfirentially around the surgical defect.  A purstring suture was then placed and tightened.
Tissue Cultured Epidermal Autograft Text: The defect edges were debeveled with a #15 scalpel blade.  Given the location of the defect, shape of the defect and the proximity to free margins a tissue cultured epidermal autograft was deemed most appropriate.  The graft was then trimmed to fit the size of the defect.  The graft was then placed in the primary defect and oriented appropriately.
Cartilage Graft Text: The defect edges were debeveled with a #15 scalpel blade.  Given the location of the defect, shape of the defect, the fact the defect involved a full thickness cartilage defect a cartilage graft was deemed most appropriate.  An appropriate donor site was identified, cleansed, and anesthetized. The cartilage graft was then harvested and transferred to the recipient site, oriented appropriately and then sutured into place.  The secondary defect was then repaired using a primary closure.
Where Do You Want The Question To Include Opioid Counseling Located?: Case Summary Tab
Island Pedicle Flap With Canthal Suspension Text: The defect edges were debeveled with a #15 scalpel blade.  Given the location of the defect, shape of the defect and the proximity to free margins an island pedicle advancement flap was deemed most appropriate.  Using a sterile surgical marker, an appropriate advancement flap was drawn incorporating the defect, outlining the appropriate donor tissue and placing the expected incisions within the relaxed skin tension lines where possible. The area thus outlined was incised deep to adipose tissue with a #15 scalpel blade.  The skin margins were undermined to an appropriate distance in all directions around the primary defect and laterally outward around the island pedicle utilizing iris scissors.  There was minimal undermining beneath the pedicle flap. A suspension suture was placed in the canthal tendon to prevent tension and prevent ectropion.
Suture Removal: 14 days
Spiral Flap Text: The defect edges were debeveled with a #15 scalpel blade.  Given the location of the defect, shape of the defect and the proximity to free margins a spiral flap was deemed most appropriate.  Using a sterile surgical marker, an appropriate rotation flap was drawn incorporating the defect and placing the expected incisions within the relaxed skin tension lines where possible. The area thus outlined was incised deep to adipose tissue with a #15 scalpel blade.  The skin margins were undermined to an appropriate distance in all directions utilizing iris scissors.
Rhombic Flap Text: The defect edges were debeveled with a #15 scalpel blade.  Given the location of the defect and the proximity to free margins a rhombic flap was deemed most appropriate.  Using a sterile surgical marker, an appropriate rhombic flap was drawn incorporating the defect.    The area thus outlined was incised deep to adipose tissue with a #15 scalpel blade.  The skin margins were undermined to an appropriate distance in all directions utilizing iris scissors.
Home Suture Removal Text: Patient was provided a home suture removal kit and will remove their sutures at home.  If they have any questions or difficulties they will call the office.
Epidermal Sutures: 4-0 Prolene
Anesthesia Volume In Cc: 5
Modified Advancement Flap Text: The defect edges were debeveled with a #15 scalpel blade.  Given the location of the defect, shape of the defect and the proximity to free margins a modified advancement flap was deemed most appropriate.  Using a sterile surgical marker, an appropriate advancement flap was drawn incorporating the defect and placing the expected incisions within the relaxed skin tension lines where possible.    The area thus outlined was incised deep to adipose tissue with a #15 scalpel blade.  The skin margins were undermined to an appropriate distance in all directions utilizing iris scissors.
Melolabial Interpolation Flap Text: A decision was made to reconstruct the defect utilizing an interpolation axial flap and a staged reconstruction.  A telfa template was made of the defect.  This telfa template was then used to outline the melolabial interpolation flap.  The donor area for the pedicle flap was then injected with anesthesia.  The flap was excised through the skin and subcutaneous tissue down to the layer of the underlying musculature.  The pedicle flap was carefully excised within this deep plane to maintain its blood supply.  The edges of the donor site were undermined.   The donor site was closed in a primary fashion.  The pedicle was then rotated into position and sutured.  Once the tube was sutured into place, adequate blood supply was confirmed with blanching and refill.  The pedicle was then wrapped with xeroform gauze and dressed appropriately with a telfa and gauze bandage to ensure continued blood supply and protect the attached pedicle.
Complex Repair And Dorsal Nasal Flap Text: The defect edges were debeveled with a #15 scalpel blade.  The primary defect was closed partially with a complex linear closure.  Given the location of the remaining defect, shape of the defect and the proximity to free margins a dorsal nasal flap was deemed most appropriate for complete closure of the defect.  Using a sterile surgical marker, an appropriate flap was drawn incorporating the defect and placing the expected incisions within the relaxed skin tension lines where possible.    The area thus outlined was incised deep to adipose tissue with a #15 scalpel blade.  The skin margins were undermined to an appropriate distance in all directions utilizing iris scissors.
Keystone Flap Text: The defect edges were debeveled with a #15 scalpel blade.  Given the location of the defect, shape of the defect a keystone flap was deemed most appropriate.  Using a sterile surgical marker, an appropriate keystone flap was drawn incorporating the defect, outlining the appropriate donor tissue and placing the expected incisions within the relaxed skin tension lines where possible. The area thus outlined was incised deep to adipose tissue with a #15 scalpel blade.  The skin margins were undermined to an appropriate distance in all directions around the primary defect and laterally outward around the flap utilizing iris scissors.
Hemostasis: Electrocautery
Mucosal Advancement Flap Text: Given the location of the defect, shape of the defect and the proximity to free margins a mucosal advancement flap was deemed most appropriate. Incisions were made with a 15 blade scalpel in the appropriate fashion along the cutaneous vermillion border and the mucosal lip. The remaining actinically damaged mucosal tissue was excised.  The mucosal advancement flap was then elevated to the gingival sulcus with care taken to preserve the neurovascular structures and advanced into the primary defect. Care was taken to ensure that precise realignment of the vermillion border was achieved.
Path Notes (To The Dermatopathologist): Please check margins
Advancement Flap (Single) Text: The defect edges were debeveled with a #15 scalpel blade.  Given the location of the defect and the proximity to free margins a single advancement flap was deemed most appropriate.  Using a sterile surgical marker, an appropriate advancement flap was drawn incorporating the defect and placing the expected incisions within the relaxed skin tension lines where possible.    The area thus outlined was incised deep to adipose tissue with a #15 scalpel blade.  The skin margins were undermined to an appropriate distance in all directions utilizing iris scissors.
Billing Type: Third-Party Bill
Skin Substitute Text: The defect edges were debeveled with a #15 scalpel blade.  Given the location of the defect, shape of the defect and the proximity to free margins a skin substitute graft was deemed most appropriate.  The graft material was trimmed to fit the size of the defect. The graft was then placed in the primary defect and oriented appropriately.
Mercedes Flap Text: The defect edges were debeveled with a #15 scalpel blade.  Given the location of the defect, shape of the defect and the proximity to free margins a Mercedes flap was deemed most appropriate.  Using a sterile surgical marker, an appropriate advancement flap was drawn incorporating the defect and placing the expected incisions within the relaxed skin tension lines where possible. The area thus outlined was incised deep to adipose tissue with a #15 scalpel blade.  The skin margins were undermined to an appropriate distance in all directions utilizing iris scissors.
Complex Repair And Dermal Autograft Text: The defect edges were debeveled with a #15 scalpel blade.  The primary defect was closed partially with a complex linear closure.  Given the location of the defect, shape of the defect and the proximity to free margins an dermal autograft was deemed most appropriate to repair the remaining defect.  The graft was trimmed to fit the size of the remaining defect.  The graft was then placed in the primary defect, oriented appropriately, and sutured into place.
Complex Repair And Double M Plasty Text: The defect edges were debeveled with a #15 scalpel blade.  The primary defect was closed partially with a complex linear closure.  Given the location of the remaining defect, shape of the defect and the proximity to free margins a double M plasty was deemed most appropriate for complete closure of the defect.  Using a sterile surgical marker, an appropriate advancement flap was drawn incorporating the defect and placing the expected incisions within the relaxed skin tension lines where possible.    The area thus outlined was incised deep to adipose tissue with a #15 scalpel blade.  The skin margins were undermined to an appropriate distance in all directions utilizing iris scissors.
Island Pedicle Flap-Requiring Vessel Identification Text: The defect edges were debeveled with a #15 scalpel blade.  Given the location of the defect, shape of the defect and the proximity to free margins an island pedicle advancement flap was deemed most appropriate.  Using a sterile surgical marker, an appropriate advancement flap was drawn, based on the axial vessel mentioned above, incorporating the defect, outlining the appropriate donor tissue and placing the expected incisions within the relaxed skin tension lines where possible.    The area thus outlined was incised deep to adipose tissue with a #15 scalpel blade.  The skin margins were undermined to an appropriate distance in all directions around the primary defect and laterally outward around the island pedicle utilizing iris scissors.  There was minimal undermining beneath the pedicle flap.
Bilobed Transposition Flap Text: The defect edges were debeveled with a #15 scalpel blade.  Given the location of the defect and the proximity to free margins a bilobed transposition flap was deemed most appropriate.  Using a sterile surgical marker, an appropriate bilobe flap drawn around the defect.    The area thus outlined was incised deep to adipose tissue with a #15 scalpel blade.  The skin margins were undermined to an appropriate distance in all directions utilizing iris scissors.
Repair Type: Intermediate
H Plasty Text: Given the location of the defect, shape of the defect and the proximity to free margins a H-plasty was deemed most appropriate for repair.  Using a sterile surgical marker, the appropriate advancement arms of the H-plasty were drawn incorporating the defect and placing the expected incisions within the relaxed skin tension lines where possible. The area thus outlined was incised deep to adipose tissue with a #15 scalpel blade. The skin margins were undermined to an appropriate distance in all directions utilizing iris scissors.  The opposing advancement arms were then advanced into place in opposite direction and anchored with interrupted buried subcutaneous sutures.
Scalpel Size: 15 blade
Repair Performed By Another Provider Text (Leave Blank If You Do Not Want): After the tissue was excised the defect was repaired by another provider.
Z Plasty Text: The lesion was extirpated to the level of the fat with a #15 scalpel blade.  Given the location of the defect, shape of the defect and the proximity to free margins a Z-plasty was deemed most appropriate for repair.  Using a sterile surgical marker, the appropriate transposition arms of the Z-plasty were drawn incorporating the defect and placing the expected incisions within the relaxed skin tension lines where possible.    The area thus outlined was incised deep to adipose tissue with a #15 scalpel blade.  The skin margins were undermined to an appropriate distance in all directions utilizing iris scissors.  The opposing transposition arms were then transposed into place in opposite direction and anchored with interrupted buried subcutaneous sutures.
Intermediate / Complex Repair - Final Wound Length In Cm: 6
Complex Repair And Skin Substitute Graft Text: The defect edges were debeveled with a #15 scalpel blade.  The primary defect was closed partially with a complex linear closure.  Given the location of the remaining defect, shape of the defect and the proximity to free margins a skin substitute graft was deemed most appropriate to repair the remaining defect.  The graft was trimmed to fit the size of the remaining defect.  The graft was then placed in the primary defect, oriented appropriately, and sutured into place.
Double O-Z Plasty Text: The defect edges were debeveled with a #15 scalpel blade.  Given the location of the defect, shape of the defect and the proximity to free margins a Double O-Z plasty (double transposition flap) was deemed most appropriate.  Using a sterile surgical marker, the appropriate transposition flaps were drawn incorporating the defect and placing the expected incisions within the relaxed skin tension lines where possible. The area thus outlined was incised deep to adipose tissue with a #15 scalpel blade.  The skin margins were undermined to an appropriate distance in all directions utilizing iris scissors.  Hemostasis was achieved with electrocautery.  The flaps were then transposed into place, one clockwise and the other counterclockwise, and anchored with interrupted buried subcutaneous sutures.
A-T Advancement Flap Text: The defect edges were debeveled with a #15 scalpel blade.  Given the location of the defect, shape of the defect and the proximity to free margins an A-T advancement flap was deemed most appropriate.  Using a sterile surgical marker, an appropriate advancement flap was drawn incorporating the defect and placing the expected incisions within the relaxed skin tension lines where possible.    The area thus outlined was incised deep to adipose tissue with a #15 scalpel blade.  The skin margins were undermined to an appropriate distance in all directions utilizing iris scissors.
Composite Graft Text: The defect edges were debeveled with a #15 scalpel blade.  Given the location of the defect, shape of the defect, the proximity to free margins and the fact the defect was full thickness a composite graft was deemed most appropriate.  The defect was outline and then transferred to the donor site.  A full thickness graft was then excised from the donor site. The graft was then placed in the primary defect, oriented appropriately and then sutured into place.  The secondary defect was then repaired using a primary closure.
Burow's Advancement Flap Text: The defect edges were debeveled with a #15 scalpel blade.  Given the location of the defect and the proximity to free margins a Burow's advancement flap was deemed most appropriate.  Using a sterile surgical marker, the appropriate advancement flap was drawn incorporating the defect and placing the expected incisions within the relaxed skin tension lines where possible.    The area thus outlined was incised deep to adipose tissue with a #15 scalpel blade.  The skin margins were undermined to an appropriate distance in all directions utilizing iris scissors.
Advancement-Rotation Flap Text: The defect edges were debeveled with a #15 scalpel blade.  Given the location of the defect, shape of the defect and the proximity to free margins an advancement-rotation flap was deemed most appropriate.  Using a sterile surgical marker, an appropriate flap was drawn incorporating the defect and placing the expected incisions within the relaxed skin tension lines where possible. The area thus outlined was incised deep to adipose tissue with a #15 scalpel blade.  The skin margins were undermined to an appropriate distance in all directions utilizing iris scissors.
Double Island Pedicle Flap Text: The defect edges were debeveled with a #15 scalpel blade.  Given the location of the defect, shape of the defect and the proximity to free margins a double island pedicle advancement flap was deemed most appropriate.  Using a sterile surgical marker, an appropriate advancement flap was drawn incorporating the defect, outlining the appropriate donor tissue and placing the expected incisions within the relaxed skin tension lines where possible.    The area thus outlined was incised deep to adipose tissue with a #15 scalpel blade.  The skin margins were undermined to an appropriate distance in all directions around the primary defect and laterally outward around the island pedicle utilizing iris scissors.  There was minimal undermining beneath the pedicle flap.
Split-Thickness Skin Graft Text: The defect edges were debeveled with a #15 scalpel blade.  Given the location of the defect, shape of the defect and the proximity to free margins a split thickness skin graft was deemed most appropriate.  Using a sterile surgical marker, the primary defect shape was transferred to the donor site. The split thickness graft was then harvested.  The skin graft was then placed in the primary defect and oriented appropriately.
Dorsal Nasal Flap Text: The defect edges were debeveled with a #15 scalpel blade.  Given the location of the defect and the proximity to free margins a dorsal nasal flap was deemed most appropriate.  Using a sterile surgical marker, an appropriate dorsal nasal flap was drawn around the defect.    The area thus outlined was incised deep to adipose tissue with a #15 scalpel blade.  The skin margins were undermined to an appropriate distance in all directions utilizing iris scissors.
Complex Repair And V-Y Plasty Text: The defect edges were debeveled with a #15 scalpel blade.  The primary defect was closed partially with a complex linear closure.  Given the location of the remaining defect, shape of the defect and the proximity to free margins a V-Y plasty was deemed most appropriate for complete closure of the defect.  Using a sterile surgical marker, an appropriate advancement flap was drawn incorporating the defect and placing the expected incisions within the relaxed skin tension lines where possible.    The area thus outlined was incised deep to adipose tissue with a #15 scalpel blade.  The skin margins were undermined to an appropriate distance in all directions utilizing iris scissors.
Complex Repair And Double Advancement Flap Text: The defect edges were debeveled with a #15 scalpel blade.  The primary defect was closed partially with a complex linear closure.  Given the location of the remaining defect, shape of the defect and the proximity to free margins a double advancement flap was deemed most appropriate for complete closure of the defect.  Using a sterile surgical marker, an appropriate advancement flap was drawn incorporating the defect and placing the expected incisions within the relaxed skin tension lines where possible.    The area thus outlined was incised deep to adipose tissue with a #15 scalpel blade.  The skin margins were undermined to an appropriate distance in all directions utilizing iris scissors.
Excision Method: Elliptical
Advancement Flap (Double) Text: The defect edges were debeveled with a #15 scalpel blade.  Given the location of the defect and the proximity to free margins a double advancement flap was deemed most appropriate.  Using a sterile surgical marker, the appropriate advancement flaps were drawn incorporating the defect and placing the expected incisions within the relaxed skin tension lines where possible.    The area thus outlined was incised deep to adipose tissue with a #15 scalpel blade.  The skin margins were undermined to an appropriate distance in all directions utilizing iris scissors.
Anesthesia Type: 1% lidocaine with epinephrine
Complex Repair And M Plasty Text: The defect edges were debeveled with a #15 scalpel blade.  The primary defect was closed partially with a complex linear closure.  Given the location of the remaining defect, shape of the defect and the proximity to free margins an M plasty was deemed most appropriate for complete closure of the defect.  Using a sterile surgical marker, an appropriate advancement flap was drawn incorporating the defect and placing the expected incisions within the relaxed skin tension lines where possible.    The area thus outlined was incised deep to adipose tissue with a #15 scalpel blade.  The skin margins were undermined to an appropriate distance in all directions utilizing iris scissors.
Excision Depth: adipose tissue
O-T Plasty Text: The defect edges were debeveled with a #15 scalpel blade.  Given the location of the defect, shape of the defect and the proximity to free margins an O-T plasty was deemed most appropriate.  Using a sterile surgical marker, an appropriate O-T plasty was drawn incorporating the defect and placing the expected incisions within the relaxed skin tension lines where possible.    The area thus outlined was incised deep to adipose tissue with a #15 scalpel blade.  The skin margins were undermined to an appropriate distance in all directions utilizing iris scissors.
O-Z Flap Text: The defect edges were debeveled with a #15 scalpel blade.  Given the location of the defect, shape of the defect and the proximity to free margins an O-Z flap was deemed most appropriate.  Using a sterile surgical marker, an appropriate transposition flap was drawn incorporating the defect and placing the expected incisions within the relaxed skin tension lines where possible. The area thus outlined was incised deep to adipose tissue with a #15 scalpel blade.  The skin margins were undermined to an appropriate distance in all directions utilizing iris scissors.
Deep Sutures: 4-0 Vicryl
Complex Repair And Z Plasty Text: The defect edges were debeveled with a #15 scalpel blade.  The primary defect was closed partially with a complex linear closure.  Given the location of the remaining defect, shape of the defect and the proximity to free margins a Z plasty was deemed most appropriate for complete closure of the defect.  Using a sterile surgical marker, an appropriate advancement flap was drawn incorporating the defect and placing the expected incisions within the relaxed skin tension lines where possible.    The area thus outlined was incised deep to adipose tissue with a #15 scalpel blade.  The skin margins were undermined to an appropriate distance in all directions utilizing iris scissors.
Detail Level: Detailed
Xenograft Text: The defect edges were debeveled with a #15 scalpel blade.  Given the location of the defect, shape of the defect and the proximity to free margins a xenograft was deemed most appropriate.  The graft was then trimmed to fit the size of the defect.  The graft was then placed in the primary defect and oriented appropriately.
Complex Repair And O-L Flap Text: The defect edges were debeveled with a #15 scalpel blade.  The primary defect was closed partially with a complex linear closure.  Given the location of the remaining defect, shape of the defect and the proximity to free margins an O-L flap was deemed most appropriate for complete closure of the defect.  Using a sterile surgical marker, an appropriate flap was drawn incorporating the defect and placing the expected incisions within the relaxed skin tension lines where possible.    The area thus outlined was incised deep to adipose tissue with a #15 scalpel blade.  The skin margins were undermined to an appropriate distance in all directions utilizing iris scissors.
Slit Excision Additional Text (Leave Blank If You Do Not Want): A linear line was drawn on the skin overlying the lesion. An incision was made slowly until the lesion was visualized.  Once visualized, the lesion was removed with blunt dissection.
Complex Repair And Transposition Flap Text: The defect edges were debeveled with a #15 scalpel blade.  The primary defect was closed partially with a complex linear closure.  Given the location of the remaining defect, shape of the defect and the proximity to free margins a transposition flap was deemed most appropriate for complete closure of the defect.  Using a sterile surgical marker, an appropriate advancement flap was drawn incorporating the defect and placing the expected incisions within the relaxed skin tension lines where possible.    The area thus outlined was incised deep to adipose tissue with a #15 scalpel blade.  The skin margins were undermined to an appropriate distance in all directions utilizing iris scissors.
Graft Donor Site Bandage (Optional-Leave Blank If You Don't Want In Note): Steri-strips and a pressure bandage were applied to the donor site.
Bi-Rhombic Flap Text: The defect edges were debeveled with a #15 scalpel blade.  Given the location of the defect and the proximity to free margins a bi-rhombic flap was deemed most appropriate.  Using a sterile surgical marker, an appropriate rhombic flap was drawn incorporating the defect. The area thus outlined was incised deep to adipose tissue with a #15 scalpel blade.  The skin margins were undermined to an appropriate distance in all directions utilizing iris scissors.
Cheek-To-Nose Interpolation Flap Text: A decision was made to reconstruct the defect utilizing an interpolation axial flap and a staged reconstruction.  A telfa template was made of the defect.  This telfa template was then used to outline the Cheek-To-Nose Interpolation flap.  The donor area for the pedicle flap was then injected with anesthesia.  The flap was excised through the skin and subcutaneous tissue down to the layer of the underlying musculature.  The interpolation flap was carefully excised within this deep plane to maintain its blood supply.  The edges of the donor site were undermined.   The donor site was closed in a primary fashion.  The pedicle was then rotated into position and sutured.  Once the tube was sutured into place, adequate blood supply was confirmed with blanching and refill.  The pedicle was then wrapped with xeroform gauze and dressed appropriately with a telfa and gauze bandage to ensure continued blood supply and protect the attached pedicle.
Accession #: [id number]
Complex Repair And Split-Thickness Skin Graft Text: The defect edges were debeveled with a #15 scalpel blade.  The primary defect was closed partially with a complex linear closure.  Given the location of the defect, shape of the defect and the proximity to free margins a split thickness skin graft was deemed most appropriate to repair the remaining defect.  The graft was trimmed to fit the size of the remaining defect.  The graft was then placed in the primary defect, oriented appropriately, and sutured into place.
Complex Repair And Rotation Flap Text: The defect edges were debeveled with a #15 scalpel blade.  The primary defect was closed partially with a complex linear closure.  Given the location of the remaining defect, shape of the defect and the proximity to free margins a rotation flap was deemed most appropriate for complete closure of the defect.  Using a sterile surgical marker, an appropriate advancement flap was drawn incorporating the defect and placing the expected incisions within the relaxed skin tension lines where possible.    The area thus outlined was incised deep to adipose tissue with a #15 scalpel blade.  The skin margins were undermined to an appropriate distance in all directions utilizing iris scissors.
Rhomboid Transposition Flap Text: The defect edges were debeveled with a #15 scalpel blade.  Given the location of the defect and the proximity to free margins a rhomboid transposition flap was deemed most appropriate.  Using a sterile surgical marker, an appropriate rhomboid flap was drawn incorporating the defect.    The area thus outlined was incised deep to adipose tissue with a #15 scalpel blade.  The skin margins were undermined to an appropriate distance in all directions utilizing iris scissors.
No Repair - Repaired With Adjacent Surgical Defect Text (Leave Blank If You Do Not Want): After the excision the defect was repaired concurrently with another surgical defect which was in close approximation.
Crescentic Advancement Flap Text: The defect edges were debeveled with a #15 scalpel blade.  Given the location of the defect and the proximity to free margins a crescentic advancement flap was deemed most appropriate.  Using a sterile surgical marker, the appropriate advancement flap was drawn incorporating the defect and placing the expected incisions within the relaxed skin tension lines where possible.    The area thus outlined was incised deep to adipose tissue with a #15 scalpel blade.  The skin margins were undermined to an appropriate distance in all directions utilizing iris scissors.
Trilobed Flap Text: The defect edges were debeveled with a #15 scalpel blade.  Given the location of the defect and the proximity to free margins a trilobed flap was deemed most appropriate.  Using a sterile surgical marker, an appropriate trilobed flap drawn around the defect.    The area thus outlined was incised deep to adipose tissue with a #15 scalpel blade.  The skin margins were undermined to an appropriate distance in all directions utilizing iris scissors.
S Plasty Text: Given the location and shape of the defect, and the orientation of relaxed skin tension lines, an S-plasty was deemed most appropriate for repair.  Using a sterile surgical marker, the appropriate outline of the S-plasty was drawn, incorporating the defect and placing the expected incisions within the relaxed skin tension lines where possible.  The area thus outlined was incised deep to adipose tissue with a #15 scalpel blade.  The skin margins were undermined to an appropriate distance in all directions utilizing iris scissors. The skin flaps were advanced over the defect.  The opposing margins were then approximated with interrupted buried subcutaneous sutures.
Size Of Lesion In Cm: 1.9
Melolabial Transposition Flap Text: The defect edges were debeveled with a #15 scalpel blade.  Given the location of the defect and the proximity to free margins a melolabial flap was deemed most appropriate.  Using a sterile surgical marker, an appropriate melolabial transposition flap was drawn incorporating the defect.    The area thus outlined was incised deep to adipose tissue with a #15 scalpel blade.  The skin margins were undermined to an appropriate distance in all directions utilizing iris scissors.
Cheek Interpolation Flap Text: A decision was made to reconstruct the defect utilizing an interpolation axial flap and a staged reconstruction.  A telfa template was made of the defect.  This telfa template was then used to outline the Cheek Interpolation flap.  The donor area for the pedicle flap was then injected with anesthesia.  The flap was excised through the skin and subcutaneous tissue down to the layer of the underlying musculature.  The interpolation flap was carefully excised within this deep plane to maintain its blood supply.  The edges of the donor site were undermined.   The donor site was closed in a primary fashion.  The pedicle was then rotated into position and sutured.  Once the tube was sutured into place, adequate blood supply was confirmed with blanching and refill.  The pedicle was then wrapped with xeroform gauze and dressed appropriately with a telfa and gauze bandage to ensure continued blood supply and protect the attached pedicle.
Star Wedge Flap Text: The defect edges were debeveled with a #15 scalpel blade.  Given the location of the defect, shape of the defect and the proximity to free margins a star wedge flap was deemed most appropriate.  Using a sterile surgical marker, an appropriate rotation flap was drawn incorporating the defect and placing the expected incisions within the relaxed skin tension lines where possible. The area thus outlined was incised deep to adipose tissue with a #15 scalpel blade.  The skin margins were undermined to an appropriate distance in all directions utilizing iris scissors.
Size Of Margin In Cm: 0.3
Purse String (Simple) Text: Given the location of the defect and the characteristics of the surrounding skin a purse string simple closure was deemed most appropriate.  Undermining was performed circumferentially around the surgical defect.  A purse string suture was then placed and tightened.
Pre-Excision Curettage Text (Leave Blank If You Do Not Want): Prior to drawing the surgical margin the visible lesion was removed with electrodesiccation and curettage to clearly define the lesion size.
Bilateral Helical Rim Advancement Flap Text: The defect edges were debeveled with a #15 blade scalpel.  Given the location of the defect and the proximity to free margins (helical rim) a bilateral helical rim advancement flap was deemed most appropriate.  Using a sterile surgical marker, the appropriate advancement flaps were drawn incorporating the defect and placing the expected incisions between the helical rim and antihelix where possible.  The area thus outlined was incised through and through with a #15 scalpel blade.  With a skin hook and iris scissors, the flaps were gently and sharply undermined and freed up.
Rotation Flap Text: The defect edges were debeveled with a #15 scalpel blade.  Given the location of the defect, shape of the defect and the proximity to free margins a rotation flap was deemed most appropriate.  Using a sterile surgical marker, an appropriate rotation flap was drawn incorporating the defect and placing the expected incisions within the relaxed skin tension lines where possible.    The area thus outlined was incised deep to adipose tissue with a #15 scalpel blade.  The skin margins were undermined to an appropriate distance in all directions utilizing iris scissors.
O-T Advancement Flap Text: The defect edges were debeveled with a #15 scalpel blade.  Given the location of the defect, shape of the defect and the proximity to free margins an O-T advancement flap was deemed most appropriate.  Using a sterile surgical marker, an appropriate advancement flap was drawn incorporating the defect and placing the expected incisions within the relaxed skin tension lines where possible.    The area thus outlined was incised deep to adipose tissue with a #15 scalpel blade.  The skin margins were undermined to an appropriate distance in all directions utilizing iris scissors.
Partial Purse String (Simple) Text: Given the location of the defect and the characteristics of the surrounding skin a simple purse string closure was deemed most appropriate.  Undermining was performed circumferentially around the surgical defect.  A purse string suture was then placed and tightened. Wound tension of the circular defect prevented complete closure of the wound.
V-Y Plasty Text: The defect edges were debeveled with a #15 scalpel blade.  Given the location of the defect, shape of the defect and the proximity to free margins an V-Y advancement flap was deemed most appropriate.  Using a sterile surgical marker, an appropriate advancement flap was drawn incorporating the defect and placing the expected incisions within the relaxed skin tension lines where possible.    The area thus outlined was incised deep to adipose tissue with a #15 scalpel blade.  The skin margins were undermined to an appropriate distance in all directions utilizing iris scissors.
Hatchet Flap Text: The defect edges were debeveled with a #15 scalpel blade.  Given the location of the defect, shape of the defect and the proximity to free margins a hatchet flap was deemed most appropriate.  Using a sterile surgical marker, an appropriate hatchet flap was drawn incorporating the defect and placing the expected incisions within the relaxed skin tension lines where possible.    The area thus outlined was incised deep to adipose tissue with a #15 scalpel blade.  The skin margins were undermined to an appropriate distance in all directions utilizing iris scissors.
Complex Repair And Single Advancement Flap Text: The defect edges were debeveled with a #15 scalpel blade.  The primary defect was closed partially with a complex linear closure.  Given the location of the remaining defect, shape of the defect and the proximity to free margins a single advancement flap was deemed most appropriate for complete closure of the defect.  Using a sterile surgical marker, an appropriate advancement flap was drawn incorporating the defect and placing the expected incisions within the relaxed skin tension lines where possible.    The area thus outlined was incised deep to adipose tissue with a #15 scalpel blade.  The skin margins were undermined to an appropriate distance in all directions utilizing iris scissors.
Excisional Biopsy Additional Text (Leave Blank If You Do Not Want): The margin was drawn around the clinically apparent lesion.  An elliptical shape was then drawn on the skin incorporating the lesion and margins.  Incisions were then made along these lines to the appropriate tissue plane and the lesion was extirpated.
Paramedian Forehead Flap Text: A decision was made to reconstruct the defect utilizing an interpolation axial flap and a staged reconstruction.  A telfa template was made of the defect.  This telfa template was then used to outline the paramedian forehead pedicle flap.  The donor area for the pedicle flap was then injected with anesthesia.  The flap was excised through the skin and subcutaneous tissue down to the layer of the underlying musculature.  The pedicle flap was carefully excised within this deep plane to maintain its blood supply.  The edges of the donor site were undermined.   The donor site was closed in a primary fashion.  The pedicle was then rotated into position and sutured.  Once the tube was sutured into place, adequate blood supply was confirmed with blanching and refill.  The pedicle was then wrapped with xeroform gauze and dressed appropriately with a telfa and gauze bandage to ensure continued blood supply and protect the attached pedicle.
Post-Care Instructions: I reviewed with the patient in detail post-care instructions. Patient is not to engage in any heavy lifting, exercise, or swimming for the next 7 days. Should the patient develop any fevers, chills, bleeding, severe pain patient will contact the office immediately.
Transposition Flap Text: The defect edges were debeveled with a #15 scalpel blade.  Given the location of the defect and the proximity to free margins a transposition flap was deemed most appropriate.  Using a sterile surgical marker, an appropriate transposition flap was drawn incorporating the defect.    The area thus outlined was incised deep to adipose tissue with a #15 scalpel blade.  The skin margins were undermined to an appropriate distance in all directions utilizing iris scissors.
W Plasty Text: The lesion was extirpated to the level of the fat with a #15 scalpel blade.  Given the location of the defect, shape of the defect and the proximity to free margins a W-plasty was deemed most appropriate for repair.  Using a sterile surgical marker, the appropriate transposition arms of the W-plasty were drawn incorporating the defect and placing the expected incisions within the relaxed skin tension lines where possible.    The area thus outlined was incised deep to adipose tissue with a #15 scalpel blade.  The skin margins were undermined to an appropriate distance in all directions utilizing iris scissors.  The opposing transposition arms were then transposed into place in opposite direction and anchored with interrupted buried subcutaneous sutures.
Ftsg Text: The defect edges were debeveled with a #15 scalpel blade.  Given the location of the defect, shape of the defect and the proximity to free margins a full thickness skin graft was deemed most appropriate.  Using a sterile surgical marker, the primary defect shape was transferred to the donor site. The area thus outlined was incised deep to adipose tissue with a #15 scalpel blade.  The harvested graft was then trimmed of adipose tissue until only dermis and epidermis was left.  The skin margins of the secondary defect were undermined to an appropriate distance in all directions utilizing iris scissors.  The secondary defect was closed with interrupted buried subcutaneous sutures.  The skin edges were then re-apposed with running  sutures.  The skin graft was then placed in the primary defect and oriented appropriately.
Bilobed Flap Text: The defect edges were debeveled with a #15 scalpel blade.  Given the location of the defect and the proximity to free margins a bilobe flap was deemed most appropriate.  Using a sterile surgical marker, an appropriate bilobe flap drawn around the defect.    The area thus outlined was incised deep to adipose tissue with a #15 scalpel blade.  The skin margins were undermined to an appropriate distance in all directions utilizing iris scissors.
Chonodrocutaneous Helical Advancement Flap Text: The defect edges were debeveled with a #15 scalpel blade.  Given the location of the defect and the proximity to free margins a chondrocutaneous helical advancement flap was deemed most appropriate.  Using a sterile surgical marker, the appropriate advancement flap was drawn incorporating the defect and placing the expected incisions within the relaxed skin tension lines where possible.    The area thus outlined was incised deep to adipose tissue with a #15 scalpel blade.  The skin margins were undermined to an appropriate distance in all directions utilizing iris scissors.
Mastoid Interpolation Flap Text: A decision was made to reconstruct the defect utilizing an interpolation axial flap and a staged reconstruction.  A telfa template was made of the defect.  This telfa template was then used to outline the mastoid interpolation flap.  The donor area for the pedicle flap was then injected with anesthesia.  The flap was excised through the skin and subcutaneous tissue down to the layer of the underlying musculature.  The pedicle flap was carefully excised within this deep plane to maintain its blood supply.  The edges of the donor site were undermined.   The donor site was closed in a primary fashion.  The pedicle was then rotated into position and sutured.  Once the tube was sutured into place, adequate blood supply was confirmed with blanching and refill.  The pedicle was then wrapped with xeroform gauze and dressed appropriately with a telfa and gauze bandage to ensure continued blood supply and protect the attached pedicle.
Epidermal Closure: running
Epidermal Autograft Text: The defect edges were debeveled with a #15 scalpel blade.  Given the location of the defect, shape of the defect and the proximity to free margins an epidermal autograft was deemed most appropriate.  Using a sterile surgical marker, the primary defect shape was transferred to the donor site. The epidermal graft was then harvested.  The skin graft was then placed in the primary defect and oriented appropriately.
Complex Repair And Melolabial Flap Text: The defect edges were debeveled with a #15 scalpel blade.  The primary defect was closed partially with a complex linear closure.  Given the location of the remaining defect, shape of the defect and the proximity to free margins a melolabial flap was deemed most appropriate for complete closure of the defect.  Using a sterile surgical marker, an appropriate advancement flap was drawn incorporating the defect and placing the expected incisions within the relaxed skin tension lines where possible.    The area thus outlined was incised deep to adipose tissue with a #15 scalpel blade.  The skin margins were undermined to an appropriate distance in all directions utilizing iris scissors.
O-Z Plasty Text: The defect edges were debeveled with a #15 scalpel blade.  Given the location of the defect, shape of the defect and the proximity to free margins an O-Z plasty (double transposition flap) was deemed most appropriate.  Using a sterile surgical marker, the appropriate transposition flaps were drawn incorporating the defect and placing the expected incisions within the relaxed skin tension lines where possible.    The area thus outlined was incised deep to adipose tissue with a #15 scalpel blade.  The skin margins were undermined to an appropriate distance in all directions utilizing iris scissors.  Hemostasis was achieved with electrocautery.  The flaps were then transposed into place, one clockwise and the other counterclockwise, and anchored with interrupted buried subcutaneous sutures.
Partial Purse String (Intermediate) Text: Given the location of the defect and the characteristics of the surrounding skin an intermediate purse string closure was deemed most appropriate.  Undermining was performed circumferentially around the surgical defect.  A purse string suture was then placed and tightened. Wound tension of the circular defect prevented complete closure of the wound.
Epidermal Closure Graft Donor Site (Optional): simple interrupted
Wound Care: Vaseline
Muscle Hinge Flap Text: The defect edges were debeveled with a #15 scalpel blade.  Given the size, depth and location of the defect and the proximity to free margins a muscle hinge flap was deemed most appropriate.  Using a sterile surgical marker, an appropriate hinge flap was drawn incorporating the defect. The area thus outlined was incised with a #15 scalpel blade.  The skin margins were undermined to an appropriate distance in all directions utilizing iris scissors.
Consent was obtained from the patient. The risks and benefits to therapy were discussed in detail. Specifically, the risks of infection, scarring, bleeding, prolonged wound healing, incomplete removal, allergy to anesthesia, nerve injury and recurrence were addressed. Prior to the procedure, the treatment site was clearly identified and confirmed by the patient. All components of Universal Protocol/PAUSE Rule completed.
Lip Wedge Excision Repair Text: Given the location of the defect and the proximity to free margins a full thickness wedge repair was deemed most appropriate.  Using a sterile surgical marker, the appropriate repair was drawn incorporating the defect and placing the expected incisions perpendicular to the vermillion border.  The vermillion border was also meticulously outlined to ensure appropriate reapproximation during the repair.  The area thus outlined was incised through and through with a #15 scalpel blade.  The muscularis and dermis were reaproximated with deep sutures following hemostasis. Care was taken to realign the vermillion border before proceeding with the superficial closure.  Once the vermillion was realigned the superfical and mucosal closure was finished.

## 2019-12-10 ENCOUNTER — APPOINTMENT (RX ONLY)
Dept: URBAN - METROPOLITAN AREA CLINIC 24 | Facility: CLINIC | Age: 69
Setting detail: DERMATOLOGY
End: 2019-12-10

## 2019-12-10 DIAGNOSIS — Z48.01 ENCOUNTER FOR CHANGE OR REMOVAL OF SURGICAL WOUND DRESSING: ICD-10-CM

## 2019-12-10 PROCEDURE — ? SUTURE REMOVAL (GLOBAL PERIOD)

## 2019-12-10 ASSESSMENT — LOCATION DETAILED DESCRIPTION DERM: LOCATION DETAILED: LEFT DISTAL RADIAL DORSAL FOREARM

## 2019-12-10 ASSESSMENT — LOCATION ZONE DERM: LOCATION ZONE: ARM

## 2019-12-10 ASSESSMENT — LOCATION SIMPLE DESCRIPTION DERM: LOCATION SIMPLE: LEFT FOREARM

## 2019-12-10 NOTE — PROCEDURE: SUTURE REMOVAL (GLOBAL PERIOD)
Add 75696 Cpt? (Important Note: In 2017 The Use Of 61859 Is Being Tracked By Cms To Determine Future Global Period Reimbursement For Global Periods): no
Detail Level: Simple

## 2020-07-07 ENCOUNTER — APPOINTMENT (RX ONLY)
Dept: URBAN - METROPOLITAN AREA CLINIC 24 | Facility: CLINIC | Age: 70
Setting detail: DERMATOLOGY
End: 2020-07-07

## 2020-07-07 DIAGNOSIS — L81.4 OTHER MELANIN HYPERPIGMENTATION: ICD-10-CM

## 2020-07-07 DIAGNOSIS — L57.0 ACTINIC KERATOSIS: ICD-10-CM

## 2020-07-07 DIAGNOSIS — Z85.828 PERSONAL HISTORY OF OTHER MALIGNANT NEOPLASM OF SKIN: ICD-10-CM

## 2020-07-07 DIAGNOSIS — D18.0 HEMANGIOMA: ICD-10-CM

## 2020-07-07 DIAGNOSIS — Z87.2 PERSONAL HISTORY OF DISEASES OF THE SKIN AND SUBCUTANEOUS TISSUE: ICD-10-CM

## 2020-07-07 DIAGNOSIS — L82.1 OTHER SEBORRHEIC KERATOSIS: ICD-10-CM

## 2020-07-07 PROBLEM — D18.01 HEMANGIOMA OF SKIN AND SUBCUTANEOUS TISSUE: Status: ACTIVE | Noted: 2020-07-07

## 2020-07-07 PROBLEM — D48.5 NEOPLASM OF UNCERTAIN BEHAVIOR OF SKIN: Status: ACTIVE | Noted: 2020-07-07

## 2020-07-07 PROBLEM — L55.1 SUNBURN OF SECOND DEGREE: Status: ACTIVE | Noted: 2020-07-07

## 2020-07-07 PROBLEM — L85.3 XEROSIS CUTIS: Status: ACTIVE | Noted: 2020-07-07

## 2020-07-07 PROBLEM — L29.8 OTHER PRURITUS: Status: ACTIVE | Noted: 2020-07-07

## 2020-07-07 PROCEDURE — ? LIQUID NITROGEN

## 2020-07-07 PROCEDURE — 17003 DESTRUCT PREMALG LES 2-14: CPT

## 2020-07-07 PROCEDURE — 17000 DESTRUCT PREMALG LESION: CPT | Mod: 59

## 2020-07-07 PROCEDURE — 99213 OFFICE O/P EST LOW 20 MIN: CPT | Mod: 25

## 2020-07-07 PROCEDURE — ? COUNSELING

## 2020-07-07 PROCEDURE — ? BIOPSY BY SHAVE METHOD

## 2020-07-07 PROCEDURE — 11102 TANGNTL BX SKIN SINGLE LES: CPT

## 2020-07-07 ASSESSMENT — LOCATION DETAILED DESCRIPTION DERM
LOCATION DETAILED: LEFT INFERIOR UPPER BACK
LOCATION DETAILED: LEFT DISTAL RADIAL DORSAL FOREARM
LOCATION DETAILED: RIGHT LATERAL FOREHEAD
LOCATION DETAILED: LEFT SUPERIOR FOREHEAD
LOCATION DETAILED: LEFT CENTRAL MALAR CHEEK
LOCATION DETAILED: RIGHT MEDIAL INFERIOR CHEST
LOCATION DETAILED: RIGHT POSTERIOR SHOULDER
LOCATION DETAILED: RIGHT MID-UPPER BACK
LOCATION DETAILED: LEFT POSTERIOR SHOULDER
LOCATION DETAILED: RIGHT SUPERIOR LATERAL NECK
LOCATION DETAILED: EPIGASTRIC SKIN
LOCATION DETAILED: NASAL DORSUM
LOCATION DETAILED: RIGHT INFERIOR LATERAL MALAR CHEEK
LOCATION DETAILED: RIGHT CENTRAL TEMPLE
LOCATION DETAILED: SUPERIOR LUMBAR SPINE
LOCATION DETAILED: RIGHT SUPERIOR FOREHEAD

## 2020-07-07 ASSESSMENT — LOCATION SIMPLE DESCRIPTION DERM
LOCATION SIMPLE: LEFT UPPER BACK
LOCATION SIMPLE: NOSE
LOCATION SIMPLE: LEFT CHEEK
LOCATION SIMPLE: CHEST
LOCATION SIMPLE: LEFT FOREARM
LOCATION SIMPLE: RIGHT UPPER BACK
LOCATION SIMPLE: RIGHT TEMPLE
LOCATION SIMPLE: RIGHT CHEEK
LOCATION SIMPLE: LEFT FOREHEAD
LOCATION SIMPLE: RIGHT FOREHEAD
LOCATION SIMPLE: RIGHT SHOULDER
LOCATION SIMPLE: NECK
LOCATION SIMPLE: ABDOMEN
LOCATION SIMPLE: LOWER BACK
LOCATION SIMPLE: LEFT SHOULDER

## 2020-07-07 ASSESSMENT — LOCATION ZONE DERM
LOCATION ZONE: NOSE
LOCATION ZONE: TRUNK
LOCATION ZONE: FACE
LOCATION ZONE: NECK
LOCATION ZONE: ARM

## 2020-07-07 NOTE — PROCEDURE: BIOPSY BY SHAVE METHOD
Was A Bandage Applied: Yes
Type Of Destruction Used: Curettage
Wound Care: Vaseline
Cryotherapy Text: The wound bed was treated with cryotherapy after the biopsy was performed.
X Size Of Lesion In Cm: 0
Bill For Surgical Tray: no
Anesthesia Volume In Cc: 0.3
Silver Nitrate Text: The wound bed was treated with silver nitrate after the biopsy was performed.
Accession #: [id number]
Electrodesiccation Text: The wound bed was treated with electrodesiccation after the biopsy was performed.
Consent: Written consent was obtained and risks were reviewed including but not limited to scarring, infection, bleeding, scabbing, incomplete removal, and allergy to anesthesia.
Anesthesia Type: 1% lidocaine with epinephrine
Biopsy Method: Dermablade
Post-care instructions were reviewed in detail and written instructions are provided. Patient is to keep the biopsy site dry overnight, and then apply bacitracin twice daily until healed. Patient may apply hydrogen peroxide soaks to remove any crusting.
Information: Selecting Yes will display possible errors in your note based on the variables you have selected. This validation is only offered as a suggestion for you. PLEASE NOTE THAT THE VALIDATION TEXT WILL BE REMOVED WHEN YOU FINALIZE YOUR NOTE. IF YOU WANT TO FAX A PRELIMINARY NOTE YOU WILL NEED TO TOGGLE THIS TO 'NO' IF YOU DO NOT WANT IT IN YOUR FAXED NOTE.
Electrodesiccation And Curettage Text: The wound bed was treated with electrodesiccation and curettage after the biopsy was performed.
Biopsy Type: H and E
Notification Instructions: Patient will be notified of biopsy results. However, patient instructed to call the office if not contacted within 2 weeks.
Hemostasis: Aluminum Chloride
Depth Of Biopsy: dermis
Billing Type: Third-Party Bill
Dressing: bandage
Detail Level: Detailed

## 2020-07-20 ENCOUNTER — RX ONLY (OUTPATIENT)
Age: 70
Setting detail: RX ONLY
End: 2020-07-20

## 2020-07-20 RX ORDER — CEPHALEXIN 500 MG/1
CAPSULE ORAL
Qty: 6 | Refills: 0 | Status: ERX

## 2020-07-30 ENCOUNTER — APPOINTMENT (RX ONLY)
Dept: URBAN - METROPOLITAN AREA CLINIC 23 | Facility: CLINIC | Age: 70
Setting detail: DERMATOLOGY
End: 2020-07-30

## 2020-07-30 PROBLEM — C44.629 SQUAMOUS CELL CARCINOMA OF SKIN OF LEFT UPPER LIMB, INCLUDING SHOULDER: Status: ACTIVE | Noted: 2020-07-30

## 2020-07-30 PROBLEM — Z85.828 PERSONAL HISTORY OF OTHER MALIGNANT NEOPLASM OF SKIN: Status: ACTIVE | Noted: 2020-07-30

## 2020-07-30 PROBLEM — L85.3 XEROSIS CUTIS: Status: ACTIVE | Noted: 2020-07-30

## 2020-07-30 PROCEDURE — ? EXCISION

## 2020-07-30 PROCEDURE — ? OTHER

## 2020-07-30 PROCEDURE — 11602 EXC TR-EXT MAL+MARG 1.1-2 CM: CPT

## 2020-07-30 PROCEDURE — 12032 INTMD RPR S/A/T/EXT 2.6-7.5: CPT

## 2020-07-30 NOTE — PROCEDURE: EXCISION
Peng Advancement Flap Text: The defect edges were debeveled with a #15 scalpel blade.  Given the location of the defect, shape of the defect and the proximity to free margins a Peng advancement flap was deemed most appropriate.  Using a sterile surgical marker, an appropriate advancement flap was drawn incorporating the defect and placing the expected incisions within the relaxed skin tension lines where possible. The area thus outlined was incised deep to adipose tissue with a #15 scalpel blade.  The skin margins were undermined to an appropriate distance in all directions utilizing iris scissors.
O-Z Flap Text: The defect edges were debeveled with a #15 scalpel blade.  Given the location of the defect, shape of the defect and the proximity to free margins an O-Z flap was deemed most appropriate.  Using a sterile surgical marker, an appropriate transposition flap was drawn incorporating the defect and placing the expected incisions within the relaxed skin tension lines where possible. The area thus outlined was incised deep to adipose tissue with a #15 scalpel blade.  The skin margins were undermined to an appropriate distance in all directions utilizing iris scissors.
Show Pathology Comment Variable: Yes
Advancement Flap (Double) Text: The defect edges were debeveled with a #15 scalpel blade.  Given the location of the defect and the proximity to free margins a double advancement flap was deemed most appropriate.  Using a sterile surgical marker, the appropriate advancement flaps were drawn incorporating the defect and placing the expected incisions within the relaxed skin tension lines where possible.    The area thus outlined was incised deep to adipose tissue with a #15 scalpel blade.  The skin margins were undermined to an appropriate distance in all directions utilizing iris scissors.
Slit Excision Additional Text (Leave Blank If You Do Not Want): A linear line was drawn on the skin overlying the lesion. An incision was made slowly until the lesion was visualized.  Once visualized, the lesion was removed with blunt dissection.
Length To Time In Minutes Device Was In Place: 10
Melolabial Transposition Flap Text: The defect edges were debeveled with a #15 scalpel blade.  Given the location of the defect and the proximity to free margins a melolabial flap was deemed most appropriate.  Using a sterile surgical marker, an appropriate melolabial transposition flap was drawn incorporating the defect.    The area thus outlined was incised deep to adipose tissue with a #15 scalpel blade.  The skin margins were undermined to an appropriate distance in all directions utilizing iris scissors.
Include Suturegard In Note?: No
Hemigard Intro: Due to skin fragility and wound tension, it was decided to use HEMIGARD adhesive retention suture devices to permit a linear closure. The skin was cleaned and dried for a 6cm distance away from the wound. Excessive hair, if present, was removed to allow for adhesion.
Retention Suture Text: Retention sutures were placed to support the closure and prevent dehiscence.
Complex Repair And O-T Advancement Flap Text: The defect edges were debeveled with a #15 scalpel blade.  The primary defect was closed partially with a complex linear closure.  Given the location of the remaining defect, shape of the defect and the proximity to free margins an O-T advancement flap was deemed most appropriate for complete closure of the defect.  Using a sterile surgical marker, an appropriate advancement flap was drawn incorporating the defect and placing the expected incisions within the relaxed skin tension lines where possible.    The area thus outlined was incised deep to adipose tissue with a #15 scalpel blade.  The skin margins were undermined to an appropriate distance in all directions utilizing iris scissors.
Purse String (Simple) Text: Given the location of the defect and the characteristics of the surrounding skin a purse string simple closure was deemed most appropriate.  Undermining was performed circumferentially around the surgical defect.  A purse string suture was then placed and tightened.
Complex Repair And Dermal Autograft Text: The defect edges were debeveled with a #15 scalpel blade.  The primary defect was closed partially with a complex linear closure.  Given the location of the defect, shape of the defect and the proximity to free margins an dermal autograft was deemed most appropriate to repair the remaining defect.  The graft was trimmed to fit the size of the remaining defect.  The graft was then placed in the primary defect, oriented appropriately, and sutured into place.
Complex Repair And W Plasty Text: The defect edges were debeveled with a #15 scalpel blade.  The primary defect was closed partially with a complex linear closure.  Given the location of the remaining defect, shape of the defect and the proximity to free margins a W plasty was deemed most appropriate for complete closure of the defect.  Using a sterile surgical marker, an appropriate advancement flap was drawn incorporating the defect and placing the expected incisions within the relaxed skin tension lines where possible.    The area thus outlined was incised deep to adipose tissue with a #15 scalpel blade.  The skin margins were undermined to an appropriate distance in all directions utilizing iris scissors.
Xenograft Text: The defect edges were debeveled with a #15 scalpel blade.  Given the location of the defect, shape of the defect and the proximity to free margins a xenograft was deemed most appropriate.  The graft was then trimmed to fit the size of the defect.  The graft was then placed in the primary defect and oriented appropriately.
Lazy S Intermediate Repair Preamble Text (Leave Blank If You Do Not Want): Undermining was performed with blunt dissection.
Melolabial Interpolation Flap Text: A decision was made to reconstruct the defect utilizing an interpolation axial flap and a staged reconstruction.  A telfa template was made of the defect.  This telfa template was then used to outline the melolabial interpolation flap.  The donor area for the pedicle flap was then injected with anesthesia.  The flap was excised through the skin and subcutaneous tissue down to the layer of the underlying musculature.  The pedicle flap was carefully excised within this deep plane to maintain its blood supply.  The edges of the donor site were undermined.   The donor site was closed in a primary fashion.  The pedicle was then rotated into position and sutured.  Once the tube was sutured into place, adequate blood supply was confirmed with blanching and refill.  The pedicle was then wrapped with xeroform gauze and dressed appropriately with a telfa and gauze bandage to ensure continued blood supply and protect the attached pedicle.
Burow's Graft Text: The defect edges were debeveled with a #15 scalpel blade.  Given the location of the defect, shape of the defect, the proximity to free margins and the presence of a standing cone deformity a Burow's skin graft was deemed most appropriate. The standing cone was removed and this tissue was then trimmed to the shape of the primary defect. The adipose tissue was also removed until only dermis and epidermis were left.  The skin margins of the secondary defect were undermined to an appropriate distance in all directions utilizing iris scissors.  The secondary defect was closed with interrupted buried subcutaneous sutures.  The skin edges were then re-apposed with running  sutures.  The skin graft was then placed in the primary defect and oriented appropriately.
Primary Defect Width (In Cm): 0
Anesthesia Type: 1% lidocaine with epinephrine
Scalpel Size: 15 blade
H Plasty Text: Given the location of the defect, shape of the defect and the proximity to free margins a H-plasty was deemed most appropriate for repair.  Using a sterile surgical marker, the appropriate advancement arms of the H-plasty were drawn incorporating the defect and placing the expected incisions within the relaxed skin tension lines where possible. The area thus outlined was incised deep to adipose tissue with a #15 scalpel blade. The skin margins were undermined to an appropriate distance in all directions utilizing iris scissors.  The opposing advancement arms were then advanced into place in opposite direction and anchored with interrupted buried subcutaneous sutures.
Double Island Pedicle Flap Text: The defect edges were debeveled with a #15 scalpel blade.  Given the location of the defect, shape of the defect and the proximity to free margins a double island pedicle advancement flap was deemed most appropriate.  Using a sterile surgical marker, an appropriate advancement flap was drawn incorporating the defect, outlining the appropriate donor tissue and placing the expected incisions within the relaxed skin tension lines where possible.    The area thus outlined was incised deep to adipose tissue with a #15 scalpel blade.  The skin margins were undermined to an appropriate distance in all directions around the primary defect and laterally outward around the island pedicle utilizing iris scissors.  There was minimal undermining beneath the pedicle flap.
Bilobed Flap Text: The defect edges were debeveled with a #15 scalpel blade.  Given the location of the defect and the proximity to free margins a bilobe flap was deemed most appropriate.  Using a sterile surgical marker, an appropriate bilobe flap drawn around the defect.    The area thus outlined was incised deep to adipose tissue with a #15 scalpel blade.  The skin margins were undermined to an appropriate distance in all directions utilizing iris scissors.
Positioning (Leave Blank If You Do Not Want): The patient was placed in a comfortable position exposing the surgical site.
Spiral Flap Text: The defect edges were debeveled with a #15 scalpel blade.  Given the location of the defect, shape of the defect and the proximity to free margins a spiral flap was deemed most appropriate.  Using a sterile surgical marker, an appropriate rotation flap was drawn incorporating the defect and placing the expected incisions within the relaxed skin tension lines where possible. The area thus outlined was incised deep to adipose tissue with a #15 scalpel blade.  The skin margins were undermined to an appropriate distance in all directions utilizing iris scissors.
Size Of Lesion In Cm: 1
Advancement-Rotation Flap Text: The defect edges were debeveled with a #15 scalpel blade.  Given the location of the defect, shape of the defect and the proximity to free margins an advancement-rotation flap was deemed most appropriate.  Using a sterile surgical marker, an appropriate flap was drawn incorporating the defect and placing the expected incisions within the relaxed skin tension lines where possible. The area thus outlined was incised deep to adipose tissue with a #15 scalpel blade.  The skin margins were undermined to an appropriate distance in all directions utilizing iris scissors.
Crescentic Advancement Flap Text: The defect edges were debeveled with a #15 scalpel blade.  Given the location of the defect and the proximity to free margins a crescentic advancement flap was deemed most appropriate.  Using a sterile surgical marker, the appropriate advancement flap was drawn incorporating the defect and placing the expected incisions within the relaxed skin tension lines where possible.    The area thus outlined was incised deep to adipose tissue with a #15 scalpel blade.  The skin margins were undermined to an appropriate distance in all directions utilizing iris scissors.
Estimated Blood Loss (Cc): minimal
Complex Repair And Melolabial Flap Text: The defect edges were debeveled with a #15 scalpel blade.  The primary defect was closed partially with a complex linear closure.  Given the location of the remaining defect, shape of the defect and the proximity to free margins a melolabial flap was deemed most appropriate for complete closure of the defect.  Using a sterile surgical marker, an appropriate advancement flap was drawn incorporating the defect and placing the expected incisions within the relaxed skin tension lines where possible.    The area thus outlined was incised deep to adipose tissue with a #15 scalpel blade.  The skin margins were undermined to an appropriate distance in all directions utilizing iris scissors.
Complex Repair And Single Advancement Flap Text: The defect edges were debeveled with a #15 scalpel blade.  The primary defect was closed partially with a complex linear closure.  Given the location of the remaining defect, shape of the defect and the proximity to free margins a single advancement flap was deemed most appropriate for complete closure of the defect.  Using a sterile surgical marker, an appropriate advancement flap was drawn incorporating the defect and placing the expected incisions within the relaxed skin tension lines where possible.    The area thus outlined was incised deep to adipose tissue with a #15 scalpel blade.  The skin margins were undermined to an appropriate distance in all directions utilizing iris scissors.
Hemigard Postcare Instructions: The HEMIGARD strips are to remain completely dry for at least 5-7 days.
Excision Depth: adipose tissue
Billing Type: Third-Party Bill
Complex Repair And O-L Flap Text: The defect edges were debeveled with a #15 scalpel blade.  The primary defect was closed partially with a complex linear closure.  Given the location of the remaining defect, shape of the defect and the proximity to free margins an O-L flap was deemed most appropriate for complete closure of the defect.  Using a sterile surgical marker, an appropriate flap was drawn incorporating the defect and placing the expected incisions within the relaxed skin tension lines where possible.    The area thus outlined was incised deep to adipose tissue with a #15 scalpel blade.  The skin margins were undermined to an appropriate distance in all directions utilizing iris scissors.
Partial Purse String (Intermediate) Text: Given the location of the defect and the characteristics of the surrounding skin an intermediate purse string closure was deemed most appropriate.  Undermining was performed circumferentially around the surgical defect.  A purse string suture was then placed and tightened. Wound tension of the circular defect prevented complete closure of the wound.
Detail Level: Detailed
Hemostasis: Electrocautery
Cartilage Graft Text: The defect edges were debeveled with a #15 scalpel blade.  Given the location of the defect, shape of the defect, the fact the defect involved a full thickness cartilage defect a cartilage graft was deemed most appropriate.  An appropriate donor site was identified, cleansed, and anesthetized. The cartilage graft was then harvested and transferred to the recipient site, oriented appropriately and then sutured into place.  The secondary defect was then repaired using a primary closure.
Purse String (Intermediate) Text: Given the location of the defect and the characteristics of the surrounding skin a pursestring intermediate closure was deemed most appropriate.  Undermining was performed circumfirentially around the surgical defect.  A purstring suture was then placed and tightened.
Mastoid Interpolation Flap Text: A decision was made to reconstruct the defect utilizing an interpolation axial flap and a staged reconstruction.  A telfa template was made of the defect.  This telfa template was then used to outline the mastoid interpolation flap.  The donor area for the pedicle flap was then injected with anesthesia.  The flap was excised through the skin and subcutaneous tissue down to the layer of the underlying musculature.  The pedicle flap was carefully excised within this deep plane to maintain its blood supply.  The edges of the donor site were undermined.   The donor site was closed in a primary fashion.  The pedicle was then rotated into position and sutured.  Once the tube was sutured into place, adequate blood supply was confirmed with blanching and refill.  The pedicle was then wrapped with xeroform gauze and dressed appropriately with a telfa and gauze bandage to ensure continued blood supply and protect the attached pedicle.
Complex Repair And Tissue Cultured Epidermal Autograft Text: The defect edges were debeveled with a #15 scalpel blade.  The primary defect was closed partially with a complex linear closure.  Given the location of the defect, shape of the defect and the proximity to free margins an tissue cultured epidermal autograft was deemed most appropriate to repair the remaining defect.  The graft was trimmed to fit the size of the remaining defect.  The graft was then placed in the primary defect, oriented appropriately, and sutured into place.
Complex Repair And Z Plasty Text: The defect edges were debeveled with a #15 scalpel blade.  The primary defect was closed partially with a complex linear closure.  Given the location of the remaining defect, shape of the defect and the proximity to free margins a Z plasty was deemed most appropriate for complete closure of the defect.  Using a sterile surgical marker, an appropriate advancement flap was drawn incorporating the defect and placing the expected incisions within the relaxed skin tension lines where possible.    The area thus outlined was incised deep to adipose tissue with a #15 scalpel blade.  The skin margins were undermined to an appropriate distance in all directions utilizing iris scissors.
Intermediate / Complex Repair - Final Wound Length In Cm: 4.5
Where Do You Want The Question To Include Opioid Counseling Located?: Case Summary Tab
Star Wedge Flap Text: The defect edges were debeveled with a #15 scalpel blade.  Given the location of the defect, shape of the defect and the proximity to free margins a star wedge flap was deemed most appropriate.  Using a sterile surgical marker, an appropriate rotation flap was drawn incorporating the defect and placing the expected incisions within the relaxed skin tension lines where possible. The area thus outlined was incised deep to adipose tissue with a #15 scalpel blade.  The skin margins were undermined to an appropriate distance in all directions utilizing iris scissors.
W Plasty Text: The lesion was extirpated to the level of the fat with a #15 scalpel blade.  Given the location of the defect, shape of the defect and the proximity to free margins a W-plasty was deemed most appropriate for repair.  Using a sterile surgical marker, the appropriate transposition arms of the W-plasty were drawn incorporating the defect and placing the expected incisions within the relaxed skin tension lines where possible.    The area thus outlined was incised deep to adipose tissue with a #15 scalpel blade.  The skin margins were undermined to an appropriate distance in all directions utilizing iris scissors.  The opposing transposition arms were then transposed into place in opposite direction and anchored with interrupted buried subcutaneous sutures.
Island Pedicle Flap-Requiring Vessel Identification Text: The defect edges were debeveled with a #15 scalpel blade.  Given the location of the defect, shape of the defect and the proximity to free margins an island pedicle advancement flap was deemed most appropriate.  Using a sterile surgical marker, an appropriate advancement flap was drawn, based on the axial vessel mentioned above, incorporating the defect, outlining the appropriate donor tissue and placing the expected incisions within the relaxed skin tension lines where possible.    The area thus outlined was incised deep to adipose tissue with a #15 scalpel blade.  The skin margins were undermined to an appropriate distance in all directions around the primary defect and laterally outward around the island pedicle utilizing iris scissors.  There was minimal undermining beneath the pedicle flap.
Bilobed Transposition Flap Text: The defect edges were debeveled with a #15 scalpel blade.  Given the location of the defect and the proximity to free margins a bilobed transposition flap was deemed most appropriate.  Using a sterile surgical marker, an appropriate bilobe flap drawn around the defect.    The area thus outlined was incised deep to adipose tissue with a #15 scalpel blade.  The skin margins were undermined to an appropriate distance in all directions utilizing iris scissors.
Information: Selecting Yes will display possible errors in your note based on the variables you have selected. This validation is only offered as a suggestion for you. PLEASE NOTE THAT THE VALIDATION TEXT WILL BE REMOVED WHEN YOU FINALIZE YOUR NOTE. IF YOU WANT TO FAX A PRELIMINARY NOTE YOU WILL NEED TO TOGGLE THIS TO 'NO' IF YOU DO NOT WANT IT IN YOUR FAXED NOTE.
Pre-Excision Curettage Text (Leave Blank If You Do Not Want): Prior to drawing the surgical margin the visible lesion was removed with electrodesiccation and curettage to clearly define the lesion size.
Mercedes Flap Text: The defect edges were debeveled with a #15 scalpel blade.  Given the location of the defect, shape of the defect and the proximity to free margins a Mercedes flap was deemed most appropriate.  Using a sterile surgical marker, an appropriate advancement flap was drawn incorporating the defect and placing the expected incisions within the relaxed skin tension lines where possible. The area thus outlined was incised deep to adipose tissue with a #15 scalpel blade.  The skin margins were undermined to an appropriate distance in all directions utilizing iris scissors.
A-T Advancement Flap Text: The defect edges were debeveled with a #15 scalpel blade.  Given the location of the defect, shape of the defect and the proximity to free margins an A-T advancement flap was deemed most appropriate.  Using a sterile surgical marker, an appropriate advancement flap was drawn incorporating the defect and placing the expected incisions within the relaxed skin tension lines where possible.    The area thus outlined was incised deep to adipose tissue with a #15 scalpel blade.  The skin margins were undermined to an appropriate distance in all directions utilizing iris scissors.
Repair Performed By Another Provider Text (Leave Blank If You Do Not Want): After the tissue was excised the defect was repaired by another provider.
Complex Repair And Rotation Flap Text: The defect edges were debeveled with a #15 scalpel blade.  The primary defect was closed partially with a complex linear closure.  Given the location of the remaining defect, shape of the defect and the proximity to free margins a rotation flap was deemed most appropriate for complete closure of the defect.  Using a sterile surgical marker, an appropriate advancement flap was drawn incorporating the defect and placing the expected incisions within the relaxed skin tension lines where possible.    The area thus outlined was incised deep to adipose tissue with a #15 scalpel blade.  The skin margins were undermined to an appropriate distance in all directions utilizing iris scissors.
Complex Repair And Double Advancement Flap Text: The defect edges were debeveled with a #15 scalpel blade.  The primary defect was closed partially with a complex linear closure.  Given the location of the remaining defect, shape of the defect and the proximity to free margins a double advancement flap was deemed most appropriate for complete closure of the defect.  Using a sterile surgical marker, an appropriate advancement flap was drawn incorporating the defect and placing the expected incisions within the relaxed skin tension lines where possible.    The area thus outlined was incised deep to adipose tissue with a #15 scalpel blade.  The skin margins were undermined to an appropriate distance in all directions utilizing iris scissors.
Complex Repair And Burow's Graft Text: The defect edges were debeveled with a #15 scalpel blade.  The primary defect was closed partially with a complex linear closure.  Given the location of the defect, shape of the defect, the proximity to free margins and the presence of a standing cone deformity a Burow's graft was deemed most appropriate to repair the remaining defect.  The graft was trimmed to fit the size of the remaining defect.  The graft was then placed in the primary defect, oriented appropriately, and sutured into place.
Epidermal Closure: running
Dressing: pressure dressing
Epidermal Sutures: 4-0 Prolene
Cheek Interpolation Flap Text: A decision was made to reconstruct the defect utilizing an interpolation axial flap and a staged reconstruction.  A telfa template was made of the defect.  This telfa template was then used to outline the Cheek Interpolation flap.  The donor area for the pedicle flap was then injected with anesthesia.  The flap was excised through the skin and subcutaneous tissue down to the layer of the underlying musculature.  The interpolation flap was carefully excised within this deep plane to maintain its blood supply.  The edges of the donor site were undermined.   The donor site was closed in a primary fashion.  The pedicle was then rotated into position and sutured.  Once the tube was sutured into place, adequate blood supply was confirmed with blanching and refill.  The pedicle was then wrapped with xeroform gauze and dressed appropriately with a telfa and gauze bandage to ensure continued blood supply and protect the attached pedicle.
M-Plasty Complex Repair Preamble Text (Leave Blank If You Do Not Want): Extensive wide undermining was performed.
O-Z Plasty Text: The defect edges were debeveled with a #15 scalpel blade.  Given the location of the defect, shape of the defect and the proximity to free margins an O-Z plasty (double transposition flap) was deemed most appropriate.  Using a sterile surgical marker, the appropriate transposition flaps were drawn incorporating the defect and placing the expected incisions within the relaxed skin tension lines where possible.    The area thus outlined was incised deep to adipose tissue with a #15 scalpel blade.  The skin margins were undermined to an appropriate distance in all directions utilizing iris scissors.  Hemostasis was achieved with electrocautery.  The flaps were then transposed into place, one clockwise and the other counterclockwise, and anchored with interrupted buried subcutaneous sutures.
Nostril Rim Text: The closure involved the nostril rim.
Island Pedicle Flap Text: The defect edges were debeveled with a #15 scalpel blade.  Given the location of the defect, shape of the defect and the proximity to free margins an island pedicle advancement flap was deemed most appropriate.  Using a sterile surgical marker, an appropriate advancement flap was drawn incorporating the defect, outlining the appropriate donor tissue and placing the expected incisions within the relaxed skin tension lines where possible.    The area thus outlined was incised deep to adipose tissue with a #15 scalpel blade.  The skin margins were undermined to an appropriate distance in all directions around the primary defect and laterally outward around the island pedicle utilizing iris scissors.  There was minimal undermining beneath the pedicle flap.
Bilateral Helical Rim Advancement Flap Text: The defect edges were debeveled with a #15 blade scalpel.  Given the location of the defect and the proximity to free margins (helical rim) a bilateral helical rim advancement flap was deemed most appropriate.  Using a sterile surgical marker, the appropriate advancement flaps were drawn incorporating the defect and placing the expected incisions between the helical rim and antihelix where possible.  The area thus outlined was incised through and through with a #15 scalpel blade.  With a skin hook and iris scissors, the flaps were gently and sharply undermined and freed up.
Complex Repair And V-Y Plasty Text: The defect edges were debeveled with a #15 scalpel blade.  The primary defect was closed partially with a complex linear closure.  Given the location of the remaining defect, shape of the defect and the proximity to free margins a V-Y plasty was deemed most appropriate for complete closure of the defect.  Using a sterile surgical marker, an appropriate advancement flap was drawn incorporating the defect and placing the expected incisions within the relaxed skin tension lines where possible.    The area thus outlined was incised deep to adipose tissue with a #15 scalpel blade.  The skin margins were undermined to an appropriate distance in all directions utilizing iris scissors.
Fusiform Excision Additional Text (Leave Blank If You Do Not Want): The margin was drawn around the clinically apparent lesion.  A fusiform shape was then drawn on the skin incorporating the lesion and margins.  Incisions were then made along these lines to the appropriate tissue plane and the lesion was extirpated.
Lip Wedge Excision Repair Text: Given the location of the defect and the proximity to free margins a full thickness wedge repair was deemed most appropriate.  Using a sterile surgical marker, the appropriate repair was drawn incorporating the defect and placing the expected incisions perpendicular to the vermillion border.  The vermillion border was also meticulously outlined to ensure appropriate reapproximation during the repair.  The area thus outlined was incised through and through with a #15 scalpel blade.  The muscularis and dermis were reaproximated with deep sutures following hemostasis. Care was taken to realign the vermillion border before proceeding with the superficial closure.  Once the vermillion was realigned the superfical and mucosal closure was finished.
Dermal Autograft Text: The defect edges were debeveled with a #15 scalpel blade.  Given the location of the defect, shape of the defect and the proximity to free margins a dermal autograft was deemed most appropriate.  Using a sterile surgical marker, the primary defect shape was transferred to the donor site. The area thus outlined was incised deep to adipose tissue with a #15 scalpel blade.  The harvested graft was then trimmed of adipose and epidermal tissue until only dermis was left.  The skin graft was then placed in the primary defect and oriented appropriately.
Debridement Text: The wound edges were debrided prior to proceeding with the closure to facilitate wound healing.
Partial Purse String (Simple) Text: Given the location of the defect and the characteristics of the surrounding skin a simple purse string closure was deemed most appropriate.  Undermining was performed circumferentially around the surgical defect.  A purse string suture was then placed and tightened. Wound tension of the circular defect prevented complete closure of the wound.
Composite Graft Text: The defect edges were debeveled with a #15 scalpel blade.  Given the location of the defect, shape of the defect, the proximity to free margins and the fact the defect was full thickness a composite graft was deemed most appropriate.  The defect was outline and then transferred to the donor site.  A full thickness graft was then excised from the donor site. The graft was then placed in the primary defect, oriented appropriately and then sutured into place.  The secondary defect was then repaired using a primary closure.
Posterior Auricular Interpolation Flap Text: A decision was made to reconstruct the defect utilizing an interpolation axial flap and a staged reconstruction.  A telfa template was made of the defect.  This telfa template was then used to outline the posterior auricular interpolation flap.  The donor area for the pedicle flap was then injected with anesthesia.  The flap was excised through the skin and subcutaneous tissue down to the layer of the underlying musculature.  The pedicle flap was carefully excised within this deep plane to maintain its blood supply.  The edges of the donor site were undermined.   The donor site was closed in a primary fashion.  The pedicle was then rotated into position and sutured.  Once the tube was sutured into place, adequate blood supply was confirmed with blanching and refill.  The pedicle was then wrapped with xeroform gauze and dressed appropriately with a telfa and gauze bandage to ensure continued blood supply and protect the attached pedicle.
Z Plasty Text: The lesion was extirpated to the level of the fat with a #15 scalpel blade.  Given the location of the defect, shape of the defect and the proximity to free margins a Z-plasty was deemed most appropriate for repair.  Using a sterile surgical marker, the appropriate transposition arms of the Z-plasty were drawn incorporating the defect and placing the expected incisions within the relaxed skin tension lines where possible.    The area thus outlined was incised deep to adipose tissue with a #15 scalpel blade.  The skin margins were undermined to an appropriate distance in all directions utilizing iris scissors.  The opposing transposition arms were then transposed into place in opposite direction and anchored with interrupted buried subcutaneous sutures.
Complex Repair And Dorsal Nasal Flap Text: The defect edges were debeveled with a #15 scalpel blade.  The primary defect was closed partially with a complex linear closure.  Given the location of the remaining defect, shape of the defect and the proximity to free margins a dorsal nasal flap was deemed most appropriate for complete closure of the defect.  Using a sterile surgical marker, an appropriate flap was drawn incorporating the defect and placing the expected incisions within the relaxed skin tension lines where possible.    The area thus outlined was incised deep to adipose tissue with a #15 scalpel blade.  The skin margins were undermined to an appropriate distance in all directions utilizing iris scissors.
Transposition Flap Text: The defect edges were debeveled with a #15 scalpel blade.  Given the location of the defect and the proximity to free margins a transposition flap was deemed most appropriate.  Using a sterile surgical marker, an appropriate transposition flap was drawn incorporating the defect.    The area thus outlined was incised deep to adipose tissue with a #15 scalpel blade.  The skin margins were undermined to an appropriate distance in all directions utilizing iris scissors.
Size Of Margin In Cm: 0.3
Modified Advancement Flap Text: The defect edges were debeveled with a #15 scalpel blade.  Given the location of the defect, shape of the defect and the proximity to free margins a modified advancement flap was deemed most appropriate.  Using a sterile surgical marker, an appropriate advancement flap was drawn incorporating the defect and placing the expected incisions within the relaxed skin tension lines where possible.    The area thus outlined was incised deep to adipose tissue with a #15 scalpel blade.  The skin margins were undermined to an appropriate distance in all directions utilizing iris scissors.
Accession #: [id number]
O-T Advancement Flap Text: The defect edges were debeveled with a #15 scalpel blade.  Given the location of the defect, shape of the defect and the proximity to free margins an O-T advancement flap was deemed most appropriate.  Using a sterile surgical marker, an appropriate advancement flap was drawn incorporating the defect and placing the expected incisions within the relaxed skin tension lines where possible.    The area thus outlined was incised deep to adipose tissue with a #15 scalpel blade.  The skin margins were undermined to an appropriate distance in all directions utilizing iris scissors.
Trilobed Flap Text: The defect edges were debeveled with a #15 scalpel blade.  Given the location of the defect and the proximity to free margins a trilobed flap was deemed most appropriate.  Using a sterile surgical marker, an appropriate trilobed flap drawn around the defect.    The area thus outlined was incised deep to adipose tissue with a #15 scalpel blade.  The skin margins were undermined to an appropriate distance in all directions utilizing iris scissors.
Helical Rim Text: The closure involved the helical rim.
Keystone Flap Text: The defect edges were debeveled with a #15 scalpel blade.  Given the location of the defect, shape of the defect a keystone flap was deemed most appropriate.  Using a sterile surgical marker, an appropriate keystone flap was drawn incorporating the defect, outlining the appropriate donor tissue and placing the expected incisions within the relaxed skin tension lines where possible. The area thus outlined was incised deep to adipose tissue with a #15 scalpel blade.  The skin margins were undermined to an appropriate distance in all directions around the primary defect and laterally outward around the flap utilizing iris scissors.
Bi-Rhombic Flap Text: The defect edges were debeveled with a #15 scalpel blade.  Given the location of the defect and the proximity to free margins a bi-rhombic flap was deemed most appropriate.  Using a sterile surgical marker, an appropriate rhombic flap was drawn incorporating the defect. The area thus outlined was incised deep to adipose tissue with a #15 scalpel blade.  The skin margins were undermined to an appropriate distance in all directions utilizing iris scissors.
Graft Donor Site Bandage (Optional-Leave Blank If You Don't Want In Note): Steri-strips and a pressure bandage were applied to the donor site.
Anesthesia Volume In Cc: 5
Wound Care: Vaseline
Suturegard Intro: Intraoperative tissue expansion was performed, utilizing the SUTUREGARD device, in order to reduce wound tension.
Epidermal Closure Graft Donor Site (Optional): simple interrupted
Deep Sutures: 4-0 Vicryl
No Repair - Repaired With Adjacent Surgical Defect Text (Leave Blank If You Do Not Want): After the excision the defect was repaired concurrently with another surgical defect which was in close approximation.
Consent was obtained from the patient. The risks and benefits to therapy were discussed in detail. Specifically, the risks of infection, scarring, bleeding, prolonged wound healing, incomplete removal, allergy to anesthesia, nerve injury and recurrence were addressed. Prior to the procedure, the treatment site was clearly identified and confirmed by the patient. All components of Universal Protocol/PAUSE Rule completed.
Complex Repair And Modified Advancement Flap Text: The defect edges were debeveled with a #15 scalpel blade.  The primary defect was closed partially with a complex linear closure.  Given the location of the remaining defect, shape of the defect and the proximity to free margins a modified advancement flap was deemed most appropriate for complete closure of the defect.  Using a sterile surgical marker, an appropriate advancement flap was drawn incorporating the defect and placing the expected incisions within the relaxed skin tension lines where possible.    The area thus outlined was incised deep to adipose tissue with a #15 scalpel blade.  The skin margins were undermined to an appropriate distance in all directions utilizing iris scissors.
Post-Care Instructions: I reviewed with the patient in detail post-care instructions. Patient is not to engage in any heavy lifting, exercise, or swimming for the next 7 days. Should the patient develop any fevers, chills, bleeding, severe pain patient will contact the office immediately.
Complex Repair And Rhombic Flap Text: The defect edges were debeveled with a #15 scalpel blade.  The primary defect was closed partially with a complex linear closure.  Given the location of the remaining defect, shape of the defect and the proximity to free margins a rhombic flap was deemed most appropriate for complete closure of the defect.  Using a sterile surgical marker, an appropriate advancement flap was drawn incorporating the defect and placing the expected incisions within the relaxed skin tension lines where possible.    The area thus outlined was incised deep to adipose tissue with a #15 scalpel blade.  The skin margins were undermined to an appropriate distance in all directions utilizing iris scissors.
Hemigard Retention Suture: 2-0 Nylon
Retention Suture Bite Size: 3 mm
Repair Type: Intermediate
Complex Repair And Split-Thickness Skin Graft Text: The defect edges were debeveled with a #15 scalpel blade.  The primary defect was closed partially with a complex linear closure.  Given the location of the defect, shape of the defect and the proximity to free margins a split thickness skin graft was deemed most appropriate to repair the remaining defect.  The graft was trimmed to fit the size of the remaining defect.  The graft was then placed in the primary defect, oriented appropriately, and sutured into place.
Complex Repair And M Plasty Text: The defect edges were debeveled with a #15 scalpel blade.  The primary defect was closed partially with a complex linear closure.  Given the location of the remaining defect, shape of the defect and the proximity to free margins an M plasty was deemed most appropriate for complete closure of the defect.  Using a sterile surgical marker, an appropriate advancement flap was drawn incorporating the defect and placing the expected incisions within the relaxed skin tension lines where possible.    The area thus outlined was incised deep to adipose tissue with a #15 scalpel blade.  The skin margins were undermined to an appropriate distance in all directions utilizing iris scissors.
Undermining Type: Entire Wound
Double O-Z Plasty Text: The defect edges were debeveled with a #15 scalpel blade.  Given the location of the defect, shape of the defect and the proximity to free margins a Double O-Z plasty (double transposition flap) was deemed most appropriate.  Using a sterile surgical marker, the appropriate transposition flaps were drawn incorporating the defect and placing the expected incisions within the relaxed skin tension lines where possible. The area thus outlined was incised deep to adipose tissue with a #15 scalpel blade.  The skin margins were undermined to an appropriate distance in all directions utilizing iris scissors.  Hemostasis was achieved with electrocautery.  The flaps were then transposed into place, one clockwise and the other counterclockwise, and anchored with interrupted buried subcutaneous sutures.
Island Pedicle Flap With Canthal Suspension Text: The defect edges were debeveled with a #15 scalpel blade.  Given the location of the defect, shape of the defect and the proximity to free margins an island pedicle advancement flap was deemed most appropriate.  Using a sterile surgical marker, an appropriate advancement flap was drawn incorporating the defect, outlining the appropriate donor tissue and placing the expected incisions within the relaxed skin tension lines where possible. The area thus outlined was incised deep to adipose tissue with a #15 scalpel blade.  The skin margins were undermined to an appropriate distance in all directions around the primary defect and laterally outward around the island pedicle utilizing iris scissors.  There was minimal undermining beneath the pedicle flap. A suspension suture was placed in the canthal tendon to prevent tension and prevent ectropion.
Ear Star Wedge Flap Text: The defect edges were debeveled with a #15 blade scalpel.  Given the location of the defect and the proximity to free margins (helical rim) an ear star wedge flap was deemed most appropriate.  Using a sterile surgical marker, the appropriate flap was drawn incorporating the defect and placing the expected incisions between the helical rim and antihelix where possible.  The area thus outlined was incised through and through with a #15 scalpel blade.
Skin Substitute Text: The defect edges were debeveled with a #15 scalpel blade.  Given the location of the defect, shape of the defect and the proximity to free margins a skin substitute graft was deemed most appropriate.  The graft material was trimmed to fit the size of the defect. The graft was then placed in the primary defect and oriented appropriately.
Elliptical Excision Additional Text (Leave Blank If You Do Not Want): The margin was drawn around the clinically apparent lesion.  An elliptical shape was then drawn on the skin incorporating the lesion and margins.  Incisions were then made along these lines to the appropriate tissue plane and the lesion was extirpated.
Ftsg Text: The defect edges were debeveled with a #15 scalpel blade.  Given the location of the defect, shape of the defect and the proximity to free margins a full thickness skin graft was deemed most appropriate.  Using a sterile surgical marker, the primary defect shape was transferred to the donor site. The area thus outlined was incised deep to adipose tissue with a #15 scalpel blade.  The harvested graft was then trimmed of adipose tissue until only dermis and epidermis was left.  The skin margins of the secondary defect were undermined to an appropriate distance in all directions utilizing iris scissors.  The secondary defect was closed with interrupted buried subcutaneous sutures.  The skin edges were then re-apposed with running  sutures.  The skin graft was then placed in the primary defect and oriented appropriately.
Cheek-To-Nose Interpolation Flap Text: A decision was made to reconstruct the defect utilizing an interpolation axial flap and a staged reconstruction.  A telfa template was made of the defect.  This telfa template was then used to outline the Cheek-To-Nose Interpolation flap.  The donor area for the pedicle flap was then injected with anesthesia.  The flap was excised through the skin and subcutaneous tissue down to the layer of the underlying musculature.  The interpolation flap was carefully excised within this deep plane to maintain its blood supply.  The edges of the donor site were undermined.   The donor site was closed in a primary fashion.  The pedicle was then rotated into position and sutured.  Once the tube was sutured into place, adequate blood supply was confirmed with blanching and refill.  The pedicle was then wrapped with xeroform gauze and dressed appropriately with a telfa and gauze bandage to ensure continued blood supply and protect the attached pedicle.
Double O-Z Flap Text: The defect edges were debeveled with a #15 scalpel blade.  Given the location of the defect, shape of the defect and the proximity to free margins a Double O-Z flap was deemed most appropriate.  Using a sterile surgical marker, an appropriate transposition flap was drawn incorporating the defect and placing the expected incisions within the relaxed skin tension lines where possible. The area thus outlined was incised deep to adipose tissue with a #15 scalpel blade.  The skin margins were undermined to an appropriate distance in all directions utilizing iris scissors.
Burow's Advancement Flap Text: The defect edges were debeveled with a #15 scalpel blade.  Given the location of the defect and the proximity to free margins a Burow's advancement flap was deemed most appropriate.  Using a sterile surgical marker, the appropriate advancement flap was drawn incorporating the defect and placing the expected incisions within the relaxed skin tension lines where possible.    The area thus outlined was incised deep to adipose tissue with a #15 scalpel blade.  The skin margins were undermined to an appropriate distance in all directions utilizing iris scissors.
Rhombic Flap Text: The defect edges were debeveled with a #15 scalpel blade.  Given the location of the defect and the proximity to free margins a rhombic flap was deemed most appropriate.  Using a sterile surgical marker, an appropriate rhombic flap was drawn incorporating the defect.    The area thus outlined was incised deep to adipose tissue with a #15 scalpel blade.  The skin margins were undermined to an appropriate distance in all directions utilizing iris scissors.
Hatchet Flap Text: The defect edges were debeveled with a #15 scalpel blade.  Given the location of the defect, shape of the defect and the proximity to free margins a hatchet flap was deemed most appropriate.  Using a sterile surgical marker, an appropriate hatchet flap was drawn incorporating the defect and placing the expected incisions within the relaxed skin tension lines where possible.    The area thus outlined was incised deep to adipose tissue with a #15 scalpel blade.  The skin margins were undermined to an appropriate distance in all directions utilizing iris scissors.
Curvilinear Excision Additional Text (Leave Blank If You Do Not Want): The margin was drawn around the clinically apparent lesion.  A curvilinear shape was then drawn on the skin incorporating the lesion and margins.  Incisions were then made along these lines to the appropriate tissue plane and the lesion was extirpated.
Epidermal Autograft Text: The defect edges were debeveled with a #15 scalpel blade.  Given the location of the defect, shape of the defect and the proximity to free margins an epidermal autograft was deemed most appropriate.  Using a sterile surgical marker, the primary defect shape was transferred to the donor site. The epidermal graft was then harvested.  The skin graft was then placed in the primary defect and oriented appropriately.
Paramedian Forehead Flap Text: A decision was made to reconstruct the defect utilizing an interpolation axial flap and a staged reconstruction.  A telfa template was made of the defect.  This telfa template was then used to outline the paramedian forehead pedicle flap.  The donor area for the pedicle flap was then injected with anesthesia.  The flap was excised through the skin and subcutaneous tissue down to the layer of the underlying musculature.  The pedicle flap was carefully excised within this deep plane to maintain its blood supply.  The edges of the donor site were undermined.   The donor site was closed in a primary fashion.  The pedicle was then rotated into position and sutured.  Once the tube was sutured into place, adequate blood supply was confirmed with blanching and refill.  The pedicle was then wrapped with xeroform gauze and dressed appropriately with a telfa and gauze bandage to ensure continued blood supply and protect the attached pedicle.
O-T Plasty Text: The defect edges were debeveled with a #15 scalpel blade.  Given the location of the defect, shape of the defect and the proximity to free margins an O-T plasty was deemed most appropriate.  Using a sterile surgical marker, an appropriate O-T plasty was drawn incorporating the defect and placing the expected incisions within the relaxed skin tension lines where possible.    The area thus outlined was incised deep to adipose tissue with a #15 scalpel blade.  The skin margins were undermined to an appropriate distance in all directions utilizing iris scissors.
Zygomaticofacial Flap Text: Given the location of the defect, shape of the defect and the proximity to free margins a zygomaticofacial flap was deemed most appropriate for repair.  Using a sterile surgical marker, the appropriate flap was drawn incorporating the defect and placing the expected incisions within the relaxed skin tension lines where possible. The area thus outlined was incised deep to adipose tissue with a #15 scalpel blade with preservation of a vascular pedicle.  The skin margins were undermined to an appropriate distance in all directions utilizing iris scissors.  The flap was then placed into the defect and anchored with interrupted buried subcutaneous sutures.
Vermilion Border Text: The closure involved the vermilion border.
Dorsal Nasal Flap Text: The defect edges were debeveled with a #15 scalpel blade.  Given the location of the defect and the proximity to free margins a dorsal nasal flap was deemed most appropriate.  Using a sterile surgical marker, an appropriate dorsal nasal flap was drawn around the defect.    The area thus outlined was incised deep to adipose tissue with a #15 scalpel blade.  The skin margins were undermined to an appropriate distance in all directions utilizing iris scissors.
Complex Repair And Skin Substitute Graft Text: The defect edges were debeveled with a #15 scalpel blade.  The primary defect was closed partially with a complex linear closure.  Given the location of the remaining defect, shape of the defect and the proximity to free margins a skin substitute graft was deemed most appropriate to repair the remaining defect.  The graft was trimmed to fit the size of the remaining defect.  The graft was then placed in the primary defect, oriented appropriately, and sutured into place.
Complex Repair And Ftsg Text: The defect edges were debeveled with a #15 scalpel blade.  The primary defect was closed partially with a complex linear closure.  Given the location of the defect, shape of the defect and the proximity to free margins a full thickness skin graft was deemed most appropriate to repair the remaining defect.  The graft was trimmed to fit the size of the remaining defect.  The graft was then placed in the primary defect, oriented appropriately, and sutured into place.
Muscle Hinge Flap Text: The defect edges were debeveled with a #15 scalpel blade.  Given the size, depth and location of the defect and the proximity to free margins a muscle hinge flap was deemed most appropriate.  Using a sterile surgical marker, an appropriate hinge flap was drawn incorporating the defect. The area thus outlined was incised with a #15 scalpel blade.  The skin margins were undermined to an appropriate distance in all directions utilizing iris scissors.
Excision Method: Elliptical
Mucosal Advancement Flap Text: Given the location of the defect, shape of the defect and the proximity to free margins a mucosal advancement flap was deemed most appropriate. Incisions were made with a 15 blade scalpel in the appropriate fashion along the cutaneous vermillion border and the mucosal lip. The remaining actinically damaged mucosal tissue was excised.  The mucosal advancement flap was then elevated to the gingival sulcus with care taken to preserve the neurovascular structures and advanced into the primary defect. Care was taken to ensure that precise realignment of the vermillion border was achieved.
O-L Flap Text: The defect edges were debeveled with a #15 scalpel blade.  Given the location of the defect, shape of the defect and the proximity to free margins an O-L flap was deemed most appropriate.  Using a sterile surgical marker, an appropriate advancement flap was drawn incorporating the defect and placing the expected incisions within the relaxed skin tension lines where possible.    The area thus outlined was incised deep to adipose tissue with a #15 scalpel blade.  The skin margins were undermined to an appropriate distance in all directions utilizing iris scissors.
Advancement Flap (Single) Text: The defect edges were debeveled with a #15 scalpel blade.  Given the location of the defect and the proximity to free margins a single advancement flap was deemed most appropriate.  Using a sterile surgical marker, an appropriate advancement flap was drawn incorporating the defect and placing the expected incisions within the relaxed skin tension lines where possible.    The area thus outlined was incised deep to adipose tissue with a #15 scalpel blade.  The skin margins were undermined to an appropriate distance in all directions utilizing iris scissors.
Helical Rim Advancement Flap Text: The defect edges were debeveled with a #15 blade scalpel.  Given the location of the defect and the proximity to free margins (helical rim) a double helical rim advancement flap was deemed most appropriate.  Using a sterile surgical marker, the appropriate advancement flaps were drawn incorporating the defect and placing the expected incisions between the helical rim and antihelix where possible.  The area thus outlined was incised through and through with a #15 scalpel blade.  With a skin hook and iris scissors, the flaps were gently and sharply undermined and freed up.
Saucerization Excision Additional Text (Leave Blank If You Do Not Want): The margin was drawn around the clinically apparent lesion.  Incisions were then made along these lines, in a tangential fashion, to the appropriate tissue plane and the lesion was extirpated.
Suturegard Body: The suture ends were repeatedly re-tightened and re-clamped to achieve the desired tissue expansion.
Home Suture Removal Text: Patient was provided a home suture removal kit and will remove their sutures at home.  If they have any questions or difficulties they will call the office.
Complex Repair And Transposition Flap Text: The defect edges were debeveled with a #15 scalpel blade.  The primary defect was closed partially with a complex linear closure.  Given the location of the remaining defect, shape of the defect and the proximity to free margins a transposition flap was deemed most appropriate for complete closure of the defect.  Using a sterile surgical marker, an appropriate advancement flap was drawn incorporating the defect and placing the expected incisions within the relaxed skin tension lines where possible.    The area thus outlined was incised deep to adipose tissue with a #15 scalpel blade.  The skin margins were undermined to an appropriate distance in all directions utilizing iris scissors.
Complex Repair And A-T Advancement Flap Text: The defect edges were debeveled with a #15 scalpel blade.  The primary defect was closed partially with a complex linear closure.  Given the location of the remaining defect, shape of the defect and the proximity to free margins an A-T advancement flap was deemed most appropriate for complete closure of the defect.  Using a sterile surgical marker, an appropriate advancement flap was drawn incorporating the defect and placing the expected incisions within the relaxed skin tension lines where possible.    The area thus outlined was incised deep to adipose tissue with a #15 scalpel blade.  The skin margins were undermined to an appropriate distance in all directions utilizing iris scissors.
Complex Repair And Epidermal Autograft Text: The defect edges were debeveled with a #15 scalpel blade.  The primary defect was closed partially with a complex linear closure.  Given the location of the defect, shape of the defect and the proximity to free margins an epidermal autograft was deemed most appropriate to repair the remaining defect.  The graft was trimmed to fit the size of the remaining defect.  The graft was then placed in the primary defect, oriented appropriately, and sutured into place.
Complex Repair And Double M Plasty Text: The defect edges were debeveled with a #15 scalpel blade.  The primary defect was closed partially with a complex linear closure.  Given the location of the remaining defect, shape of the defect and the proximity to free margins a double M plasty was deemed most appropriate for complete closure of the defect.  Using a sterile surgical marker, an appropriate advancement flap was drawn incorporating the defect and placing the expected incisions within the relaxed skin tension lines where possible.    The area thus outlined was incised deep to adipose tissue with a #15 scalpel blade.  The skin margins were undermined to an appropriate distance in all directions utilizing iris scissors.
Banner Transposition Flap Text: The defect edges were debeveled with a #15 scalpel blade.  Given the location of the defect and the proximity to free margins a Banner transposition flap was deemed most appropriate.  Using a sterile surgical marker, an appropriate flap drawn around the defect. The area thus outlined was incised deep to adipose tissue with a #15 scalpel blade.  The skin margins were undermined to an appropriate distance in all directions utilizing iris scissors.
Tissue Cultured Epidermal Autograft Text: The defect edges were debeveled with a #15 scalpel blade.  Given the location of the defect, shape of the defect and the proximity to free margins a tissue cultured epidermal autograft was deemed most appropriate.  The graft was then trimmed to fit the size of the defect.  The graft was then placed in the primary defect and oriented appropriately.
Split-Thickness Skin Graft Text: The defect edges were debeveled with a #15 scalpel blade.  Given the location of the defect, shape of the defect and the proximity to free margins a split thickness skin graft was deemed most appropriate.  Using a sterile surgical marker, the primary defect shape was transferred to the donor site. The split thickness graft was then harvested.  The skin graft was then placed in the primary defect and oriented appropriately.
Interpolation Flap Text: A decision was made to reconstruct the defect utilizing an interpolation axial flap and a staged reconstruction.  A telfa template was made of the defect.  This telfa template was then used to outline the interpolation flap.  The donor area for the pedicle flap was then injected with anesthesia.  The flap was excised through the skin and subcutaneous tissue down to the layer of the underlying musculature.  The interpolation flap was carefully excised within this deep plane to maintain its blood supply.  The edges of the donor site were undermined.   The donor site was closed in a primary fashion.  The pedicle was then rotated into position and sutured.  Once the tube was sutured into place, adequate blood supply was confirmed with blanching and refill.  The pedicle was then wrapped with xeroform gauze and dressed appropriately with a telfa and gauze bandage to ensure continued blood supply and protect the attached pedicle.
Alar Island Pedicle Flap Text: The defect edges were debeveled with a #15 scalpel blade.  Given the location of the defect, shape of the defect and the proximity to the alar rim an island pedicle advancement flap was deemed most appropriate.  Using a sterile surgical marker, an appropriate advancement flap was drawn incorporating the defect, outlining the appropriate donor tissue and placing the expected incisions within the nasal ala running parallel to the alar rim. The area thus outlined was incised with a #15 scalpel blade.  The skin margins were undermined minimally to an appropriate distance in all directions around the primary defect and laterally outward around the island pedicle utilizing iris scissors.  There was minimal undermining beneath the pedicle flap.
V-Y Plasty Text: The defect edges were debeveled with a #15 scalpel blade.  Given the location of the defect, shape of the defect and the proximity to free margins an V-Y advancement flap was deemed most appropriate.  Using a sterile surgical marker, an appropriate advancement flap was drawn incorporating the defect and placing the expected incisions within the relaxed skin tension lines where possible.    The area thus outlined was incised deep to adipose tissue with a #15 scalpel blade.  The skin margins were undermined to an appropriate distance in all directions utilizing iris scissors.
Perilesional Excision Additional Text (Leave Blank If You Do Not Want): The margin was drawn around the clinically apparent lesion. Incisions were then made along these lines to the appropriate tissue plane and the lesion was extirpated.
Path Notes (To The Dermatopathologist): Please check margins\\nPCM
Rhomboid Transposition Flap Text: The defect edges were debeveled with a #15 scalpel blade.  Given the location of the defect and the proximity to free margins a rhomboid transposition flap was deemed most appropriate.  Using a sterile surgical marker, an appropriate rhomboid flap was drawn incorporating the defect.    The area thus outlined was incised deep to adipose tissue with a #15 scalpel blade.  The skin margins were undermined to an appropriate distance in all directions utilizing iris scissors.
Rotation Flap Text: The defect edges were debeveled with a #15 scalpel blade.  Given the location of the defect, shape of the defect and the proximity to free margins a rotation flap was deemed most appropriate.  Using a sterile surgical marker, an appropriate rotation flap was drawn incorporating the defect and placing the expected incisions within the relaxed skin tension lines where possible.    The area thus outlined was incised deep to adipose tissue with a #15 scalpel blade.  The skin margins were undermined to an appropriate distance in all directions utilizing iris scissors.
V-Y Flap Text: The defect edges were debeveled with a #15 scalpel blade.  Given the location of the defect, shape of the defect and the proximity to free margins a V-Y flap was deemed most appropriate.  Using a sterile surgical marker, an appropriate advancement flap was drawn incorporating the defect and placing the expected incisions within the relaxed skin tension lines where possible.    The area thus outlined was incised deep to adipose tissue with a #15 scalpel blade.  The skin margins were undermined to an appropriate distance in all directions utilizing iris scissors.
Chonodrocutaneous Helical Advancement Flap Text: The defect edges were debeveled with a #15 scalpel blade.  Given the location of the defect and the proximity to free margins a chondrocutaneous helical advancement flap was deemed most appropriate.  Using a sterile surgical marker, the appropriate advancement flap was drawn incorporating the defect and placing the expected incisions within the relaxed skin tension lines where possible.    The area thus outlined was incised deep to adipose tissue with a #15 scalpel blade.  The skin margins were undermined to an appropriate distance in all directions utilizing iris scissors.
Complex Repair And Bilobe Flap Text: The defect edges were debeveled with a #15 scalpel blade.  The primary defect was closed partially with a complex linear closure.  Given the location of the remaining defect, shape of the defect and the proximity to free margins a bilobe flap was deemed most appropriate for complete closure of the defect.  Using a sterile surgical marker, an appropriate advancement flap was drawn incorporating the defect and placing the expected incisions within the relaxed skin tension lines where possible.    The area thus outlined was incised deep to adipose tissue with a #15 scalpel blade.  The skin margins were undermined to an appropriate distance in all directions utilizing iris scissors.
Suture Removal: 14 days

## 2020-08-13 ENCOUNTER — APPOINTMENT (RX ONLY)
Dept: URBAN - METROPOLITAN AREA CLINIC 24 | Facility: CLINIC | Age: 70
Setting detail: DERMATOLOGY
End: 2020-08-13

## 2020-08-13 DIAGNOSIS — Z48.01 ENCOUNTER FOR CHANGE OR REMOVAL OF SURGICAL WOUND DRESSING: ICD-10-CM

## 2020-08-13 PROBLEM — J30.1 ALLERGIC RHINITIS DUE TO POLLEN: Status: ACTIVE | Noted: 2020-08-13

## 2020-08-13 PROBLEM — L55.1 SUNBURN OF SECOND DEGREE: Status: ACTIVE | Noted: 2020-08-13

## 2020-08-13 PROCEDURE — ? SUTURE REMOVAL (GLOBAL PERIOD)

## 2020-08-13 PROCEDURE — ? OTHER

## 2020-08-13 PROCEDURE — 99024 POSTOP FOLLOW-UP VISIT: CPT

## 2020-08-13 PROCEDURE — ? COUNSELING

## 2020-08-13 ASSESSMENT — LOCATION DETAILED DESCRIPTION DERM: LOCATION DETAILED: LEFT PROXIMAL DORSAL FOREARM

## 2020-08-13 ASSESSMENT — LOCATION ZONE DERM: LOCATION ZONE: ARM

## 2020-08-13 ASSESSMENT — LOCATION SIMPLE DESCRIPTION DERM: LOCATION SIMPLE: LEFT FOREARM

## 2020-08-13 NOTE — PROCEDURE: SUTURE REMOVAL (GLOBAL PERIOD)
Detail Level: Detailed
Add 50850 Cpt? (Important Note: In 2017 The Use Of 80222 Is Being Tracked By Cms To Determine Future Global Period Reimbursement For Global Periods): no

## 2020-08-22 NOTE — HPI: EVALUATION OF SKIN LESION(S)
Hpi Title: Evaluation of Skin Lesions
How Severe Are Your Spot(S)?: mild
Have Your Spot(S) Been Treated In The Past?: has not been treated
No

## 2021-03-01 ENCOUNTER — APPOINTMENT (RX ONLY)
Dept: URBAN - METROPOLITAN AREA CLINIC 24 | Facility: CLINIC | Age: 71
Setting detail: DERMATOLOGY
End: 2021-03-01

## 2021-03-01 DIAGNOSIS — Z87.2 PERSONAL HISTORY OF DISEASES OF THE SKIN AND SUBCUTANEOUS TISSUE: ICD-10-CM

## 2021-03-01 DIAGNOSIS — L82.1 OTHER SEBORRHEIC KERATOSIS: ICD-10-CM

## 2021-03-01 DIAGNOSIS — L57.8 OTHER SKIN CHANGES DUE TO CHRONIC EXPOSURE TO NONIONIZING RADIATION: ICD-10-CM | Status: INADEQUATELY CONTROLLED

## 2021-03-01 DIAGNOSIS — D22 MELANOCYTIC NEVI: ICD-10-CM

## 2021-03-01 DIAGNOSIS — D18.0 HEMANGIOMA: ICD-10-CM

## 2021-03-01 DIAGNOSIS — Z85.828 PERSONAL HISTORY OF OTHER MALIGNANT NEOPLASM OF SKIN: ICD-10-CM

## 2021-03-01 DIAGNOSIS — L57.0 ACTINIC KERATOSIS: ICD-10-CM

## 2021-03-01 DIAGNOSIS — L81.4 OTHER MELANIN HYPERPIGMENTATION: ICD-10-CM

## 2021-03-01 PROBLEM — L85.3 XEROSIS CUTIS: Status: ACTIVE | Noted: 2021-03-01

## 2021-03-01 PROBLEM — D22.5 MELANOCYTIC NEVI OF TRUNK: Status: ACTIVE | Noted: 2021-03-01

## 2021-03-01 PROBLEM — D18.01 HEMANGIOMA OF SKIN AND SUBCUTANEOUS TISSUE: Status: ACTIVE | Noted: 2021-03-01

## 2021-03-01 PROBLEM — J30.1 ALLERGIC RHINITIS DUE TO POLLEN: Status: ACTIVE | Noted: 2021-03-01

## 2021-03-01 PROCEDURE — 99214 OFFICE O/P EST MOD 30 MIN: CPT | Mod: 25

## 2021-03-01 PROCEDURE — 17004 DESTROY PREMAL LESIONS 15/>: CPT

## 2021-03-01 PROCEDURE — ? COUNSELING

## 2021-03-01 PROCEDURE — ? LIQUID NITROGEN

## 2021-03-01 PROCEDURE — ? PRESCRIPTION

## 2021-03-01 RX ORDER — FLUOROURACIL 5 MG/G
CREAM TOPICAL
Qty: 1 | Refills: 0 | Status: ERX | COMMUNITY
Start: 2021-03-01

## 2021-03-01 RX ADMIN — FLUOROURACIL: 5 CREAM TOPICAL at 00:00

## 2021-03-01 ASSESSMENT — LOCATION DETAILED DESCRIPTION DERM
LOCATION DETAILED: RIGHT INFERIOR MEDIAL MALAR CHEEK
LOCATION DETAILED: RIGHT SUPERIOR OCCIPITAL SCALP
LOCATION DETAILED: LEFT INFERIOR FOREHEAD
LOCATION DETAILED: LEFT INFERIOR MEDIAL FOREHEAD
LOCATION DETAILED: NASAL DORSUM
LOCATION DETAILED: RIGHT INFERIOR UPPER BACK
LOCATION DETAILED: RIGHT CENTRAL MALAR CHEEK
LOCATION DETAILED: LEFT DISTAL DORSAL FOREARM
LOCATION DETAILED: LEFT LATERAL MALAR CHEEK
LOCATION DETAILED: RIGHT MEDIAL ZYGOMA
LOCATION DETAILED: LEFT CLAVICULAR NECK
LOCATION DETAILED: LEFT SUPERIOR UPPER BACK
LOCATION DETAILED: LEFT SUPERIOR FOREHEAD
LOCATION DETAILED: RIGHT MEDIAL INFERIOR CHEST
LOCATION DETAILED: EPIGASTRIC SKIN
LOCATION DETAILED: RIGHT MID-UPPER BACK
LOCATION DETAILED: LEFT SUPERIOR MEDIAL MALAR CHEEK
LOCATION DETAILED: LEFT INFERIOR UPPER BACK
LOCATION DETAILED: RIGHT SUPERIOR LATERAL NECK
LOCATION DETAILED: LEFT DISTAL RADIAL DORSAL FOREARM
LOCATION DETAILED: PERIUMBILICAL SKIN
LOCATION DETAILED: LEFT MEDIAL ZYGOMA
LOCATION DETAILED: RIGHT POSTERIOR SHOULDER
LOCATION DETAILED: LEFT CENTRAL MALAR CHEEK
LOCATION DETAILED: RIGHT SUPERIOR CENTRAL MALAR CHEEK
LOCATION DETAILED: LEFT SUPERIOR OCCIPITAL SCALP
LOCATION DETAILED: RIGHT DISTAL DORSAL FOREARM

## 2021-03-01 ASSESSMENT — LOCATION SIMPLE DESCRIPTION DERM
LOCATION SIMPLE: NECK
LOCATION SIMPLE: LEFT OCCIPITAL SCALP
LOCATION SIMPLE: ABDOMEN
LOCATION SIMPLE: RIGHT OCCIPITAL SCALP
LOCATION SIMPLE: RIGHT SHOULDER
LOCATION SIMPLE: CHEST
LOCATION SIMPLE: LEFT FOREARM
LOCATION SIMPLE: LEFT CHEEK
LOCATION SIMPLE: LEFT ANTERIOR NECK
LOCATION SIMPLE: RIGHT CHEEK
LOCATION SIMPLE: RIGHT FOREARM
LOCATION SIMPLE: LEFT FOREHEAD
LOCATION SIMPLE: LEFT UPPER BACK
LOCATION SIMPLE: RIGHT UPPER BACK
LOCATION SIMPLE: LEFT ZYGOMA
LOCATION SIMPLE: NOSE
LOCATION SIMPLE: RIGHT ZYGOMA

## 2021-03-01 ASSESSMENT — LOCATION ZONE DERM
LOCATION ZONE: TRUNK
LOCATION ZONE: FACE
LOCATION ZONE: ARM
LOCATION ZONE: NOSE
LOCATION ZONE: NECK
LOCATION ZONE: SCALP

## 2021-03-01 NOTE — HPI: EVALUATION OF SKIN LESION(S)
Department of Anesthesiology  Postprocedure Note    Patient: Shabbir Yung  MRN: [id number]  YOB: 1983  Date of evaluation: 7/10/2020  Time:  3:56 PM     Procedure Summary     Date:  07/10/20 Room / Location:  [address]    Anesthesia Start:  1420 Anesthesia Stop:  8311    Procedure:  LEFT TIBIAL BONE MARROW HARVEST, LEFT TARSAL TUNNEL DECOMPRESSION, INSTEP PLANTAR FASCIOTOMY, POSTERIOR COMPARTMENT FASCIOTOMY, MORTONS NEUROMA (Left ) Diagnosis:  (LEFT TARSAL TUNNEL SYNDROME, PLANTAR FASCITITIS, MORTONS NEUROMA)    Surgeon:  Mendoza Conley DPM Responsible Provider:  Karma Stark MD    Anesthesia Type:  general ASA Status:  3          Anesthesia Type: general    Fred Phase I: Fred Score: 9    Fred Phase II:      Last vitals: Reviewed and per EMR flowsheets.        Anesthesia Post Evaluation    Patient location during evaluation: PACU  Patient participation: complete - patient participated  Level of consciousness: awake and alert  Airway patency: patent  Nausea & Vomiting: no nausea  Complications: no  Cardiovascular status: blood pressure returned to baseline and hemodynamically stable  Respiratory status: acceptable and spontaneous ventilation  Hydration status: euvolemic
Hpi Title: Evaluation of a Skin Lesion
How Severe Are Your Spot(S)?: mild
Have Your Spot(S) Been Treated In The Past?: has not been treated

## 2021-04-01 NOTE — PROCEDURE: REASSURANCE
Per Dr. Gandara, Detail Level: Zone

## 2021-07-26 ENCOUNTER — APPOINTMENT (RX ONLY)
Dept: URBAN - METROPOLITAN AREA CLINIC 24 | Facility: CLINIC | Age: 71
Setting detail: DERMATOLOGY
End: 2021-07-26

## 2021-07-26 DIAGNOSIS — L81.4 OTHER MELANIN HYPERPIGMENTATION: ICD-10-CM

## 2021-07-26 DIAGNOSIS — D18.0 HEMANGIOMA: ICD-10-CM

## 2021-07-26 DIAGNOSIS — D22 MELANOCYTIC NEVI: ICD-10-CM

## 2021-07-26 DIAGNOSIS — Z87.2 PERSONAL HISTORY OF DISEASES OF THE SKIN AND SUBCUTANEOUS TISSUE: ICD-10-CM

## 2021-07-26 DIAGNOSIS — L82.1 OTHER SEBORRHEIC KERATOSIS: ICD-10-CM

## 2021-07-26 DIAGNOSIS — L57.0 ACTINIC KERATOSIS: ICD-10-CM

## 2021-07-26 DIAGNOSIS — L57.8 OTHER SKIN CHANGES DUE TO CHRONIC EXPOSURE TO NONIONIZING RADIATION: ICD-10-CM | Status: INADEQUATELY CONTROLLED

## 2021-07-26 DIAGNOSIS — Z85.828 PERSONAL HISTORY OF OTHER MALIGNANT NEOPLASM OF SKIN: ICD-10-CM

## 2021-07-26 PROBLEM — L29.8 OTHER PRURITUS: Status: ACTIVE | Noted: 2021-07-26

## 2021-07-26 PROBLEM — D22.5 MELANOCYTIC NEVI OF TRUNK: Status: ACTIVE | Noted: 2021-07-26

## 2021-07-26 PROBLEM — D18.01 HEMANGIOMA OF SKIN AND SUBCUTANEOUS TISSUE: Status: ACTIVE | Noted: 2021-07-26

## 2021-07-26 PROCEDURE — 17000 DESTRUCT PREMALG LESION: CPT

## 2021-07-26 PROCEDURE — ? COUNSELING

## 2021-07-26 PROCEDURE — 17003 DESTRUCT PREMALG LES 2-14: CPT

## 2021-07-26 PROCEDURE — ? LIQUID NITROGEN

## 2021-07-26 PROCEDURE — ? PRESCRIPTION MEDICATION MANAGEMENT

## 2021-07-26 PROCEDURE — 99214 OFFICE O/P EST MOD 30 MIN: CPT | Mod: 25

## 2021-07-26 ASSESSMENT — LOCATION DETAILED DESCRIPTION DERM
LOCATION DETAILED: RIGHT SUPERIOR LATERAL NECK
LOCATION DETAILED: RIGHT INFERIOR FOREHEAD
LOCATION DETAILED: RIGHT INFERIOR UPPER BACK
LOCATION DETAILED: RIGHT SUPERIOR OCCIPITAL SCALP
LOCATION DETAILED: LEFT SUPERIOR UPPER BACK
LOCATION DETAILED: RIGHT MID-UPPER BACK
LOCATION DETAILED: LEFT DISTAL RADIAL DORSAL FOREARM
LOCATION DETAILED: NASAL DORSUM
LOCATION DETAILED: LEFT DISTAL ULNAR DORSAL FOREARM
LOCATION DETAILED: LEFT INFERIOR LATERAL FOREHEAD
LOCATION DETAILED: RIGHT CENTRAL TEMPLE
LOCATION DETAILED: PERIUMBILICAL SKIN
LOCATION DETAILED: RIGHT DISTAL DORSAL FOREARM
LOCATION DETAILED: LEFT SUPERIOR MEDIAL MALAR CHEEK
LOCATION DETAILED: LEFT CENTRAL PARIETAL SCALP
LOCATION DETAILED: LEFT INFERIOR UPPER BACK
LOCATION DETAILED: LEFT SUPERIOR FOREHEAD
LOCATION DETAILED: RIGHT MEDIAL INFERIOR CHEST
LOCATION DETAILED: LEFT LATERAL MALAR CHEEK
LOCATION DETAILED: EPIGASTRIC SKIN
LOCATION DETAILED: RIGHT SUPERIOR MEDIAL FOREHEAD
LOCATION DETAILED: RIGHT SUPERIOR LATERAL MALAR CHEEK
LOCATION DETAILED: LEFT FOREHEAD

## 2021-07-26 ASSESSMENT — LOCATION SIMPLE DESCRIPTION DERM
LOCATION SIMPLE: RIGHT CHEEK
LOCATION SIMPLE: RIGHT FOREHEAD
LOCATION SIMPLE: NECK
LOCATION SIMPLE: ABDOMEN
LOCATION SIMPLE: CHEST
LOCATION SIMPLE: RIGHT FOREARM
LOCATION SIMPLE: LEFT FOREARM
LOCATION SIMPLE: SCALP
LOCATION SIMPLE: LEFT FOREHEAD
LOCATION SIMPLE: RIGHT OCCIPITAL SCALP
LOCATION SIMPLE: RIGHT TEMPLE
LOCATION SIMPLE: LEFT UPPER BACK
LOCATION SIMPLE: NOSE
LOCATION SIMPLE: RIGHT UPPER BACK
LOCATION SIMPLE: LEFT CHEEK

## 2021-07-26 ASSESSMENT — LOCATION ZONE DERM
LOCATION ZONE: FACE
LOCATION ZONE: NECK
LOCATION ZONE: NOSE
LOCATION ZONE: SCALP
LOCATION ZONE: ARM
LOCATION ZONE: TRUNK

## 2021-07-26 NOTE — PROCEDURE: PRESCRIPTION MEDICATION MANAGEMENT
Detail Level: Zone
Render In Strict Bullet Format?: No
Continue Regimen: Fluorouracil 5% on areas of sun damage

## 2021-07-26 NOTE — PROCEDURE: LIQUID NITROGEN
Post-Care Instructions: I reviewed with the patient in detail post-care instructions. Patient is to wear sunprotection, and avoid picking at any of the treated lesions. Pt may apply Vaseline to crusted or scabbing areas.
Show Aperture Variable?: Yes
Consent: The patient's consent was obtained including but not limited to risks of crusting, scabbing, blistering, scarring, darker or lighter pigmentary change, recurrence, incomplete removal and infection.
Render Post-Care Instructions In Note?: no
Detail Level: Detailed
Duration Of Freeze Thaw-Cycle (Seconds): 0

## 2022-01-24 ENCOUNTER — APPOINTMENT (RX ONLY)
Dept: URBAN - METROPOLITAN AREA CLINIC 24 | Facility: CLINIC | Age: 72
Setting detail: DERMATOLOGY
End: 2022-01-24

## 2022-01-24 DIAGNOSIS — L57.0 ACTINIC KERATOSIS: ICD-10-CM

## 2022-01-24 DIAGNOSIS — L57.8 OTHER SKIN CHANGES DUE TO CHRONIC EXPOSURE TO NONIONIZING RADIATION: ICD-10-CM

## 2022-01-24 DIAGNOSIS — D22 MELANOCYTIC NEVI: ICD-10-CM

## 2022-01-24 DIAGNOSIS — Z85.828 PERSONAL HISTORY OF OTHER MALIGNANT NEOPLASM OF SKIN: ICD-10-CM

## 2022-01-24 DIAGNOSIS — Z87.2 PERSONAL HISTORY OF DISEASES OF THE SKIN AND SUBCUTANEOUS TISSUE: ICD-10-CM

## 2022-01-24 DIAGNOSIS — L82.1 OTHER SEBORRHEIC KERATOSIS: ICD-10-CM

## 2022-01-24 DIAGNOSIS — D18.0 HEMANGIOMA: ICD-10-CM

## 2022-01-24 PROBLEM — H91.90 UNSPECIFIED HEARING LOSS, UNSPECIFIED EAR: Status: ACTIVE | Noted: 2022-01-24

## 2022-01-24 PROBLEM — L85.3 XEROSIS CUTIS: Status: ACTIVE | Noted: 2022-01-24

## 2022-01-24 PROBLEM — L55.1 SUNBURN OF SECOND DEGREE: Status: ACTIVE | Noted: 2022-01-24

## 2022-01-24 PROBLEM — D22.5 MELANOCYTIC NEVI OF TRUNK: Status: ACTIVE | Noted: 2022-01-24

## 2022-01-24 PROBLEM — D18.01 HEMANGIOMA OF SKIN AND SUBCUTANEOUS TISSUE: Status: ACTIVE | Noted: 2022-01-24

## 2022-01-24 PROCEDURE — 17000 DESTRUCT PREMALG LESION: CPT

## 2022-01-24 PROCEDURE — ? LIQUID NITROGEN

## 2022-01-24 PROCEDURE — 99213 OFFICE O/P EST LOW 20 MIN: CPT | Mod: 25

## 2022-01-24 PROCEDURE — ? COUNSELING

## 2022-01-24 PROCEDURE — 17003 DESTRUCT PREMALG LES 2-14: CPT

## 2022-01-24 ASSESSMENT — LOCATION DETAILED DESCRIPTION DERM
LOCATION DETAILED: RIGHT SUPERIOR OCCIPITAL SCALP
LOCATION DETAILED: LEFT INFERIOR UPPER BACK
LOCATION DETAILED: LEFT SUPERIOR UPPER BACK
LOCATION DETAILED: RIGHT CENTRAL TEMPLE
LOCATION DETAILED: RIGHT SUPERIOR MEDIAL FOREHEAD
LOCATION DETAILED: SUPERIOR THORACIC SPINE
LOCATION DETAILED: LEFT INFERIOR LATERAL FOREHEAD
LOCATION DETAILED: LEFT SUPERIOR MEDIAL MALAR CHEEK
LOCATION DETAILED: LEFT CENTRAL PARIETAL SCALP
LOCATION DETAILED: LEFT LATERAL MALAR CHEEK
LOCATION DETAILED: NASAL DORSUM
LOCATION DETAILED: LEFT FOREHEAD
LOCATION DETAILED: EPIGASTRIC SKIN
LOCATION DETAILED: RIGHT SUPERIOR LATERAL MALAR CHEEK
LOCATION DETAILED: POSTERIOR MID-PARIETAL SCALP
LOCATION DETAILED: RIGHT INFERIOR UPPER BACK
LOCATION DETAILED: PERIUMBILICAL SKIN
LOCATION DETAILED: RIGHT INFERIOR FOREHEAD
LOCATION DETAILED: LEFT DISTAL RADIAL DORSAL FOREARM
LOCATION DETAILED: LEFT SUPERIOR FOREHEAD
LOCATION DETAILED: RIGHT MEDIAL INFERIOR CHEST
LOCATION DETAILED: RIGHT SUPERIOR LATERAL NECK
LOCATION DETAILED: RIGHT MID-UPPER BACK

## 2022-01-24 ASSESSMENT — LOCATION ZONE DERM
LOCATION ZONE: SCALP
LOCATION ZONE: FACE
LOCATION ZONE: NOSE
LOCATION ZONE: NECK
LOCATION ZONE: TRUNK
LOCATION ZONE: ARM

## 2022-01-24 ASSESSMENT — LOCATION SIMPLE DESCRIPTION DERM
LOCATION SIMPLE: NECK
LOCATION SIMPLE: RIGHT CHEEK
LOCATION SIMPLE: CHEST
LOCATION SIMPLE: UPPER BACK
LOCATION SIMPLE: LEFT CHEEK
LOCATION SIMPLE: RIGHT TEMPLE
LOCATION SIMPLE: POSTERIOR SCALP
LOCATION SIMPLE: RIGHT FOREHEAD
LOCATION SIMPLE: RIGHT OCCIPITAL SCALP
LOCATION SIMPLE: LEFT FOREHEAD
LOCATION SIMPLE: ABDOMEN
LOCATION SIMPLE: NOSE
LOCATION SIMPLE: LEFT UPPER BACK
LOCATION SIMPLE: SCALP
LOCATION SIMPLE: RIGHT UPPER BACK
LOCATION SIMPLE: LEFT FOREARM

## 2022-03-18 PROBLEM — M17.12 OSTEOARTHRITIS OF LEFT KNEE: Status: ACTIVE | Noted: 2018-12-05

## 2022-03-19 PROBLEM — Z96.652 STATUS POST LEFT KNEE REPLACEMENT: Status: ACTIVE | Noted: 2018-12-05

## 2022-03-20 PROBLEM — E87.1 HYPONATREMIA: Status: ACTIVE | Noted: 2018-11-13

## 2022-03-20 PROBLEM — M19.90 OSTEOARTHRITIS: Status: ACTIVE | Noted: 2018-12-05

## 2022-07-15 ENCOUNTER — TELEPHONE (OUTPATIENT)
Dept: ORTHOPEDIC SURGERY | Age: 72
End: 2022-07-15

## 2022-07-19 ENCOUNTER — TELEPHONE (OUTPATIENT)
Dept: ORTHOPEDIC SURGERY | Age: 72
End: 2022-07-19

## 2022-07-19 NOTE — TELEPHONE ENCOUNTER
Spoke with patient and informed him that we need to see him back for eval before his next inj.  He is scheduled for f/u with [facility] 7/22/22

## 2022-07-22 ENCOUNTER — OFFICE VISIT (OUTPATIENT)
Dept: ORTHOPEDIC SURGERY | Age: 72
End: 2022-07-22
Payer: MEDICARE

## 2022-07-22 DIAGNOSIS — M48.062 LUMBAR STENOSIS WITH NEUROGENIC CLAUDICATION: Primary | ICD-10-CM

## 2022-07-22 DIAGNOSIS — M51.36 DDD (DEGENERATIVE DISC DISEASE), LUMBAR: ICD-10-CM

## 2022-07-22 PROCEDURE — 1036F TOBACCO NON-USER: CPT | Performed by: PHYSICIAN ASSISTANT

## 2022-07-22 PROCEDURE — 3017F COLORECTAL CA SCREEN DOC REV: CPT | Performed by: PHYSICIAN ASSISTANT

## 2022-07-22 PROCEDURE — G8417 CALC BMI ABV UP PARAM F/U: HCPCS | Performed by: PHYSICIAN ASSISTANT

## 2022-07-22 PROCEDURE — G8427 DOCREV CUR MEDS BY ELIG CLIN: HCPCS | Performed by: PHYSICIAN ASSISTANT

## 2022-07-22 PROCEDURE — 99214 OFFICE O/P EST MOD 30 MIN: CPT | Performed by: PHYSICIAN ASSISTANT

## 2022-07-22 PROCEDURE — 1123F ACP DISCUSS/DSCN MKR DOCD: CPT | Performed by: PHYSICIAN ASSISTANT

## 2022-07-22 NOTE — LETTER
Oscar Mooring                                                     ARPAN CABALLERO  1950  MRN [id number]                                              ROOM NUMBER______      Radiographic Studies:    Cervical MRI      Thoracic MRI         Lumbar MRI          Pelvis MRI        CONTRAST    CT Myelogram: _______________   NCS/EMG ________________ ( UE  /  LE )     MRI of ___________________          Other: ____________________      Injections:    KNEE    HIP  Depomedrol _____ mg Euflexxa _____    _______________ TFESI/SNRB  _______________ SI Joint  _______________ FAROOQ    _______________ Facet  _______________Piriformis/ Sciatica      Medications:    Oral Steroids _______________  NSAIDS _______________    Muscle Relaxers _______________  Neurontin/Lyrica _______________    Pain Medicine _______________  Other _______________                       Physical Therapy:    Lumbar     Thoracic      Cervical     Hip       Knee       Shoulder               Traction          Ultrasound          Dry Needling      Referral:    Pain referral:  [facility]   [facility]   Other: ______________________________    Follow-up/ Refer__________________________________________________    Authorization to hold blood thinners:___________________________________

## 2022-07-22 NOTE — PATIENT INSTRUCTIONS
Epidural Steroid Injection / Selective Nerve Root Block  What is it? An epidural steroid injection (FAROOQ) is an injection of an anti-inflammatory medication directly on the sac that covers the spinal cord and nerves. A Selective Nerve Root Block (SNRB) is an injection that is directed around a specific nerve. Your physician usually orders an injection  when you have symptoms of an irritated spinal nerve. These symptoms can include back, buttock or leg pain, weakness, or numbness. Irritated spinal nerves are often caused by disc herniations or a tight spinal canal (stenosis). The goal of the steroid injection is to reduce the inflammation around the spinal cord and nerves, which should reduce the amount of back and leg pain you are experiencing. Epidural injections are often done in series. It would not be unusual to have two or three injections in a row 10 to 14 days apart. The reason for multiple injections is that the relief from the injection may wear off over time. And sometimes it takes multiple doses of steroid injection to obtain the most anti-inflammatory effect around the nerves. How is it performed? You will be asked to lie on your side or stomach on the x-ray table. This will allow the physician to position you in the best way possible to access your back. Next, the skin will be cleaned and numbed with a local anesthetic. An X-ray machine will then be used to guide a small gauge needle into the space over your spinal sac. A small amount of dye will then be injected to insure the needle is in the proper position. Finally, a mixture of numbing medicine and anti-inflammatory (steroid/cortisone) will be injected. How long does it take? All together it should take about 1 hour. The back and legs may feel weak or numb for a couple of hours after the injection. Plan the have someone drive you home. Are there any restrictions after the FAROOQ?    Try to spend the remainder of the evening resting as much as possible. You may return to your normal activities the day after the procedure, including returning to work. What are the risks? The most common risk is a spinal headache. This happens when the needle passes through the spinal sac and can result in leakage of spinal fluid. This is usually treated with lying in a flat position and high fluid intake. Rarely, spinal headaches lasting more than a few days can be treated with a small procedure called a blood patch. Another risk, though very small, is the injection of the medication into one of the tiny blood vessels in the spinal canal.  This can result in serious complications such as seizures, cardiac arrest and even death. Fortunately, these complications are quite rare. Other rare complications include infection, bleeding into the spinal canal (epidural hematoma), loss of bladder control, and injury to a nerve with the needle. Who should not have an FAROOQ? The injection of a steroid anywhere in the body can cause a significant increase in the blood sugar level. Diabetics are very sensitive to steroids and should have them administered with caution. Diabetic patients can have injections but need to watch their blood sugar after the injection and discuss with their Primary Care Physician a plan to manage elevations in blood sugar. Steroids also decrease the body's ability to fight infection. Thus, any person with an active infection should not take a steroid medication until the infection has been cleared completely. If you are taking an antibiotic for an active infection, please notify our office.    Additionally, some patients may have anatomic abnormalities or have had previous back surgeries which may not allow the needle to pass into the spinal canal.     Medications that result in thinning of the blood such as coumadin, aspirin, Plavix, Eliquis, Xarelto and many anti-inflammatory medications need to be discontinued 5-7 days prior to the injection. Check with your doctor regarding specific drugs you are taking. Med    [facility]    MD Sarai Do MD Amy [place], PA-C Tonnie Falco, NP    Main Office  [address] [facility],[address], [facility] [facility], [address]        For Appointments at either location, please call [phone] that result in thinning of blood such as Coumadin, Aspirin, Plavix, and many anti-inflammatories, need to Pre procedure teaching sheet: Epidural spinal injection, selective nerve root block injection, sacroiliac injection and facet block injections. You have been scheduled for spinal injection. This injection is to help decrease her back and or leg pain. A local anesthetic will be used to numb the area. Then, a spinal needle will be placed in the appropriate place according to the type of injection ordered by your physician. A steroid with or without local anesthetic will be injected. A small amount of contrast dye will be used to better visualize the injection site. You will be lying on your stomach. A series of 3 injections may be performed as these are ordered 2 to 3 weeks apart. Be aware, steroids can take 2 to 3 days to begin working, but can take 7 to 10 days for full effectiveness. Therefore, you may not have relief immediately. Please be patient and give the injection time to work. You should not resume any type of strenuous workout routine for at least 3 days following the procedure. Walking is fine. Prior to your procedure    - Please call your primary care physician if you are on blood thinners such as Coumadin, warfarin, heparin, Plavix, Lovenox, Effient, Eliquis, Xarelto, Brilinta, or aspirin or other blood thinner as these will need to be stopped for 3 to 7 days before the injections.   We will need verbal approval to stop the thinners and proceed with the injection from the physician treating you with the blood thinners prior to the appointment date. If your physician tells you it is not safe to stop the blood thinner, you must call our office and discuss this with us. We may need to cancel the procedure. - Please do not take any nonsteroidal anti-inflammatory medications (NSAIDs) such as Advil, ibuprofen, Celebrex, meloxicam or Aleve for 3 days before your injection.    - We cannot give you an injection if you are presently taking an antibiotic. - Please continue your other medications as prescribed. -You must bring someone to drive you home. - You may eat prior to your procedure, but we asked that you do not eat a heavy meal within 2 to 4 hours of your procedure. You may drink liquids up to 1 to 2 hours before your appointment time. - If you are diabetic, please adjust your medications as appropriate. If steroid is used be aware that they may increase your blood sugar. Closely monitor your blood sugars after the procedure. - If you are sick, running a fever, have a virus or are put on antibiotics during the week before your procedure, please call to reschedule. We cannot do injections if you are sick. Day of procedure    - Arrive 15 minutes before your procedure time, register at the . - A staff member will call you from the waiting area and bring you to the exam room. - You may or may not be asked to change into shorts. Please leave valuables at home. If you wear clothing with elastic waist, you will not be required to change. - The nurse will talk with you and have you sign a consent form before your injection. If you have any allergies to Betadine, iodine, shellfish or x-ray dye, please tell the nurse at this time. - You will be positioned on the table on your stomach. A special x-ray machine is used to tawanda the correct position of the needle. We will clean the area with Betadine or Hibiclens.   Sterile towels were placed around the area to be injected. - The doctor will use a local anesthetic to numb the skin. This burns like a bee sting for about 20 seconds. As the needle is advanced, you will feel pressure. - Once the needle is in the appropriate space, the medication will be injected. Once the needle is removed, your back will be cleaned. You will move back to the exam room. - You are required to stay 20 to 30 minutes following the procedure. Although not expected you may have some numbness from the local anesthetic. If this were to interfere with her walking, you will not be discharged from the office until it is safe for you to leave. - Your discharge instructions and follow-up care will be discussed with you prior to leaving the office.           PRODUCTS THAT MAY THIN THE BLOOD    Medications, over-the-counter medications and herbal supplements    Aches-N-Pain    Disalcid   Meclenofenamate   Plavix  Acetylsalicylic acid (ASA)  Kel's Pills   Meclomen    Ponstel  Actron     Dolobid   Medipren    Relefen  Advil     Dristan    Mefenamic acid   Robaxisal  Aleve     Ecotrin    Meloxicam    Rufen  [place]    Empirin   Menadol    Saleto  Anacin     Equagesic   Methocarbamol   Salsalate  Anaprox    Etodolac   Midol     Sine-aid  Ansaid     Excedrin   Mobic     Sine-off  Arthritis Pain formula   Feldene   Motrin     Sominex  Ascriptin    Fenoprofen   Nabumetone    Stanback  Aspergum    Feverfew   Nalfon     Sulindac  Aspirin     Florinal   Naprosyn    Talwin Compound  Priya     Flurbiprofen   Naproxen    Ticlid  BC Powders    Genpril    Norgesic    iclopidine  Bufferin    Goody's   Nuprin     Tolectin  Cataflam    Haltrin    Nyquil     Tolmetin  Clinoril     IBU    Orudis     Toradol  Clopidogrel    Ibuprofen   Oruvail     Trental  Coricidin    Indocin    Oxaprozin    Triclosan  Coumadin    Indomethacin   Pamprin    Vanquish  Darvon Compound   Ketoprofen   Pepto Bismol    Vitamin - E  Daypro     Ketorolac   Percodan    Voltaren  Diflunisal Kaopectate   Lovenox   Piroxicam    Warfarin    Diclofenac Lodine       Phenaphen    Xarelto  Eliquis       Enoxaparin       Lumbar Stenosis    What is lumbar stenosis? Stenosis is defined as the narrowing of the spinal canal. The term lumbar simply refers to the lowest 5 vertebrae in the spine. Externally this corresponds to the small of the back just above your buttocks. Each lumbar vertebra has 3 tunnels which can be affected by stenosis. The large tunnel in the middle of the vertebra is where the spinal cord and all of the spinal nerves are contained. Also, a much smaller tunnel is on each side of the vertebra. This is where the individual nerves exit the spine. Narrowing of any of these tunnels can result in pressure on the spinal cord or spinal nerves. Spinal stenosis may involve multiple levels of the spine or may be localized to a single level. What causes spinal stenosis? Typically the tunnels in vertebrae are quite spacious and much larger than the spinal cord and nerves that pass through them. However, some patients are genetically programmed to have smaller size tunnels in the spine, predisposing them to develop symptoms from spinal stenosis. There are several other potential causes of spinal stenosis. A single event, such as a disc herniation or a fracture can cause symptoms of stenosis. But more often, it is caused by arthritic changes, such as bone spurs, thickened ligaments, joint laxity, and disc bulges. This results in pinched spinal nerves which gives symptoms of buttock and leg pain and weakness. What are the symptoms of spinal stenosis? Patients will typically have low back pain along with pain in the buttocks and legs. This usually affects patients more when they are standing or walking, and their pain is often relieved with sitting or lying.  Many patients report that the decrease in their ability to walk is the most bothersome part of the condition. And, some have found that leaning forward (such as using a cart while shopping) has enabled them to go further with less pain. Are there other conditions that can cause similar symptoms? The condition which most closely mimics spinal stenosis is poor blood circulation to the legs (vascular claudication). Other conditions such as diabetic neuropathy and hip or knee arthritis also have very similar symptoms to spinal stenosis. What is the prognosis? The natural history of degenerative spinal stenosis is usually a slow progression, gradually reducing the ability to stand or walk for extended periods. What treatments are available? The use of anti-inflammatory medications may give partial relief of symptoms. Physical therapy is often prescribed initially, which may improve lumbar range of motion and strength. Chiropractic care may help ease early symptoms in some patients. A series of steroid injections into the spine may calm the inflammation of the nerves and give temporary relief of the buttock and leg symptoms. Though, these treatments may help the patient cope with the symptoms for several years, they have little effect on the natural history of the disease which is slow progression. When should I consider consulting a spine surgeon? When you are no longer able to tolerate the symptoms or it is interfering with your everyday activities despite the use of conservative treatments, you may be a good candidate for a decompression type of spine surgery. The procedure typically performed is called a laminectomy. Some patients may require a fusion in addition to the laminectomy if there is too much joint laxity from the arthritic changes in the spine. A decompression operation for spinal stenosis is about 80% effective in reducing your buttock and leg symptoms, including your ability to stand and walk.   Though there may be some reduction in low back pain, a laminectomy is much less predictable for the treatment of isolated back pain without symptoms in the buttocks or legs.       [facility]    MD Annette Colón MD Amy Vena [place], PA-C Recardo Finner, NP    Main Office  [address] [facility],[address], [facility] [place] [address] [address] [place] [address]       For Appointments at either location, please call [phone]

## 2022-07-22 NOTE — PROGRESS NOTES
Name: Vinita Glass   YOB: 1950   Gender: male   MRN: [id number]      CC: Follow-up (Wants another injection)          HPI:  Vinita Glass is a 66 y.o.  male who  I saw in 2018 prior to his total knee replacement. He had an MRI scan at that time that did reveal lumbar stenosis at L4-5. His symptoms were neurogenic claudication symptoms. He took Celebrex for couple of years and that did help the pain however  he is now discontinued Celebrex. He does have some cardiovascular disease. He is on aspirin 81 mg. MRI scan of the lumbar spine from 2018. He has facet arthropathy and degenerative changes at L4-5 that creates moderate spinal stenosis. No anterior listhesis was noted. We referred  him for L4-5 epidural steroid injection 10/21/21. He had 100% relief of his symptoms. The injection lasted at least 4 months. He reports he started having pain again about 2 months ago. It is more across the lower back and radiates more into the right hip and sometimes in the right leg. Is much worse at night and now he is began having difficult time sleeping because of the pain. Pain can get up to 6 out of 10. He desires another injection because the last was so effective. He also ask what other treatments can be done and we did talk about the role of laminectomy. If he does not have sustained relief with lumbar injections, we would need a new MRI scan to further delineate anatomy and recommend surgical options.         Allergies   Allergen Reactions    Lisinopril Other (See Comments)    Losartan Other (See Comments)     Patient unsure of reaction       Current Outpatient Medications   Medication Sig Dispense Refill    acetaminophen (TYLENOL) 325 MG tablet Take 325 mg by mouth every 4 hours as needed      amLODIPine (NORVASC) 5 MG tablet Take 5 mg by mouth daily      aspirin 81 MG EC tablet Take by mouth daily      Cetirizine HCl 10 MG CAPS Take by mouth      chlorpheniramine (CHLOR-TRIMETON) 4 MG tablet Take 4 mg by mouth every 6 hours as needed      coenzyme Q10 100 MG CAPS capsule Take 100 mg by mouth daily      nitroGLYCERIN (NITROSTAT) 0.4 MG SL tablet Place 0.4 mg under the tongue      triamcinolone (NASACORT) 55 MCG/ACT nasal inhaler 1 puff in each nostril       No current facility-administered medications for this visit. Past Surgical History:   Procedure Laterality Date    COLONOSCOPY      KNEE ARTHROSCOPY Left 2001    LUMBAR DISCECTOMY  1988    OTHER SURGICAL HISTORY      basal cell cancer removed x2    OTHER SURGICAL HISTORY  2003    Tolentino's Neuroma L foot    [facility] CARDIAC SURG PROCEDURE UNLIST  2012    PCI x 1      Patient Active Problem List    Diagnosis Date Noted    Osteoarthritis of left knee 12/05/2018    Status post left knee replacement 12/05/2018    Osteoarthritis 12/05/2018    Hyponatremia 11/13/2018    Mitral valve regurgitation     Varicose veins of both lower extremities 07/06/2016    Hyperlipidemia     CAD (coronary artery disease) 09/02/2012    Hypertension 08/31/2012      Tobacco Use: Medium Risk    Smoking Tobacco Use: Former    Smokeless Tobacco Use: Never      Alcohol Use: Not on file                Radiographic Studies:       AP lateral lumbar spine  taken 10/8/2021 shows normal alignment on the AP view. Hips show no significant DJD. Sagittal view of the lumbar spine reveals facet arthropathy L4-5 L5-S1 and degenerative disc changes primarily at L5-S1. No anterior listhesis noted. No fractures noted. X-ray impression: Degenerative changes lumbar spine       I also reviewed the MRI scan of the lumbar spine from 2018. He has facet arthropathy and degenerative changes at L4-5 that creates moderate spinal stenosis. No anterior listhesis was noted. Assessment/Plan:            ICD-10-CM    1. Lumbar stenosis with neurogenic claudication  M48.062       2.  DDD (degenerative disc disease), lumbar  M51.36            This patient's clinical history and physical exam is consistent with neurogenic claudication which is likely due to lumbar stenosis and spondylolisthesis. I discussed the natural history of lumbar stenosis in that it is a degenerative condition that is usually slowly progressive resulting in gradual loss of mobility. I reassured the patient that this is not a condition that typically predisposes him to an acute paraplegia; however, the more neurologic deficits he acquires and the longer they go untreated, the less likely he is to have neurological improvements after an operation. He understands that conservative treatments typically include physical therapy, oral and/or epidural steroids, pain management, and simple observation. And, that these treatments do not address the anatomical pinching of the spinal nerves, but rather help patients cope with the resulting symptoms.      - Injection: The patient will be referred for a lumbar steroid injection to help reduce the symptoms. The patient understands the risks including hyperglycemia, immunosuppression, meningitis, cerebrospinal fluid leak, epidural hematoma, and reaction to medication. The patient may benefit from additional injections depending on the response. The injection should be a L4-5 epidural steroid injection. I am recommending referral to Dr. Amilcar Andrea for evaluation and treatment. The patient does not feel he would need surgery as he is done so over the injections and if it anytime injections fail to provide sustained relief, will be happy to see him back for surgical consult. No orders of the defined types were placed in this encounter. No orders of the defined types were placed in this encounter. 4 This is a chronic illness/condition with exacerbation and progression      Return for lumbar injection with [facility], eval/treat appt with Dr. Amilcar Andrea. Maurisio Griffith PA-C  07/22/22      Elements of this note were created using speech recognition software.   As such, errors of speech recognition may be present.

## 2022-07-26 ENCOUNTER — APPOINTMENT (RX ONLY)
Dept: URBAN - METROPOLITAN AREA CLINIC 24 | Facility: CLINIC | Age: 72
Setting detail: DERMATOLOGY
End: 2022-07-26

## 2022-07-26 DIAGNOSIS — D18.0 HEMANGIOMA: ICD-10-CM

## 2022-07-26 DIAGNOSIS — L82.1 OTHER SEBORRHEIC KERATOSIS: ICD-10-CM

## 2022-07-26 DIAGNOSIS — Z87.2 PERSONAL HISTORY OF DISEASES OF THE SKIN AND SUBCUTANEOUS TISSUE: ICD-10-CM

## 2022-07-26 DIAGNOSIS — L57.8 OTHER SKIN CHANGES DUE TO CHRONIC EXPOSURE TO NONIONIZING RADIATION: ICD-10-CM

## 2022-07-26 DIAGNOSIS — Z85.828 PERSONAL HISTORY OF OTHER MALIGNANT NEOPLASM OF SKIN: ICD-10-CM

## 2022-07-26 DIAGNOSIS — D485 NEOPLASM OF UNCERTAIN BEHAVIOR OF SKIN: ICD-10-CM

## 2022-07-26 DIAGNOSIS — L57.0 ACTINIC KERATOSIS: ICD-10-CM

## 2022-07-26 DIAGNOSIS — D22 MELANOCYTIC NEVI: ICD-10-CM

## 2022-07-26 PROBLEM — J30.1 ALLERGIC RHINITIS DUE TO POLLEN: Status: ACTIVE | Noted: 2022-07-26

## 2022-07-26 PROBLEM — D48.5 NEOPLASM OF UNCERTAIN BEHAVIOR OF SKIN: Status: ACTIVE | Noted: 2022-07-26

## 2022-07-26 PROBLEM — D18.01 HEMANGIOMA OF SKIN AND SUBCUTANEOUS TISSUE: Status: ACTIVE | Noted: 2022-07-26

## 2022-07-26 PROBLEM — D22.5 MELANOCYTIC NEVI OF TRUNK: Status: ACTIVE | Noted: 2022-07-26

## 2022-07-26 PROCEDURE — ? LIQUID NITROGEN

## 2022-07-26 PROCEDURE — ? BIOPSY BY SHAVE METHOD

## 2022-07-26 PROCEDURE — 99213 OFFICE O/P EST LOW 20 MIN: CPT | Mod: 25

## 2022-07-26 PROCEDURE — 11102 TANGNTL BX SKIN SINGLE LES: CPT | Mod: 59

## 2022-07-26 PROCEDURE — 17004 DESTROY PREMAL LESIONS 15/>: CPT

## 2022-07-26 PROCEDURE — ? COUNSELING

## 2022-07-26 ASSESSMENT — LOCATION SIMPLE DESCRIPTION DERM
LOCATION SIMPLE: UPPER BACK
LOCATION SIMPLE: RIGHT HAND
LOCATION SIMPLE: RIGHT UPPER BACK
LOCATION SIMPLE: LEFT EAR
LOCATION SIMPLE: NOSE
LOCATION SIMPLE: RIGHT CHEEK
LOCATION SIMPLE: LEFT NOSE
LOCATION SIMPLE: LEFT TEMPLE
LOCATION SIMPLE: ABDOMEN
LOCATION SIMPLE: LEFT UPPER BACK
LOCATION SIMPLE: LEFT CHEEK
LOCATION SIMPLE: LEFT FOREARM
LOCATION SIMPLE: POSTERIOR SCALP
LOCATION SIMPLE: CHEST
LOCATION SIMPLE: RIGHT ZYGOMA
LOCATION SIMPLE: SCALP
LOCATION SIMPLE: RIGHT FOREHEAD
LOCATION SIMPLE: NECK
LOCATION SIMPLE: LEFT OCCIPITAL SCALP
LOCATION SIMPLE: LEFT FOREHEAD

## 2022-07-26 ASSESSMENT — LOCATION DETAILED DESCRIPTION DERM
LOCATION DETAILED: RIGHT CENTRAL ZYGOMA
LOCATION DETAILED: NASAL DORSUM
LOCATION DETAILED: LEFT CENTRAL MALAR CHEEK
LOCATION DETAILED: RIGHT SUPERIOR FOREHEAD
LOCATION DETAILED: LEFT SUPERIOR OCCIPITAL SCALP
LOCATION DETAILED: RIGHT SUPERIOR LATERAL NECK
LOCATION DETAILED: LEFT DISTAL DORSAL FOREARM
LOCATION DETAILED: LEFT SUPERIOR CENTRAL MALAR CHEEK
LOCATION DETAILED: RIGHT RADIAL DORSAL HAND
LOCATION DETAILED: RIGHT SUPERIOR FRONTAL SCALP
LOCATION DETAILED: POSTERIOR MID-PARIETAL SCALP
LOCATION DETAILED: LEFT INFERIOR MEDIAL FOREHEAD
LOCATION DETAILED: LEFT SUPERIOR HELIX
LOCATION DETAILED: RIGHT MID-UPPER BACK
LOCATION DETAILED: RIGHT INFERIOR CENTRAL MALAR CHEEK
LOCATION DETAILED: RIGHT INFERIOR UPPER BACK
LOCATION DETAILED: LEFT DISTAL RADIAL DORSAL FOREARM
LOCATION DETAILED: RIGHT INFERIOR FOREHEAD
LOCATION DETAILED: RIGHT MEDIAL INFERIOR CHEST
LOCATION DETAILED: SUPERIOR THORACIC SPINE
LOCATION DETAILED: EPIGASTRIC SKIN
LOCATION DETAILED: PERIUMBILICAL SKIN
LOCATION DETAILED: LEFT SUPERIOR UPPER BACK
LOCATION DETAILED: LEFT NASAL SIDEWALL
LOCATION DETAILED: LEFT INFERIOR UPPER BACK
LOCATION DETAILED: LEFT SUPERIOR LATERAL MALAR CHEEK
LOCATION DETAILED: LEFT MEDIAL TEMPLE
LOCATION DETAILED: LEFT MID PREAURICULAR CHEEK

## 2022-07-26 ASSESSMENT — LOCATION ZONE DERM
LOCATION ZONE: TRUNK
LOCATION ZONE: EAR
LOCATION ZONE: ARM
LOCATION ZONE: HAND
LOCATION ZONE: FACE
LOCATION ZONE: SCALP
LOCATION ZONE: NECK
LOCATION ZONE: NOSE

## 2022-07-26 NOTE — PROCEDURE: BIOPSY BY SHAVE METHOD
Notification Instructions: Patient will be notified of biopsy results. However, patient instructed to call the office if not contacted within 2 weeks.
Wound Care: Vaseline
Destruction After The Procedure: No
Depth Of Biopsy: dermis
Billing Type: Third-Party Bill
Silver Nitrate Text: The wound bed was treated with silver nitrate after the biopsy was performed.
Accession #: [id number]
Size Of Lesion In Cm: 0
Detail Level: Detailed
Electrodesiccation And Curettage Text: The wound bed was treated with electrodesiccation and curettage after the biopsy was performed.
Cryotherapy Text: The wound bed was treated with cryotherapy after the biopsy was performed.
Biopsy Type: H and E
Validate Note Data (See Information Below): Yes
Anesthesia Volume In Cc: 0.3
Hemostasis: Aluminum Chloride
Information: Selecting Yes will display possible errors in your note based on the variables you have selected. This validation is only offered as a suggestion for you. PLEASE NOTE THAT THE VALIDATION TEXT WILL BE REMOVED WHEN YOU FINALIZE YOUR NOTE. IF YOU WANT TO FAX A PRELIMINARY NOTE YOU WILL NEED TO TOGGLE THIS TO 'NO' IF YOU DO NOT WANT IT IN YOUR FAXED NOTE.
Consent: Written consent was obtained and risks were reviewed including but not limited to scarring, infection, bleeding, scabbing, incomplete removal, and allergy to anesthesia.
Post-care instructions were reviewed in detail and written instructions are provided. Patient is to keep the biopsy site dry overnight, and then apply bacitracin twice daily until healed. Patient may apply hydrogen peroxide soaks to remove any crusting.
Anesthesia Type: 1% lidocaine with epinephrine
Biopsy Method: Dermablade
Electrodesiccation Text: The wound bed was treated with electrodesiccation after the biopsy was performed.
Dressing: bandage
Type Of Destruction Used: Curettage

## 2022-07-27 ENCOUNTER — OFFICE VISIT (OUTPATIENT)
Dept: ORTHOPEDIC SURGERY | Age: 72
End: 2022-07-27
Payer: MEDICARE

## 2022-07-27 DIAGNOSIS — M54.17 LUMBOSACRAL RADICULOPATHY: Primary | ICD-10-CM

## 2022-07-27 PROCEDURE — 62323 NJX INTERLAMINAR LMBR/SAC: CPT | Performed by: PHYSICAL MEDICINE & REHABILITATION

## 2022-07-27 RX ORDER — TRIAMCINOLONE ACETONIDE 40 MG/ML
100 INJECTION, SUSPENSION INTRA-ARTICULAR; INTRAMUSCULAR ONCE
Status: COMPLETED | OUTPATIENT
Start: 2022-07-27 | End: 2022-07-27

## 2022-07-27 RX ADMIN — TRIAMCINOLONE ACETONIDE 100 MG: 40 INJECTION, SUSPENSION INTRA-ARTICULAR; INTRAMUSCULAR at 10:54

## 2022-07-27 NOTE — PROGRESS NOTES
Date: 07/27/22   Name: Slava Hall    Pre-Procedural Diagnosis:    Diagnosis Orders   1. Lumbosacral radiculopathy  XR INJ SPINE THER SUBST LUM/SAC W IMG          Procedure: Lumbar Epidural Steroid Injection (FAROOQ)    Precautions: [facility] Precautions spine injections: None. Patient denies any prior sensitivity to steroid, local anesthetic, contrast dye, iodine or shellfish. The procedure was discussed at length with the patient and informed consent was signed. The patient was placed in a prone position on the fluoroscopy table and the skin was prepped and draped in a routine sterile fashion. The area to be injected was anesthetized with approximately 5 cc of 1% Lidocaine. Initially a 22-gauge 3.5 inch inch spinal needle was carefully advanced under fluoroscopic guidance to the left L4-L5 epidural space. At this time 0.25 cc of omnipaque administered. . Once proper placement was confirmed, 3 cc of sterile water and 100 mg of Kenalog were injected through the spinal needle. Fluoroscopic guidance was used intermittently over a 10-minute period to insure proper needle placement and patient safety. A hard copy of the fluoroscopic  images has been placed in the patient's chart. The patient was monitored after the procedure and discharged home without complication.      Resume Meds:  Patient to resume asa 81 mg in the AM.    David Clark MD  07/27/22

## 2022-08-22 ENCOUNTER — OFFICE VISIT (OUTPATIENT)
Dept: ORTHOPEDIC SURGERY | Age: 72
End: 2022-08-22
Payer: MEDICARE

## 2022-08-22 DIAGNOSIS — M54.16 LUMBAR RADICULOPATHY: Primary | ICD-10-CM

## 2022-08-22 DIAGNOSIS — M48.061 SPINAL STENOSIS OF LUMBAR REGION WITHOUT NEUROGENIC CLAUDICATION: ICD-10-CM

## 2022-08-22 PROCEDURE — 3017F COLORECTAL CA SCREEN DOC REV: CPT | Performed by: PHYSICAL MEDICINE & REHABILITATION

## 2022-08-22 PROCEDURE — 1123F ACP DISCUSS/DSCN MKR DOCD: CPT | Performed by: PHYSICAL MEDICINE & REHABILITATION

## 2022-08-22 PROCEDURE — 1036F TOBACCO NON-USER: CPT | Performed by: PHYSICAL MEDICINE & REHABILITATION

## 2022-08-22 PROCEDURE — 99214 OFFICE O/P EST MOD 30 MIN: CPT | Performed by: PHYSICAL MEDICINE & REHABILITATION

## 2022-08-22 PROCEDURE — G8428 CUR MEDS NOT DOCUMENT: HCPCS | Performed by: PHYSICAL MEDICINE & REHABILITATION

## 2022-08-22 PROCEDURE — G8417 CALC BMI ABV UP PARAM F/U: HCPCS | Performed by: PHYSICAL MEDICINE & REHABILITATION

## 2022-08-22 NOTE — PROGRESS NOTES
Date:  08/22/22     HPI:  I am seeing Camryn Astudillo in evalution/folowup. He has had problems for some years. In 1989 he had a lumbar disc herniation and did well with surgical intervention. As of late he has had pain more in the right lower lumbar paraspinal area and buttock. A dull pain is worse with going from sitting to standing or prolonged lying down. If he is up and moving, he does much better. The pain is typically nonradiating. He offers no neurologic complaints in the lower extremities. I cannot generate a history of a progressive gait disturbance. He was on Celebrex but now is off of that. He takes ibuprofen as needed. I am unaware of any recent oral steroids. He has had the spine surgical intervention as above and did have some physical therapy of the lumbar spine in the past.  That had a slight benefit. He was evaluated by Alma Mccann PA-C and MR imaging from 2019 revealed moderate central spinal stenosis at the L4-L5 level. He has undergone 2 translaminar epidural steroid injections, 1 in October and the most recent one in late July. The first injection lasted for about 6 months with significant pain reduction and injection from month ago was doing very well to date. He is pleased with how he is doing. He was told the spine surgery could be more of a last resort, but as long as he is doing well with nonsurgical options then we will seed with those.     Past Medical History:   Diagnosis Date    Arthritis     CAD (coronary artery disease)     Chest pain, unspecified 8/31/2012    Environmental and seasonal allergies     H/O echocardiogram 02/01/2018    EF 55-60%    Hyperlipidemia     Hypertension 8/31/2012    Mitral valve regurgitation     minimal     NSTEMI (non-ST elevated myocardial infarction) ([facility] [address] 09/02/2012    3.5X23 Xience MLAD    Post PTCA     Status post left knee replacement 12/5/2018    Varicose veins of both lower extremities 7/6/2016    Varicose veins of legs Past Surgical History:   Procedure Laterality Date    COLONOSCOPY      KNEE ARTHROSCOPY Left 2001    LUMBAR DISCECTOMY  1988    OTHER SURGICAL HISTORY      basal cell cancer removed x2    OTHER SURGICAL HISTORY  2003    Tolentino's Neuroma L foot    MO CARDIAC SURG PROCEDURE UNLIST  2012    PCI x 1        Family History   Problem Relation Age of Onset    Heart Disease Sister     Coronary Art Dis Father     Diabetes Father     No Known Problems Mother         Social History     Socioeconomic History    Marital status:      Spouse name: Not on file    Number of children: Not on file    Years of education: Not on file    Highest education level: Not on file   Occupational History    Not on file   Tobacco Use    Smoking status: Former     Types: Cigarettes     Quit date: 10/5/1981     Years since quittin.9    Smokeless tobacco: Never   Substance and Sexual Activity    Alcohol use: Yes    Drug use: No    Sexual activity: Not on file   Other Topics Concern    Not on file   Social History Narrative    Not on file     Social Determinants of Health     Financial Resource Strain: Not on file   Food Insecurity: Not on file   Transportation Needs: Not on file   Physical Activity: Not on file   Stress: Not on file   Social Connections: Not on file   Intimate Partner Violence: Not on file   Housing Stability: Not on file        Review of Systems       Current Outpatient Medications   Medication Sig Dispense Refill    acetaminophen (TYLENOL) 325 MG tablet Take 325 mg by mouth every 4 hours as needed      amLODIPine (NORVASC) 5 MG tablet Take 5 mg by mouth daily      aspirin 81 MG EC tablet Take by mouth daily      Cetirizine HCl 10 MG CAPS Take by mouth      chlorpheniramine (CHLOR-TRIMETON) 4 MG tablet Take 4 mg by mouth every 6 hours as needed      coenzyme Q10 100 MG CAPS capsule Take 100 mg by mouth daily      nitroGLYCERIN (NITROSTAT) 0.4 MG SL tablet Place 0.4 mg under the tongue      triamcinolone (NASACORT) 55 MCG/ACT nasal inhaler 1 puff in each nostril       No current facility-administered medications for this visit. Allergies   Allergen Reactions    Lisinopril Other (See Comments)    Losartan Other (See Comments)     Patient unsure of reaction          PHYSICAL EXAM    General: Alert and cooperative    HEENT: Clear speech    Motor Exam: Good strength    Sensory Exam: No lateralizing findings    Deep Tendon Reflexes: Decreased reflexes in LE's    Cerebellar Exam: No ataxia in the Ue's    Extremities: Deferred    Gait / Station: Ambulates without assistance    Lumbar Spine: Has no significant lumbar spine tenderness. IMPRESSION/PLAN:   Predominantly Musculoskeletal Lumbosacral Spine Pain. He is doing very well with conservative measures. 2 very nice responses to translaminar epidural steroid injections. He has moderate central spinal stenosis and currently there does not appear to be a spine surgical issue. I do not feel we need to repeat an MRI of the lumbar spine. I will see him back in a year for continuity of care and we can reinject his need dictates.     Monie Segundo MD

## 2022-09-09 ENCOUNTER — OFFICE VISIT (OUTPATIENT)
Dept: CARDIOLOGY CLINIC | Age: 72
End: 2022-09-09
Payer: MEDICARE

## 2022-09-09 VITALS
SYSTOLIC BLOOD PRESSURE: 142 MMHG | WEIGHT: 214 LBS | HEART RATE: 74 BPM | DIASTOLIC BLOOD PRESSURE: 82 MMHG | BODY MASS INDEX: 28.36 KG/M2 | HEIGHT: 73 IN

## 2022-09-09 DIAGNOSIS — E78.00 PURE HYPERCHOLESTEROLEMIA: ICD-10-CM

## 2022-09-09 DIAGNOSIS — I34.0 NONRHEUMATIC MITRAL VALVE REGURGITATION: ICD-10-CM

## 2022-09-09 DIAGNOSIS — I25.118 CORONARY ARTERY DISEASE OF NATIVE ARTERY OF NATIVE HEART WITH STABLE ANGINA PECTORIS (HCC): Primary | ICD-10-CM

## 2022-09-09 DIAGNOSIS — I10 PRIMARY HYPERTENSION: ICD-10-CM

## 2022-09-09 PROCEDURE — 99214 OFFICE O/P EST MOD 30 MIN: CPT | Performed by: INTERNAL MEDICINE

## 2022-09-09 PROCEDURE — 1123F ACP DISCUSS/DSCN MKR DOCD: CPT | Performed by: INTERNAL MEDICINE

## 2022-09-09 PROCEDURE — 3017F COLORECTAL CA SCREEN DOC REV: CPT | Performed by: INTERNAL MEDICINE

## 2022-09-09 PROCEDURE — G8427 DOCREV CUR MEDS BY ELIG CLIN: HCPCS | Performed by: INTERNAL MEDICINE

## 2022-09-09 PROCEDURE — G8417 CALC BMI ABV UP PARAM F/U: HCPCS | Performed by: INTERNAL MEDICINE

## 2022-09-09 PROCEDURE — 1036F TOBACCO NON-USER: CPT | Performed by: INTERNAL MEDICINE

## 2022-09-09 RX ORDER — AMLODIPINE BESYLATE 5 MG/1
5 TABLET ORAL DAILY
Qty: 90 TABLET | Refills: 3 | Status: SHIPPED | OUTPATIENT
Start: 2022-09-09

## 2022-09-09 RX ORDER — CALCIUM CARBONATE 300MG(750)
TABLET,CHEWABLE ORAL
COMMUNITY

## 2022-09-09 ASSESSMENT — ENCOUNTER SYMPTOMS
SHORTNESS OF BREATH: 0
COUGH: 0
BACK PAIN: 0
ABDOMINAL PAIN: 0

## 2022-09-09 NOTE — PROGRESS NOTES
[address]      22      NAME:  Vinita Glass  : 1950  MRN: [id number]      SUBJECTIVE:   Vinita Glass is a 74 y.o. male seen for a follow up visit regarding the following:     Chief Complaint   Patient presents with    Hyperlipidemia    Coronary Artery Disease    Hypertension       HPI:    Patient denies any angina or CHF. Patient is intolerant of most medications. He states his BP is well controlled at home. He remains  active. Past Medical History, Past Surgical History, Family history, Social History, and Medications were all reviewed with the patient today and updated as necessary. Current Outpatient Medications   Medication Sig Dispense Refill    Magnesium 400 MG TABS Take by mouth      Multiple Vitamins-Minerals (PRESERVISION AREDS 2 PO) Take by mouth      amLODIPine (NORVASC) 5 MG tablet Take 1 tablet by mouth daily 90 tablet 3    acetaminophen (TYLENOL) 325 MG tablet Take 325 mg by mouth every 4 hours as needed      aspirin 81 MG EC tablet Take by mouth daily      Cetirizine HCl 10 MG CAPS Take by mouth      chlorpheniramine (CHLOR-TRIMETON) 4 MG tablet Take 4 mg by mouth every 6 hours as needed      coenzyme Q10 100 MG CAPS capsule Take 100 mg by mouth daily      nitroGLYCERIN (NITROSTAT) 0.4 MG SL tablet Place 0.4 mg under the tongue      triamcinolone (NASACORT) 55 MCG/ACT nasal inhaler 1 puff in each nostril       No current facility-administered medications for this visit. Patient Active Problem List    Diagnosis Date Noted    Osteoarthritis of left knee 2018    Status post left knee replacement 2018    Osteoarthritis 2018    Hyponatremia 2018    Mitral valve regurgitation      2016:  Mild to moderate  2017:  Mild to moderate  2018:   Minimal  2022:  Mild         Varicose veins of both lower extremities 2016    Hyperlipidemia     CAD (coronary artery disease) 2012:  NSTEMI -  3.5x23 Nilsonnce mLAD  2016:  EF 55%  2017:  EF 60-65%  2020:  EF 60-65%  2022:  EF 60-65%        Hypertension 2012      Family History   Problem Relation Age of Onset    Heart Disease Sister     Coronary Art Dis Father     Diabetes Father     No Known Problems Mother      Social History     Tobacco Use    Smoking status: Former     Types: Cigarettes     Quit date: 10/5/1981     Years since quittin.9    Smokeless tobacco: Never   Substance Use Topics    Alcohol use: Yes       Review Of Symptoms    Review of Systems   Constitutional: Negative for fever and malaise/fatigue. HENT:  Negative for nosebleeds. Cardiovascular:  Negative for chest pain, dyspnea on exertion and palpitations. Respiratory:  Negative for cough and shortness of breath. Musculoskeletal:  Negative for back pain, muscle cramps, muscle weakness and myalgias. Gastrointestinal:  Negative for abdominal pain. Neurological:  Negative for dizziness. Physical Exam  Blood pressure (!) 142/82, pulse 74, height 6' 1\" (1.854 m), weight 214 lb (97.1 kg). Physical Exam  HENT:      Head: Normocephalic and atraumatic. Eyes:      Extraocular Movements: Extraocular movements intact. Cardiovascular:      Rate and Rhythm: Normal rate and regular rhythm. Heart sounds: No murmur heard. Pulmonary:      Effort: Pulmonary effort is normal.      Breath sounds: Normal breath sounds. Abdominal:      Palpations: Abdomen is soft. Musculoskeletal:         General: Normal range of motion. Skin:     General: Skin is warm and dry. Neurological:      General: No focal deficit present. Mental Status: He is oriented to person, place, and time. Medical problems, medical history and test results were reviewed with the patient today.       No results found for: CHOL  No results found for: TRIG  No results found for: HDL  No results found for: LDLCHOLESTEROL, LDLCALC  No results found for: LABVLDL, VLDL  No results found for: CHOLHDLRATIO     No results found for: LABA1C  No results found for: EAG     No results found for: NA, K, CL, CO2, BUN, CREATININE, GLUCOSE, CALCIUM, PROT, LABALBU, BILITOT, ALKPHOS, AST, ALT, LABGLOM, GFRAA, AGRATIO, GLOB    No results found for: NA, K, CL, CO2, BUN, CREATININE, GLUCOSE, CALCIUM     No results found for: WBC, HGB, HCT, MCV, PLT          ASSESSMENT:    Lia Chen was seen today for follow-up. Diagnoses and all orders for this visit:      Coronary artery disease of native artery of native heart with stable angina pectoris [facility]) - Medical therapy limited by medication intolerances. He denies angina and is active. Essential hypertension, hypertension with unspecified goal - Generally well controlled at home. This is consistent with white coat hypertension. He remains resistant to medications. Taking amlodipine. Pure hypercholesterolemia - Not on statin therapy as intolerant. LDL is 104 05/2022. On no therapy. He is committed to diet and exercise. Non-rheumatic mitral regurgitation - Minimal by echo 09/2022. Problem List Items Addressed This Visit          Circulatory    Mitral valve regurgitation    Relevant Medications    amLODIPine (NORVASC) 5 MG tablet    Hypertension    CAD (coronary artery disease) - Primary    Relevant Medications    amLODIPine (NORVASC) 5 MG tablet       Other    Hyperlipidemia    Relevant Medications    amLODIPine (NORVASC) 5 MG tablet       Medications Discontinued During This Encounter   Medication Reason    amLODIPine (NORVASC) 5 MG tablet REORDER                   Patient has been instructed and agrees to call our office with any issues or other concerns related to their cardiac condition(s) and/or complaint(s). Return in about 6 months (around 3/9/2023).        Jacy Avila MD  9/9/2022

## 2022-11-21 ENCOUNTER — TELEPHONE (OUTPATIENT)
Dept: ORTHOPEDIC SURGERY | Age: 72
End: 2022-11-21

## 2022-12-14 ENCOUNTER — OFFICE VISIT (OUTPATIENT)
Dept: ORTHOPEDIC SURGERY | Age: 72
End: 2022-12-14
Payer: MEDICARE

## 2022-12-14 DIAGNOSIS — M54.16 LUMBAR RADICULOPATHY: Primary | ICD-10-CM

## 2022-12-14 PROCEDURE — 62323 NJX INTERLAMINAR LMBR/SAC: CPT | Performed by: PHYSICAL MEDICINE & REHABILITATION

## 2022-12-14 RX ORDER — TRIAMCINOLONE ACETONIDE 40 MG/ML
100 INJECTION, SUSPENSION INTRA-ARTICULAR; INTRAMUSCULAR ONCE
Status: COMPLETED | OUTPATIENT
Start: 2022-12-14 | End: 2022-12-14

## 2022-12-14 RX ADMIN — TRIAMCINOLONE ACETONIDE 100 MG: 40 INJECTION, SUSPENSION INTRA-ARTICULAR; INTRAMUSCULAR at 10:13

## 2022-12-14 NOTE — PROGRESS NOTES
Date: 12/14/22   Name: Mildred Carrel    Pre-Procedural Diagnosis:    Diagnosis Orders   1. Lumbar radiculopathy  XR INJ SPINE THER SUBST LUM/SAC W IMG          Procedure: Lumbar Epidural Steroid Injection (FAROOQ)    Precautions: [facility] Precautions spine injections: None. Patient denies any prior sensitivity to steroid, local anesthetic, contrast dye, iodine or shellfish. The procedure was discussed at length with the patient and informed consent was signed. The patient was placed in a prone position on the fluoroscopy table and the skin was prepped and draped in a routine sterile fashion. The area to be injected was anesthetized with approximately 5 cc of 1% Lidocaine. Initially a 22-gauge 3.5 inch inch spinal needle was carefully advanced under fluoroscopic guidance to the left L4-L5 epidural space. At this time 0.25 cc of omnipaque administered. . Once proper placement was confirmed, 1.5 cc of sterile water and 100 mg of Kenalog were injected through the spinal needle. Fluoroscopic guidance was used intermittently over a 10-minute period to insure proper needle placement and patient safety. A hard copy of the fluoroscopic  images has been placed in the patient's chart. The patient was monitored after the procedure and discharged home without complication.      Resume Meds:  Remains on asa 81 mg    L Efrem Caldera MD  12/14/22

## 2022-12-17 ENCOUNTER — PATIENT MESSAGE (OUTPATIENT)
Dept: CARDIOLOGY CLINIC | Age: 72
End: 2022-12-17

## 2022-12-19 NOTE — TELEPHONE ENCOUNTER
From: Pastor Chan  To: Dr. Aldair Pedro: 12/17/2022 7:40 AM EST  Subject: Dontrell Jefferson to increase blood flow    Have you seen the [facility] developed Vinia grape wine reduction to pill form. The reviews look good and I would like to give it a try. I can't see a down side but wanted your opinion. Thanks, Dayan Bui hope you your family and your staff have a Blessed Candida.

## 2022-12-19 NOTE — TELEPHONE ENCOUNTER
Osteoarthritis of left knee 12/05/2018    Status post left knee replacement 12/05/2018    Osteoarthritis 12/05/2018    Hyponatremia 11/13/2018    Mitral valve regurgitation         02/2016:  Mild to moderate  02/2017:  Mild to moderate  02/2018:   Minimal  09/2022:  Mild           Varicose veins of both lower extremities 07/06/2016    Hyperlipidemia      CAD (coronary artery disease) 09/02/2012 08/2012:  NSTEMI -  3.5x23 Xience mLAD  02/2016:  EF 55%  02/2017:  EF 60-65%  02/2020:  EF 60-65%  09/2022:  EF 60-65%          Hypertension 08/31/2012

## 2023-01-30 ENCOUNTER — APPOINTMENT (RX ONLY)
Dept: URBAN - METROPOLITAN AREA CLINIC 24 | Facility: CLINIC | Age: 73
Setting detail: DERMATOLOGY
End: 2023-01-30

## 2023-01-30 ENCOUNTER — RX ONLY (OUTPATIENT)
Age: 73
Setting detail: RX ONLY
End: 2023-01-30

## 2023-01-30 DIAGNOSIS — L57.0 ACTINIC KERATOSIS: ICD-10-CM

## 2023-01-30 DIAGNOSIS — L82.1 OTHER SEBORRHEIC KERATOSIS: ICD-10-CM

## 2023-01-30 DIAGNOSIS — D485 NEOPLASM OF UNCERTAIN BEHAVIOR OF SKIN: ICD-10-CM

## 2023-01-30 DIAGNOSIS — D22 MELANOCYTIC NEVI: ICD-10-CM

## 2023-01-30 DIAGNOSIS — Z85.828 PERSONAL HISTORY OF OTHER MALIGNANT NEOPLASM OF SKIN: ICD-10-CM

## 2023-01-30 DIAGNOSIS — Z87.2 PERSONAL HISTORY OF DISEASES OF THE SKIN AND SUBCUTANEOUS TISSUE: ICD-10-CM

## 2023-01-30 DIAGNOSIS — L57.8 OTHER SKIN CHANGES DUE TO CHRONIC EXPOSURE TO NONIONIZING RADIATION: ICD-10-CM | Status: INADEQUATELY CONTROLLED

## 2023-01-30 PROBLEM — D22.5 MELANOCYTIC NEVI OF TRUNK: Status: ACTIVE | Noted: 2023-01-30

## 2023-01-30 PROBLEM — D48.5 NEOPLASM OF UNCERTAIN BEHAVIOR OF SKIN: Status: ACTIVE | Noted: 2023-01-30

## 2023-01-30 PROCEDURE — 99214 OFFICE O/P EST MOD 30 MIN: CPT | Mod: 25

## 2023-01-30 PROCEDURE — 17000 DESTRUCT PREMALG LESION: CPT | Mod: 59

## 2023-01-30 PROCEDURE — 17003 DESTRUCT PREMALG LES 2-14: CPT

## 2023-01-30 PROCEDURE — ? BIOPSY BY SHAVE METHOD

## 2023-01-30 PROCEDURE — ? COUNSELING

## 2023-01-30 PROCEDURE — 11102 TANGNTL BX SKIN SINGLE LES: CPT

## 2023-01-30 PROCEDURE — ? PRESCRIPTION MEDICATION MANAGEMENT

## 2023-01-30 PROCEDURE — ? LIQUID NITROGEN

## 2023-01-30 RX ORDER — FLUOROURACIL 5 MG/G
CREAM TOPICAL
Qty: 40 | Refills: 0 | Status: CANCELLED | COMMUNITY
Start: 2023-01-30

## 2023-01-30 ASSESSMENT — LOCATION ZONE DERM
LOCATION ZONE: ARM
LOCATION ZONE: NECK
LOCATION ZONE: NOSE
LOCATION ZONE: TRUNK
LOCATION ZONE: FACE
LOCATION ZONE: SCALP

## 2023-01-30 ASSESSMENT — LOCATION SIMPLE DESCRIPTION DERM
LOCATION SIMPLE: RIGHT TEMPLE
LOCATION SIMPLE: RIGHT ZYGOMA
LOCATION SIMPLE: NOSE
LOCATION SIMPLE: LEFT FOREARM
LOCATION SIMPLE: SCALP
LOCATION SIMPLE: LEFT CHEEK
LOCATION SIMPLE: RIGHT SCALP
LOCATION SIMPLE: LEFT UPPER BACK
LOCATION SIMPLE: LEFT FOREHEAD
LOCATION SIMPLE: RIGHT OCCIPITAL SCALP
LOCATION SIMPLE: NECK
LOCATION SIMPLE: LEFT SHOULDER
LOCATION SIMPLE: CHEST
LOCATION SIMPLE: ABDOMEN
LOCATION SIMPLE: RIGHT UPPER BACK
LOCATION SIMPLE: RIGHT FOREHEAD

## 2023-01-30 ASSESSMENT — LOCATION DETAILED DESCRIPTION DERM
LOCATION DETAILED: LEFT POSTERIOR SHOULDER
LOCATION DETAILED: RIGHT MID-UPPER BACK
LOCATION DETAILED: RIGHT MEDIAL INFERIOR CHEST
LOCATION DETAILED: RIGHT INFERIOR UPPER BACK
LOCATION DETAILED: RIGHT SUPERIOR OCCIPITAL SCALP
LOCATION DETAILED: RIGHT CENTRAL ZYGOMA
LOCATION DETAILED: RIGHT CENTRAL FRONTAL SCALP
LOCATION DETAILED: RIGHT SUPERIOR FOREHEAD
LOCATION DETAILED: LEFT DISTAL RADIAL DORSAL FOREARM
LOCATION DETAILED: PERIUMBILICAL SKIN
LOCATION DETAILED: LEFT CENTRAL PARIETAL SCALP
LOCATION DETAILED: RIGHT LATERAL ZYGOMA
LOCATION DETAILED: LEFT SUPERIOR UPPER BACK
LOCATION DETAILED: NASAL DORSUM
LOCATION DETAILED: RIGHT SUPERIOR LATERAL NECK
LOCATION DETAILED: EPIGASTRIC SKIN
LOCATION DETAILED: LEFT SUPERIOR CENTRAL MALAR CHEEK
LOCATION DETAILED: LEFT SUPERIOR FOREHEAD
LOCATION DETAILED: RIGHT CENTRAL TEMPLE

## 2023-01-30 NOTE — PROCEDURE: BIOPSY BY SHAVE METHOD
Detail Level: Detailed
Depth Of Biopsy: dermis
Was A Bandage Applied: Yes
Size Of Lesion In Cm: 0
Biopsy Type: H and E
Biopsy Method: Dermablade
Anesthesia Type: 1% lidocaine with epinephrine
Anesthesia Volume In Cc: 0.5
Hemostasis: Drysol
Wound Care: Petrolatum
Dressing: bandage
Destruction After The Procedure: No
Type Of Destruction Used: Curettage
Curettage Text: The wound bed was treated with curettage after the biopsy was performed.
Cryotherapy Text: The wound bed was treated with cryotherapy after the biopsy was performed.
Electrodesiccation Text: The wound bed was treated with electrodesiccation after the biopsy was performed.
Electrodesiccation And Curettage Text: The wound bed was treated with electrodesiccation and curettage after the biopsy was performed.
Silver Nitrate Text: The wound bed was treated with silver nitrate after the biopsy was performed.
Lab: [id number]
Lab Facility: [id number]
Consent: Written consent was obtained and risks were reviewed including but not limited to scarring, infection, bleeding, scabbing, incomplete removal, nerve damage and allergy to anesthesia.
Post-Care Instructions: I reviewed with the patient in detail post-care instructions. Patient is to keep the biopsy site dry overnight, and then apply bacitracin twice daily until healed. Patient may apply hydrogen peroxide soaks to remove any crusting.
Notification Instructions: Patient will be notified of biopsy results. However, patient instructed to call the office if not contacted within 2 weeks.
Billing Type: Third-Party Bill
Information: Selecting Yes will display possible errors in your note based on the variables you have selected. This validation is only offered as a suggestion for you. PLEASE NOTE THAT THE VALIDATION TEXT WILL BE REMOVED WHEN YOU FINALIZE YOUR NOTE. IF YOU WANT TO FAX A PRELIMINARY NOTE YOU WILL NEED TO TOGGLE THIS TO 'NO' IF YOU DO NOT WANT IT IN YOUR FAXED NOTE.

## 2023-01-30 NOTE — PROCEDURE: PRESCRIPTION MEDICATION MANAGEMENT
Detail Level: Zone
Render In Strict Bullet Format?: No
Initiate Treatment: Fluorouracil 5% cream apply BID X 2 weeks to left cheek

## 2023-01-31 ENCOUNTER — RX ONLY (OUTPATIENT)
Age: 73
Setting detail: RX ONLY
End: 2023-01-31

## 2023-01-31 RX ORDER — FLUOROURACIL 5 MG/G
CREAM TOPICAL
Qty: 40 | Refills: 0 | Status: ERX

## 2023-02-14 ENCOUNTER — APPOINTMENT (RX ONLY)
Dept: URBAN - METROPOLITAN AREA CLINIC 24 | Facility: CLINIC | Age: 73
Setting detail: DERMATOLOGY
End: 2023-02-14

## 2023-02-14 PROBLEM — C44.619 BASAL CELL CARCINOMA OF SKIN OF LEFT UPPER LIMB, INCLUDING SHOULDER: Status: ACTIVE | Noted: 2023-02-14

## 2023-02-14 PROCEDURE — 12032 INTMD RPR S/A/T/EXT 2.6-7.5: CPT

## 2023-02-14 PROCEDURE — 11602 EXC TR-EXT MAL+MARG 1.1-2 CM: CPT

## 2023-02-14 PROCEDURE — ? EXCISION

## 2023-02-28 ENCOUNTER — APPOINTMENT (RX ONLY)
Dept: URBAN - METROPOLITAN AREA CLINIC 24 | Facility: CLINIC | Age: 73
Setting detail: DERMATOLOGY
End: 2023-02-28

## 2023-02-28 DIAGNOSIS — Z48.01 ENCOUNTER FOR CHANGE OR REMOVAL OF SURGICAL WOUND DRESSING: ICD-10-CM

## 2023-02-28 PROCEDURE — ? SUTURE REMOVAL (GLOBAL PERIOD)

## 2023-02-28 PROCEDURE — 99024 POSTOP FOLLOW-UP VISIT: CPT

## 2023-02-28 ASSESSMENT — LOCATION DETAILED DESCRIPTION DERM: LOCATION DETAILED: LEFT POSTERIOR SHOULDER

## 2023-02-28 ASSESSMENT — LOCATION ZONE DERM: LOCATION ZONE: ARM

## 2023-02-28 ASSESSMENT — LOCATION SIMPLE DESCRIPTION DERM: LOCATION SIMPLE: LEFT SHOULDER

## 2023-02-28 NOTE — PROCEDURE: SUTURE REMOVAL (GLOBAL PERIOD)
Detail Level: Detailed
Add 45801 Cpt? (Important Note: In 2017 The Use Of 85274 Is Being Tracked By Cms To Determine Future Global Period Reimbursement For Global Periods): yes

## 2023-03-22 ENCOUNTER — OFFICE VISIT (OUTPATIENT)
Dept: CARDIOLOGY CLINIC | Age: 73
End: 2023-03-22
Payer: MEDICARE

## 2023-03-22 VITALS
HEART RATE: 64 BPM | DIASTOLIC BLOOD PRESSURE: 78 MMHG | WEIGHT: 222.4 LBS | SYSTOLIC BLOOD PRESSURE: 134 MMHG | BODY MASS INDEX: 29.48 KG/M2 | HEIGHT: 73 IN

## 2023-03-22 DIAGNOSIS — I25.118 CORONARY ARTERY DISEASE OF NATIVE ARTERY OF NATIVE HEART WITH STABLE ANGINA PECTORIS (HCC): Primary | ICD-10-CM

## 2023-03-22 DIAGNOSIS — I10 PRIMARY HYPERTENSION: ICD-10-CM

## 2023-03-22 DIAGNOSIS — E78.00 PURE HYPERCHOLESTEROLEMIA: ICD-10-CM

## 2023-03-22 DIAGNOSIS — I34.0 NONRHEUMATIC MITRAL VALVE REGURGITATION: ICD-10-CM

## 2023-03-22 DIAGNOSIS — I10 ESSENTIAL (PRIMARY) HYPERTENSION: ICD-10-CM

## 2023-03-22 PROCEDURE — 93000 ELECTROCARDIOGRAM COMPLETE: CPT | Performed by: INTERNAL MEDICINE

## 2023-03-22 PROCEDURE — G8417 CALC BMI ABV UP PARAM F/U: HCPCS | Performed by: INTERNAL MEDICINE

## 2023-03-22 PROCEDURE — 3078F DIAST BP <80 MM HG: CPT | Performed by: INTERNAL MEDICINE

## 2023-03-22 PROCEDURE — 3075F SYST BP GE 130 - 139MM HG: CPT | Performed by: INTERNAL MEDICINE

## 2023-03-22 PROCEDURE — 99214 OFFICE O/P EST MOD 30 MIN: CPT | Performed by: INTERNAL MEDICINE

## 2023-03-22 PROCEDURE — 1123F ACP DISCUSS/DSCN MKR DOCD: CPT | Performed by: INTERNAL MEDICINE

## 2023-03-22 PROCEDURE — 1036F TOBACCO NON-USER: CPT | Performed by: INTERNAL MEDICINE

## 2023-03-22 PROCEDURE — G8484 FLU IMMUNIZE NO ADMIN: HCPCS | Performed by: INTERNAL MEDICINE

## 2023-03-22 PROCEDURE — G8428 CUR MEDS NOT DOCUMENT: HCPCS | Performed by: INTERNAL MEDICINE

## 2023-03-22 PROCEDURE — 3017F COLORECTAL CA SCREEN DOC REV: CPT | Performed by: INTERNAL MEDICINE

## 2023-03-22 ASSESSMENT — ENCOUNTER SYMPTOMS
BACK PAIN: 0
COUGH: 0
SHORTNESS OF BREATH: 0
ABDOMINAL PAIN: 0

## 2023-03-22 NOTE — PROGRESS NOTES
[address]      23      NAME:  Irais Schmitz  : 1950  MRN: [id number]      SUBJECTIVE:   Irais Schmitz is a 74 y.o. male seen for a follow up visit regarding the following:     Chief Complaint   Patient presents with    Hyperlipidemia    Hypertension    Coronary Artery Disease       HPI:    Patient denies any angina or CHF. Patient is intolerant of most medications. He states his BP is well controlled at home. He remains  active. Past Medical History, Past Surgical History, Family history, Social History, and Medications were all reviewed with the patient today and updated as necessary. Current Outpatient Medications   Medication Sig Dispense Refill    Magnesium 400 MG TABS Take by mouth      Multiple Vitamins-Minerals (PRESERVISION AREDS 2 PO) Take by mouth      amLODIPine (NORVASC) 5 MG tablet Take 1 tablet by mouth daily 90 tablet 3    acetaminophen (TYLENOL) 325 MG tablet Take 325 mg by mouth every 4 hours as needed      aspirin 81 MG EC tablet Take by mouth daily      Cetirizine HCl 10 MG CAPS Take by mouth      chlorpheniramine (CHLOR-TRIMETON) 4 MG tablet Take 4 mg by mouth every 6 hours as needed      coenzyme Q10 100 MG CAPS capsule Take 100 mg by mouth daily      nitroGLYCERIN (NITROSTAT) 0.4 MG SL tablet Place 0.4 mg under the tongue      triamcinolone (NASACORT) 55 MCG/ACT nasal inhaler 1 puff in each nostril       No current facility-administered medications for this visit. Patient Active Problem List    Diagnosis Date Noted    Osteoarthritis of left knee 2018    Status post left knee replacement 2018    Osteoarthritis 2018    Hyponatremia 2018    Mitral valve regurgitation      2016:  Mild to moderate  2017:  Mild to moderate  2018:   Minimal  2022:  Mild         Varicose veins of both lower extremities 2016    Hyperlipidemia     CAD (coronary artery disease)

## 2023-04-21 ENCOUNTER — TELEPHONE (OUTPATIENT)
Dept: ORTHOPEDIC SURGERY | Age: 73
End: 2023-04-21

## 2023-04-26 ENCOUNTER — OFFICE VISIT (OUTPATIENT)
Dept: ORTHOPEDIC SURGERY | Age: 73
End: 2023-04-26

## 2023-04-26 DIAGNOSIS — M54.16 LUMBAR RADICULOPATHY: Primary | ICD-10-CM

## 2023-04-26 DIAGNOSIS — M48.061 SPINAL STENOSIS OF LUMBAR REGION WITHOUT NEUROGENIC CLAUDICATION: ICD-10-CM

## 2023-04-26 RX ORDER — TRIAMCINOLONE ACETONIDE 40 MG/ML
100 INJECTION, SUSPENSION INTRA-ARTICULAR; INTRAMUSCULAR ONCE
Status: COMPLETED | OUTPATIENT
Start: 2023-04-26 | End: 2023-04-26

## 2023-04-26 RX ADMIN — TRIAMCINOLONE ACETONIDE 100 MG: 40 INJECTION, SUSPENSION INTRA-ARTICULAR; INTRAMUSCULAR at 09:27

## 2023-04-26 NOTE — PROGRESS NOTES
Date: 04/26/23   Name: Garcia Banerjee    Pre-Procedural Diagnosis:    Diagnosis Orders   1. Lumbar radiculopathy  XR INJ SPINE THER SUBST LUM/SAC W IMG    triamcinolone acetonide (KENALOG-40) injection 100 mg    Amb External Referral To Physical Therapy      2. Spinal stenosis of lumbar region without neurogenic claudication  Amb External Referral To Physical Therapy          Procedure: Lumbar Epidural Steroid Injection (FAROOQ)    Precautions: [facility] Precautions spine injections: None. Patient denies any prior sensitivity to steroid, local anesthetic, contrast dye, iodine or shellfish. The procedure was discussed at length with the patient and informed consent was signed. The patient was placed in a prone position on the fluoroscopy table and the skin was prepped and draped in a routine sterile fashion. The area to be injected was anesthetized with approximately 5 cc of 1% Lidocaine. Initially a 22-gauge 3.5 inch spinal needle was carefully advanced under fluoroscopic guidance to the left L4-L5 epidural space. At this time 0.25 cc of omnipaque administered. . Once proper placement was confirmed, 1.5 cc of sterile water and 100 mg of Kenalog were injected through the spinal needle. Fluoroscopic guidance was used intermittently over a 10-minute period to insure proper needle placement and patient safety. A hard copy of the fluoroscopic  images has been placed in the patient's chart. The patient was monitored after the procedure and discharged home without complication. A total of 2.5 cc of Kenalog were administered during this procedure.     Resume Meds:  Pt remains on asa 81 mg.    Marta Lopez MD  04/26/23

## 2023-05-04 ENCOUNTER — HOSPITAL ENCOUNTER (OUTPATIENT)
Dept: PHYSICAL THERAPY | Age: 73
Setting detail: RECURRING SERIES
Discharge: HOME | End: 2023-05-07
Payer: MEDICARE

## 2023-05-04 PROCEDURE — 97161 PT EVAL LOW COMPLEX 20 MIN: CPT

## 2023-05-04 PROCEDURE — 97110 THERAPEUTIC EXERCISES: CPT

## 2023-05-04 ASSESSMENT — PAIN SCALES - GENERAL: PAINLEVEL_OUTOF10: 2

## 2023-05-04 ASSESSMENT — PAIN DESCRIPTION - ORIENTATION: ORIENTATION: RIGHT

## 2023-05-04 ASSESSMENT — PAIN DESCRIPTION - PAIN TYPE: TYPE: CHRONIC PAIN

## 2023-05-04 ASSESSMENT — PAIN DESCRIPTION - DESCRIPTORS: DESCRIPTORS: ACHING;SORE;PRESSURE;SHARP

## 2023-05-04 ASSESSMENT — PAIN DESCRIPTION - LOCATION: LOCATION: HIP;BACK

## 2023-05-10 ENCOUNTER — HOSPITAL ENCOUNTER (OUTPATIENT)
Dept: PHYSICAL THERAPY | Age: 73
Setting detail: RECURRING SERIES
Discharge: HOME | End: 2023-05-13
Payer: MEDICARE

## 2023-05-10 PROCEDURE — 97110 THERAPEUTIC EXERCISES: CPT

## 2023-05-10 ASSESSMENT — PAIN SCALES - GENERAL: PAINLEVEL_OUTOF10: 2

## 2023-05-10 NOTE — PROGRESS NOTES
Christine Orta  : 1950  Primary: Medicare Part [place] (Medicare)  [place]: [employer] [place] [employer] [employer]  [address] Jessica Dailey  [place] [address]  Phone: [phone]  Fax: [phone] Plan Frequency: 1-2x/week x 90 days  Plan of Care/Certification Expiration Date: 23      >PT Visit Info:  Plan Frequency: 1-2x/week x 90 days  Plan of Care/Certification Expiration Date: 23  Total # of Visits to Date: 2      Visit Count:  2    OUTPATIENT PHYSICAL THERAPY:OP NOTE TYPE: Treatment Note 5/10/2023       Episode  }Appt Desk             Treatment Diagnosis:  Pain in Right Hip (M25.551)  Stiffness of Right Hip, Not elsewhere classified (M25.651)  Difficulty in walking, Not elsewhere classified (R26.2)  Generalized Muscle Weakness (M62.81)  Vertebrogenic Low Back Pain (M54.51)  Medical/Referring Diagnosis:  Lumbar radiculopathy [M54.16]  Spinal stenosis of lumbar region without neurogenic claudication [M48.061]  Referring Physician:  Rajan Guillen MD MD Orders:  PT Eval and Treat   Date of Onset:  Onset Date: 18     Allergies:   Lisinopril, Losartan, and Statins  Restrictions/Precautions:  Restrictions/Precautions: None  No data recorded   Interventions Planned (Treatment may consist of any combination of the following):    Current Treatment Recommendations: Strengthening; Gait training; ROM; Stair training; Balance training; Neuromuscular re-education; Functional mobility training; Manual; Pain management; Home exercise program; Positioning; Modalities     >Subjective Comments:  Patient reports general soreness today from doing yardwork yesterday. >Initial:     2/10>Post Session:       1/10  Medications Last Reviewed:  5/10/2023  Updated Objective Findings:   Neutral pelvic and lumbar alignment today ;True min R leg length discrepancy due to R knee OA.    Treatment   THERAPEUTIC EXERCISE: (45 minutes):    Exercises per grid below to improve mobility, strength, balance, and

## 2023-05-16 ENCOUNTER — HOSPITAL ENCOUNTER (OUTPATIENT)
Dept: PHYSICAL THERAPY | Age: 73
Setting detail: RECURRING SERIES
Discharge: HOME | End: 2023-05-19
Payer: MEDICARE

## 2023-05-16 PROCEDURE — 97110 THERAPEUTIC EXERCISES: CPT

## 2023-05-16 ASSESSMENT — PAIN SCALES - GENERAL: PAINLEVEL_OUTOF10: 3

## 2023-05-16 NOTE — PROGRESS NOTES
Gwen Soler  : 1950  Primary: Medicare Part [place] B (Medicare)  Secondary: [employer] [place] [employer]  [address] Gemma Garcia [address] [place] [address] Way [address]  Phone: [phone]  Fax: [phone] Plan Frequency: 1-2x/week x 90 days  Plan of Care/Certification Expiration Date: 23      >PT Visit Info:  Plan Frequency: 1-2x/week x 90 days  Plan of Care/Certification Expiration Date: 23  Total # of Visits to Date: 3      Visit Count:  3    OUTPATIENT PHYSICAL THERAPY:OP NOTE TYPE: Treatment Note 2023       Episode  }Appt Desk             Treatment Diagnosis:  Pain in Right Hip (M25.551)  Stiffness of Right Hip, Not elsewhere classified (M25.651)  Difficulty in walking, Not elsewhere classified (R26.2)  Generalized Muscle Weakness (M62.81)  Vertebrogenic Low Back Pain (M54.51)  Medical/Referring Diagnosis:  Lumbar radiculopathy [M54.16]  Spinal stenosis of lumbar region without neurogenic claudication [M48.061]  Referring Physician:  Ramo Montgomery MD MD Orders:  PT Eval and Treat   Date of Onset:  Onset Date: 18     Allergies:   Lisinopril, Losartan, and Statins  Restrictions/Precautions:  Restrictions/Precautions: None  No data recorded   Interventions Planned (Treatment may consist of any combination of the following):    Current Treatment Recommendations: Strengthening; Gait training; ROM; Stair training; Balance training; Neuromuscular re-education; Functional mobility training; Manual; Pain management; Home exercise program; Positioning; Modalities     >Subjective Comments:  Patient reports back stiffness and soreness today from doing yardwork last week and over the weekend. He reports trying to make more of an effort for standing up straight.  >Initial:     3/10>Post Session:       2/10  Medications Last Reviewed:  2023  Updated Objective Findings:   Neutral pelvic and lumbar alignment today ;True min R leg length discrepancy due to R knee OA.    Treatment   THERAPEUTIC

## 2023-05-18 ENCOUNTER — HOSPITAL ENCOUNTER (OUTPATIENT)
Dept: PHYSICAL THERAPY | Age: 73
Setting detail: RECURRING SERIES
Discharge: HOME | End: 2023-05-21
Payer: MEDICARE

## 2023-05-18 PROCEDURE — 97110 THERAPEUTIC EXERCISES: CPT

## 2023-05-18 ASSESSMENT — PAIN SCALES - GENERAL: PAINLEVEL_OUTOF10: 2

## 2023-05-22 ENCOUNTER — HOSPITAL ENCOUNTER (OUTPATIENT)
Dept: PHYSICAL THERAPY | Age: 73
Setting detail: RECURRING SERIES
Discharge: HOME | End: 2023-05-25
Payer: MEDICARE

## 2023-05-22 PROCEDURE — 97110 THERAPEUTIC EXERCISES: CPT

## 2023-05-22 ASSESSMENT — PAIN SCALES - GENERAL: PAINLEVEL_OUTOF10: 1

## 2023-05-22 NOTE — PROGRESS NOTES
Kamar Thorne  : 1950  Primary: Medicare Part A [place] B (Medicare)  [place] [facility] [employer]  [address] Reginaldo Randle [place] [address]  Phone: [phone]  Fax: [phone] Plan Frequency: 1-2x/week x 90 days  Plan of Care/Certification Expiration Date: 23      >PT Visit Info:  Plan Frequency: 1-2x/week x 90 days  Plan of Care/Certification Expiration Date: 23  Total # of Visits to Date: 5      Visit Count:  5    OUTPATIENT PHYSICAL THERAPY:OP NOTE TYPE: Treatment Note 2023       Episode  }Appt Desk             Treatment Diagnosis:  Pain in Right Hip (M25.551)  Stiffness of Right Hip, Not elsewhere classified (M25.651)  Difficulty in walking, Not elsewhere classified (R26.2)  Generalized Muscle Weakness (M62.81)  Vertebrogenic Low Back Pain (M54.51)  Medical/Referring Diagnosis:  Lumbar radiculopathy [M54.16]  Spinal stenosis of lumbar region without neurogenic claudication [M48.061]  Referring Physician:  Shirley Swartz MD MD Orders:  PT Eval and Treat   Date of Onset:  Onset Date: 18     Allergies:   Lisinopril, Losartan, and Statins  Restrictions/Precautions:  Restrictions/Precautions: None  No data recorded   Interventions Planned (Treatment may consist of any combination of the following):    Current Treatment Recommendations: Strengthening; Gait training; ROM; Stair training; Balance training; Neuromuscular re-education; Functional mobility training; Manual; Pain management; Home exercise program; Positioning; Modalities     >Subjective Comments:  Patient reports general soreness today more from working under his house over the weekend. >Initial:     1/10>Post Session:       1/10  Medications Last Reviewed:  2023  Updated Objective Findings:   Neutral pelvic and lumbar alignment today ;True min R leg length discrepancy due to R knee OA.    Treatment   THERAPEUTIC EXERCISE: (45 minutes):    Exercises per grid below to improve mobility, strength,

## 2023-05-25 ENCOUNTER — HOSPITAL ENCOUNTER (OUTPATIENT)
Dept: PHYSICAL THERAPY | Age: 73
Setting detail: RECURRING SERIES
Discharge: HOME | End: 2023-05-28
Payer: MEDICARE

## 2023-05-25 PROCEDURE — 97110 THERAPEUTIC EXERCISES: CPT

## 2023-05-25 ASSESSMENT — PAIN SCALES - GENERAL: PAINLEVEL_OUTOF10: 1

## 2023-05-30 ENCOUNTER — HOSPITAL ENCOUNTER (OUTPATIENT)
Dept: PHYSICAL THERAPY | Age: 73
Setting detail: RECURRING SERIES
Discharge: HOME | End: 2023-06-02
Payer: MEDICARE

## 2023-05-30 PROCEDURE — 97110 THERAPEUTIC EXERCISES: CPT

## 2023-05-30 ASSESSMENT — PAIN SCALES - GENERAL: PAINLEVEL_OUTOF10: 1

## 2023-05-30 NOTE — THERAPY DISCHARGE
Evelia Rowley  : 1950  Primary: Medicare Part A [place] B (Medicare)  [place] [place] [facility] [employer]  [address] Geovanny Cortes [address] [place] [address] Way [address]  Phone: [phone]  Fax: [phone] Plan Frequency: 1-2x/week x 90 days  Plan of Care/Certification Expiration Date: 23      PT Visit Info:  Plan Frequency: 1-2x/week x 90 days  Plan of Care/Certification Expiration Date: 23  Total # of Visits to Date: 7      Visit Count:  7                OUTPATIENT PHYSICAL THERAPY:             OP NOTE TYPE: Discharge Summary 2023               Episode (R side LBP) Appt Desk         Treatment Diagnosis:  Pain in Right Hip (M25.551)  Stiffness of Right Hip, Not elsewhere classified (M25.651)  Difficulty in walking, Not elsewhere classified (R26.2)  Generalized Muscle Weakness (M62.81)  Vertebrogenic Low Back Pain (M54.51)  Medical/Referring Diagnosis:  Lumbar radiculopathy [M54.16]  Spinal stenosis of lumbar region without neurogenic claudication [M48.061]  Referring Physician:  Tammy Starr MD MD Orders:  PT Eval and Treat   Return MD Appt:  23  Date of Onset:  Onset Date: 18      Allergies:  Lisinopril, Losartan, and Statins  Restrictions/Precautions:    Restrictions/Precautions: None      Medications Last Reviewed:  2023     SUBJECTIVE   History of Injury/Illness (Reason for Referral):  Patient reports he's had back pain for years that began in 2017. He states he's managed it most of the time with altering activities and Tylenol if needed. He states over the last 2-3 years, he started getting  FAROOQ injections every 6 months. He reports the injections help tremendously, but he still has nagging pain.    Patient Stated Goal(s):  \"Start with a proper exercise program and extend the time between injections\"  Initial:     1/10 Post Session:     0/10  Past Medical History/Comorbidities:   Mr. Serena Pham  has a past medical history of Arthritis, CAD (coronary artery disease),

## 2023-05-30 NOTE — PROGRESS NOTES
Yue Wright  : 1950  Primary: Medicare Part A [place] B (Medicare)  [place] [employer] [place] [employer]  [address] [place]ena [place] [address]  Phone: [phone]  Fax: [phone] Plan Frequency: 1-2x/week x 90 days  Plan of Care/Certification Expiration Date: 23      >PT Visit Info:  Plan Frequency: 1-2x/week x 90 days  Plan of Care/Certification Expiration Date: 23  Total # of Visits to Date: 7      Visit Count:  7    OUTPATIENT PHYSICAL THERAPY:OP NOTE TYPE: Treatment Note 2023       Episode  }Appt Desk             Treatment Diagnosis:  Pain in Right Hip (M25.551)  Stiffness of Right Hip, Not elsewhere classified (M25.651)  Difficulty in walking, Not elsewhere classified (R26.2)  Generalized Muscle Weakness (M62.81)  Vertebrogenic Low Back Pain (M54.51)  Medical/Referring Diagnosis:  Lumbar radiculopathy [M54.16]  Spinal stenosis of lumbar region without neurogenic claudication [M48.061]  Referring Physician:  Sukhwinder Munoz MD MD Orders:  PT Eval and Treat   Date of Onset:  Onset Date: 18     Allergies:   Lisinopril, Losartan, and Statins  Restrictions/Precautions:  Restrictions/Precautions: None  No data recorded   Interventions Planned (Treatment may consist of any combination of the following):    Current Treatment Recommendations: Strengthening; Gait training; ROM; Stair training; Balance training; Neuromuscular re-education; Functional mobility training; Manual; Pain management; Home exercise program; Positioning; Modalities     >Subjective Comments:  Patient reports he's doing well, and feels ready for Dc and to go on his trip to [place] next week. >Initial:     1/10>Post Session:       010  Medications Last Reviewed:  2023  Updated Objective Findings:   Neutral pelvic and lumbar alignment today ;True min R leg length discrepancy due to R knee OA.    Treatment   THERAPEUTIC EXERCISE: (45 minutes):    Exercises per grid below to improve mobility, stand-by assist

## 2023-08-08 ENCOUNTER — APPOINTMENT (RX ONLY)
Dept: URBAN - METROPOLITAN AREA CLINIC 25 | Facility: CLINIC | Age: 73
Setting detail: DERMATOLOGY
End: 2023-08-08

## 2023-08-08 DIAGNOSIS — Z85.828 PERSONAL HISTORY OF OTHER MALIGNANT NEOPLASM OF SKIN: ICD-10-CM

## 2023-08-08 DIAGNOSIS — L82.1 OTHER SEBORRHEIC KERATOSIS: ICD-10-CM

## 2023-08-08 DIAGNOSIS — L57.0 ACTINIC KERATOSIS: ICD-10-CM

## 2023-08-08 DIAGNOSIS — Z87.2 PERSONAL HISTORY OF DISEASES OF THE SKIN AND SUBCUTANEOUS TISSUE: ICD-10-CM

## 2023-08-08 DIAGNOSIS — D22 MELANOCYTIC NEVI: ICD-10-CM

## 2023-08-08 DIAGNOSIS — L57.8 OTHER SKIN CHANGES DUE TO CHRONIC EXPOSURE TO NONIONIZING RADIATION: ICD-10-CM

## 2023-08-08 PROBLEM — D22.5 MELANOCYTIC NEVI OF TRUNK: Status: ACTIVE | Noted: 2023-08-08

## 2023-08-08 PROCEDURE — ? COUNSELING

## 2023-08-08 PROCEDURE — 17000 DESTRUCT PREMALG LESION: CPT

## 2023-08-08 PROCEDURE — 17003 DESTRUCT PREMALG LES 2-14: CPT

## 2023-08-08 PROCEDURE — ? LIQUID NITROGEN

## 2023-08-08 PROCEDURE — 99213 OFFICE O/P EST LOW 20 MIN: CPT | Mod: 25

## 2023-08-08 ASSESSMENT — LOCATION DETAILED DESCRIPTION DERM
LOCATION DETAILED: RIGHT DISTAL DORSAL FOREARM
LOCATION DETAILED: RIGHT CENTRAL ZYGOMA
LOCATION DETAILED: LEFT POSTERIOR SHOULDER
LOCATION DETAILED: EPIGASTRIC SKIN
LOCATION DETAILED: LEFT CENTRAL PARIETAL SCALP
LOCATION DETAILED: LEFT SUPERIOR UPPER BACK
LOCATION DETAILED: RIGHT MID-UPPER BACK
LOCATION DETAILED: LEFT DISTAL RADIAL DORSAL FOREARM
LOCATION DETAILED: LEFT DISTAL DORSAL FOREARM
LOCATION DETAILED: RIGHT INFERIOR UPPER BACK
LOCATION DETAILED: RIGHT CENTRAL PARIETAL SCALP
LOCATION DETAILED: PERIUMBILICAL SKIN
LOCATION DETAILED: LEFT CENTRAL MALAR CHEEK
LOCATION DETAILED: RIGHT SUPERIOR LATERAL NECK
LOCATION DETAILED: RIGHT FOREHEAD
LOCATION DETAILED: RIGHT MEDIAL INFERIOR CHEST
LOCATION DETAILED: LEFT CENTRAL FRONTAL SCALP
LOCATION DETAILED: NASAL DORSUM
LOCATION DETAILED: LEFT SUPERIOR OCCIPITAL SCALP
LOCATION DETAILED: RIGHT SUPERIOR MEDIAL FOREHEAD
LOCATION DETAILED: LEFT SUPERIOR PARIETAL SCALP

## 2023-08-08 ASSESSMENT — LOCATION ZONE DERM
LOCATION ZONE: FACE
LOCATION ZONE: NECK
LOCATION ZONE: NOSE
LOCATION ZONE: ARM
LOCATION ZONE: SCALP
LOCATION ZONE: TRUNK

## 2023-08-08 ASSESSMENT — LOCATION SIMPLE DESCRIPTION DERM
LOCATION SIMPLE: ABDOMEN
LOCATION SIMPLE: LEFT FOREARM
LOCATION SIMPLE: RIGHT FOREARM
LOCATION SIMPLE: SCALP
LOCATION SIMPLE: LEFT UPPER BACK
LOCATION SIMPLE: LEFT OCCIPITAL SCALP
LOCATION SIMPLE: NOSE
LOCATION SIMPLE: NECK
LOCATION SIMPLE: LEFT CHEEK
LOCATION SIMPLE: LEFT SCALP
LOCATION SIMPLE: RIGHT ZYGOMA
LOCATION SIMPLE: LEFT SHOULDER
LOCATION SIMPLE: CHEST
LOCATION SIMPLE: RIGHT FOREHEAD
LOCATION SIMPLE: RIGHT UPPER BACK

## 2023-08-08 NOTE — PROCEDURE: LIQUID NITROGEN
Post-Care Instructions: I reviewed with the patient in detail post-care instructions. Patient is to wear sunprotection, and avoid picking at any of the treated lesions. Pt may apply Vaseline to crusted or scabbing areas.
Show Applicator Variable?: Yes
Detail Level: Simple
Consent: The patient's consent was obtained including but not limited to risks of crusting, scabbing, blistering, scarring, darker or lighter pigmentary change, recurrence, incomplete removal and infection.
Render Post-Care Instructions In Note?: no
Duration Of Freeze Thaw-Cycle (Seconds): 0

## 2023-08-16 ENCOUNTER — OFFICE VISIT (OUTPATIENT)
Dept: ORTHOPEDIC SURGERY | Age: 73
End: 2023-08-16
Payer: MEDICARE

## 2023-08-16 DIAGNOSIS — M54.16 LUMBAR RADICULOPATHY: Primary | ICD-10-CM

## 2023-08-16 DIAGNOSIS — M48.061 SPINAL STENOSIS OF LUMBAR REGION WITHOUT NEUROGENIC CLAUDICATION: ICD-10-CM

## 2023-08-16 PROCEDURE — 1036F TOBACCO NON-USER: CPT | Performed by: PHYSICAL MEDICINE & REHABILITATION

## 2023-08-16 PROCEDURE — G8427 DOCREV CUR MEDS BY ELIG CLIN: HCPCS | Performed by: PHYSICAL MEDICINE & REHABILITATION

## 2023-08-16 PROCEDURE — 1123F ACP DISCUSS/DSCN MKR DOCD: CPT | Performed by: PHYSICAL MEDICINE & REHABILITATION

## 2023-08-16 PROCEDURE — 3017F COLORECTAL CA SCREEN DOC REV: CPT | Performed by: PHYSICAL MEDICINE & REHABILITATION

## 2023-08-16 PROCEDURE — G8417 CALC BMI ABV UP PARAM F/U: HCPCS | Performed by: PHYSICAL MEDICINE & REHABILITATION

## 2023-08-16 PROCEDURE — 99213 OFFICE O/P EST LOW 20 MIN: CPT | Performed by: PHYSICAL MEDICINE & REHABILITATION

## 2023-08-16 NOTE — PROGRESS NOTES
Date:  08/16/23     HPI:  I am seeing Chasidy Jasso in evalution/folowup. I last saw him in the office setting a year ago. In 1989 he had a lumbar disc herniation and required surgical intervention. The pain typically is in the right lower lumbar paraspinal area and buttock, worse with going from sitting to standing or prolonged resting. Normally if he is moving around he does better. He has no neurologic issues in the lower extremities and history does not generate a history of a progressive gait disturbance. MR imaging of the lumbar spine from 2019 revealed moderate central spinal stenosis at the L4-L5 level. His track record with translaminar epidural steroid injections is good. He has had about 4 of them, most recently in late April. He has a very significant pain reduction and can go for months or longer. Right now he does not feel he needs any type of reintervention. ROS: No new problems. PE: Alert and cooperative. Good strength in the lower extremities. No lateralizing sensory findings. No significant lumbar spine tenderness. Good range of motion of the hips. Assessment/Plan:       PREDOMINANTLY MUSCULOSKELETAL LUMBOSACRAL  SPINE PAIN. In the context of significant lumbar spinal stenosis, he is doing very well. We do not need to reimage. Translaminar epidural steroid injections have worked very well and I recommend just repeating those as needed. If symptoms change to warrant reevaluation, we will do that. I will otherwise see him back in a year for continuity of care.       Alex Abad MD

## 2023-09-27 ENCOUNTER — OFFICE VISIT (OUTPATIENT)
Age: 73
End: 2023-09-27
Payer: MEDICARE

## 2023-09-27 VITALS
SYSTOLIC BLOOD PRESSURE: 134 MMHG | WEIGHT: 218 LBS | HEIGHT: 73 IN | HEART RATE: 72 BPM | BODY MASS INDEX: 28.89 KG/M2 | DIASTOLIC BLOOD PRESSURE: 74 MMHG

## 2023-09-27 DIAGNOSIS — E78.00 PURE HYPERCHOLESTEROLEMIA: ICD-10-CM

## 2023-09-27 DIAGNOSIS — I25.118 CORONARY ARTERY DISEASE OF NATIVE ARTERY OF NATIVE HEART WITH STABLE ANGINA PECTORIS (HCC): Primary | ICD-10-CM

## 2023-09-27 DIAGNOSIS — I10 PRIMARY HYPERTENSION: ICD-10-CM

## 2023-09-27 DIAGNOSIS — I34.0 NONRHEUMATIC MITRAL VALVE REGURGITATION: ICD-10-CM

## 2023-09-27 PROCEDURE — 1123F ACP DISCUSS/DSCN MKR DOCD: CPT | Performed by: INTERNAL MEDICINE

## 2023-09-27 PROCEDURE — 1036F TOBACCO NON-USER: CPT | Performed by: INTERNAL MEDICINE

## 2023-09-27 PROCEDURE — G8417 CALC BMI ABV UP PARAM F/U: HCPCS | Performed by: INTERNAL MEDICINE

## 2023-09-27 PROCEDURE — 3075F SYST BP GE 130 - 139MM HG: CPT | Performed by: INTERNAL MEDICINE

## 2023-09-27 PROCEDURE — 3017F COLORECTAL CA SCREEN DOC REV: CPT | Performed by: INTERNAL MEDICINE

## 2023-09-27 PROCEDURE — G8428 CUR MEDS NOT DOCUMENT: HCPCS | Performed by: INTERNAL MEDICINE

## 2023-09-27 PROCEDURE — 3078F DIAST BP <80 MM HG: CPT | Performed by: INTERNAL MEDICINE

## 2023-09-27 PROCEDURE — 99214 OFFICE O/P EST MOD 30 MIN: CPT | Performed by: INTERNAL MEDICINE

## 2023-09-27 RX ORDER — ROSUVASTATIN CALCIUM 5 MG/1
5 TABLET, COATED ORAL DAILY
Qty: 30 TABLET | Refills: 11 | Status: SHIPPED | OUTPATIENT
Start: 2023-09-27

## 2023-09-27 RX ORDER — AMLODIPINE BESYLATE 5 MG/1
5 TABLET ORAL DAILY
Qty: 90 TABLET | Refills: 3 | Status: SHIPPED | OUTPATIENT
Start: 2023-09-27

## 2023-09-27 RX ORDER — NITROGLYCERIN 0.4 MG/1
0.4 TABLET SUBLINGUAL EVERY 5 MIN PRN
Qty: 25 TABLET | Refills: 1 | Status: SHIPPED | OUTPATIENT
Start: 2023-09-27

## 2023-09-27 ASSESSMENT — ENCOUNTER SYMPTOMS
COUGH: 0
SHORTNESS OF BREATH: 0
ABDOMINAL PAIN: 0
BACK PAIN: 0

## 2023-09-27 NOTE — PROGRESS NOTES
(NITROSTAT) 0.4 MG SL tablet    rosuvastatin (CRESTOR) 5 MG tablet       Other    Hyperlipidemia    Relevant Medications    amLODIPine (NORVASC) 5 MG tablet    nitroGLYCERIN (NITROSTAT) 0.4 MG SL tablet    rosuvastatin (CRESTOR) 5 MG tablet     Medications Discontinued During This Encounter   Medication Reason    nitroGLYCERIN (NITROSTAT) 0.4 MG SL tablet REORDER    amLODIPine (NORVASC) 5 MG tablet REORDER             Patient has been instructed and agrees to call our office with any issues or other concerns related to their cardiac condition(s) and/or complaint(s). Return in about 6 months (around 3/27/2024).        Qasim Fortune MD  9/27/2023

## 2023-10-30 ENCOUNTER — TELEPHONE (OUTPATIENT)
Dept: ORTHOPEDIC SURGERY | Age: 73
End: 2023-10-30

## 2023-10-31 ENCOUNTER — OFFICE VISIT (OUTPATIENT)
Dept: ORTHOPEDIC SURGERY | Age: 73
End: 2023-10-31
Payer: MEDICARE

## 2023-10-31 DIAGNOSIS — M54.16 LUMBAR RADICULOPATHY: Primary | ICD-10-CM

## 2023-10-31 PROCEDURE — 62323 NJX INTERLAMINAR LMBR/SAC: CPT | Performed by: PHYSICAL MEDICINE & REHABILITATION

## 2023-10-31 RX ORDER — TRIAMCINOLONE ACETONIDE 40 MG/ML
100 INJECTION, SUSPENSION INTRA-ARTICULAR; INTRAMUSCULAR ONCE
Status: COMPLETED | OUTPATIENT
Start: 2023-10-31 | End: 2023-10-31

## 2023-10-31 RX ADMIN — TRIAMCINOLONE ACETONIDE 100 MG: 40 INJECTION, SUSPENSION INTRA-ARTICULAR; INTRAMUSCULAR at 11:06

## 2023-10-31 NOTE — PROGRESS NOTES
Date: 10/31/23   Name: Hilario Verma    Pre-Procedural Diagnosis:    Diagnosis Orders   1. Lumbar radiculopathy  XR INJ SPINE THER SUBST LUM/SAC W IMG    triamcinolone acetonide (KENALOG-40) injection 100 mg          Procedure: Lumbar Epidural Steroid Injection (FAROOQ)    Precautions: [facility] Precautions spine injections: None. Patient denies any prior sensitivity to steroid, local anesthetic, contrast dye, iodine or shellfish. The procedure was discussed at length with the patient and informed consent was signed. The patient was placed in a prone position on the fluoroscopy table and the skin was prepped and draped in a routine sterile fashion. The area to be injected was anesthetized with approximately 5 cc of 1% Lidocaine. Initially a 22-gauge 3.5 inch spinal needle was carefully advanced under fluoroscopic guidance to the left L4-L5 interlaminar epidural space. At this time 0.25 cc of omnipaque administered. . Once proper placement was confirmed, 1.5 cc of sterile water and 100 mg of Kenalog were injected through the spinal needle. Fluoroscopic guidance was used intermittently over a 10-minute period to insure proper needle placement and patient safety. A hard copy of the fluoroscopic  images has been placed in the patient's chart. The patient was monitored after the procedure and discharged home without complication. A total of 2.5 cc of Kenalog were administered during this procedure.     Resume Meds:  Pt remains on asa 81 mg.    José Manuel Staton MD  10/31/23

## 2024-01-26 NOTE — PROGRESS NOTES
Name: David Macedo  YOB: 1950  Gender: male  MRN: [id number]      CC: Shoulder Pain (R)       HPI: David Macedo is a 73 y.o. male who presents with right shoulder pain.         ROS/Meds/PSH/PMH/FH/SH: I personally reviewed the patients standard intake form.  Below are the pertinents    Tobacco:  reports that he quit smoking about 42 years ago. His smoking use included cigarettes. He has never used smokeless tobacco.  Diabetes: {st diabetes control:47850}  Other: ***    Physical Examination:  General: no acute distress  Lungs: breathing easily  CV: regular rhythm by pulse  {body part:97388}:***      Imaging:   ***  All imaging interpreted by myself Sofie Brooks PA-C independent of radiology review        Assessment:   No diagnosis found.    Plan:   ***              Sofie Brooks PA-C  Orthopaedics and Sports Medicine

## 2024-01-29 ENCOUNTER — OFFICE VISIT (OUTPATIENT)
Dept: ORTHOPEDIC SURGERY | Age: 74
End: 2024-01-29
Payer: MEDICARE

## 2024-01-29 VITALS — HEIGHT: 73 IN | WEIGHT: 210 LBS | BODY MASS INDEX: 27.83 KG/M2

## 2024-01-29 DIAGNOSIS — M25.511 RIGHT SHOULDER PAIN, UNSPECIFIED CHRONICITY: ICD-10-CM

## 2024-01-29 DIAGNOSIS — M75.21 BICEPS TENDINITIS OF RIGHT SHOULDER: Primary | ICD-10-CM

## 2024-01-29 PROCEDURE — G8484 FLU IMMUNIZE NO ADMIN: HCPCS | Performed by: ORTHOPAEDIC SURGERY

## 2024-01-29 PROCEDURE — 1123F ACP DISCUSS/DSCN MKR DOCD: CPT | Performed by: ORTHOPAEDIC SURGERY

## 2024-01-29 PROCEDURE — G8417 CALC BMI ABV UP PARAM F/U: HCPCS | Performed by: ORTHOPAEDIC SURGERY

## 2024-01-29 PROCEDURE — 1036F TOBACCO NON-USER: CPT | Performed by: ORTHOPAEDIC SURGERY

## 2024-01-29 PROCEDURE — 99204 OFFICE O/P NEW MOD 45 MIN: CPT | Performed by: ORTHOPAEDIC SURGERY

## 2024-01-29 PROCEDURE — 3017F COLORECTAL CA SCREEN DOC REV: CPT | Performed by: ORTHOPAEDIC SURGERY

## 2024-01-29 PROCEDURE — G8427 DOCREV CUR MEDS BY ELIG CLIN: HCPCS | Performed by: ORTHOPAEDIC SURGERY

## 2024-01-29 RX ORDER — TRIAMCINOLONE ACETONIDE 40 MG/ML
40 INJECTION, SUSPENSION INTRA-ARTICULAR; INTRAMUSCULAR ONCE
Status: COMPLETED | OUTPATIENT
Start: 2024-01-29 | End: 2024-01-29

## 2024-01-29 RX ADMIN — TRIAMCINOLONE ACETONIDE 40 MG: 40 INJECTION, SUSPENSION INTRA-ARTICULAR; INTRAMUSCULAR at 11:32

## 2024-01-29 NOTE — PROGRESS NOTES
Name: David Macedo  YOB: 1950  Gender: male  MRN: [id number]      CC: Shoulder Pain (R)       HPI: David Macedo is a 73 y.o. male who presents with Shoulder Pain (R)  He has pain in the front of the right shoulder with no specific injury. He has pretty good range of motion but he has really sharp with certain movements. He has had no formal treatment.  Pain with overhead activity as well as reaching behind his back.        ROS/Meds/PSH/PMH/FH/SH: I personally reviewed the patients standard intake form.  Below are the pertinents    Tobacco:  reports that he quit smoking about 42 years ago. His smoking use included cigarettes. He has never used smokeless tobacco.  Diabetes: none  Other: Coronary artery disease status post stenting several years ago.    Physical Examination:  General: no acute distress  Lungs: breathing easily  CV: regular rhythm by pulse  Right Shoulder: Tenderness palpation anteriorly over the long head of the biceps.  Mild positive Lynne and Neer impingement signs.  Mild discomfort with LaPorte's and speeds and O'Aracelis's.  5 out of 5 rotator cuff strength.      Imaging:   Shoulder XR: Grashey, Axillary and Scapula Y views     Clinical Indication:  1. Biceps tendinitis of right shoulder    2. Right shoulder pain, unspecified chronicity           Report: Grashey, Axillary and Scapula Y XRs of the Right shoulder demonstrates mild degenerative changes type II acromion no acute fracture or dislocation    Impression: No acute findings age-appropriate degenerative changes as above        All imaging interpreted by myself Rajat Welsh MD independent of radiology review        Assessment:     ICD-10-CM    1. Biceps tendinitis of right shoulder  M75.21       2. Right shoulder pain, unspecified chronicity  M25.511 XR SHOULDER RIGHT (MIN 2 VIEWS)          Plan:   History and exam consistent mostly with biceps tendinitis.  I think he likely has component of rotator cuff tendinitis

## 2024-02-02 NOTE — THERAPY EVALUATION
ABD:135deg; ER in N:63deg; ER in ABD:63deg; and IR:T11 to improve pt's ability to reach behind back, overhead and perform ADLs such as dressing without modifications.  Pt will be able to sleep with decreased frequency (1x/night) of waking due to R shoulder pain.  Pt will increase MMT to 4/5 throughout R shoulder and 4 to 4+/5 for elbow in order to perform house hold and work related tasks with greater ease.    Discharge Goals: Time Frame: 8-12 weeks (5-6-24)  Pt will improve Quick Dash score to 10 % disability to increase pt's overall functional mobility.  Pt will subjectively report a decrease in pain to 2-3/10 @ worst to allow pt greater ease with reaching overhead.   Pt will no longer wake due to R shoulder pain.  Pt will demonstrate AROM of R shoulder that is within 10-15deg of the L and does not limit function.   Pt will increase MMT to 4+/5 throughout R shoulder to allow pt to return to PLOF with min c/o R shoulder pain.  Pt will be discharged from PT to [facility].           Outcome Measure:   Tool Used: Disabilities of the Arm, Shoulder and Hand (DASH) Questionnaire - Quick Version  Score:  Initial: 17/55 =14% disability Most Recent: X/55 (Date: -- )   Interpretation of Score: The DASH is designed to measure the activities of daily living in person's with upper extremity dysfunction or pain.  Each section is scored on a 1-5 scale, 5 representing the greatest disability.  The scores of each section are added together for a total score of 55.      Medical Necessity:   > Patient is expected to demonstrate progress in strength, range of motion, coordination, and functional technique to improve functional mobility while decreasing pain.  Reason For Services/Other Comments:  > Patient continues to require skilled intervention due to deficits mentioned above.      Regarding David Macedo's therapy, I certify that the treatment plan above will be carried out by a therapist or under their direction.  Thank you for this

## 2024-02-02 NOTE — PROGRESS NOTES
David Macedo  : 1950  Primary: Medicare Part A [place] (Medicare)  [place]: [place] [facility]  [address]  [address]  [facility] [address]  Phone: [phone]  Fax: [phone] Plan Frequency: 1-2x/week x 90 days  Plan of Care/Certification Expiration Date: 24        Plan of Care/Certification Expiration Date:  Plan of Care/Certification Expiration Date: 24    Frequency/Duration: Plan Frequency: 1-2x/week x 90 days      Time In/Out:   Time In: 1115  Time Out: 1205      PT Visit Info:    Total # of Visits to Date: 1      Visit Count:  1    OUTPATIENT PHYSICAL THERAPY:   Treatment Note 2024       Episode  (R shoulder pain)               Treatment Diagnosis:    Chronic right shoulder pain  Muscle weakness (generalized)  Postural kyphosis, cervicothoracic region  Medical/Referring Diagnosis:    Right shoulder pain, unspecified chronicity  Biceps tendinitis of right shoulder      Referring Physician:  Rajat Welsh MD MD Orders:  PT Eval and Treat   Return MD Appt:  3/25/24   Date of Onset:  Onset Date: 24     Allergies:   Lisinopril, Losartan, and Statins  Restrictions/Precautions:   None      Interventions Planned (Treatment may consist of any combination of the following):     See Assessment Note    Subjective Comments:   Patient reports sharp R shoulder pain intermittently, especially with reaching across his body.   Initial Pain Level:: Right Shoulder 4/10  Post Session Pain Level:  Right  Shoulder 3/10  Medications Last Reviewed:  2024  Updated Objective Findings:  See Evaluation Note from today  Treatment   THERAPEUTIC EXERCISE: (25 minutes):    Exercises per grid below to improve mobility, strength, and coordination.  Required moderate visual, verbal, and tactile cues to promote proper body alignment, promote proper body posture, promote proper body mechanics, and promote proper body breathing techniques.  Progressed resistance, range, repetitions, and

## 2024-02-06 ENCOUNTER — HOSPITAL ENCOUNTER (OUTPATIENT)
Dept: PHYSICAL THERAPY | Age: 74
Setting detail: RECURRING SERIES
Discharge: HOME | End: 2024-02-09
Attending: ORTHOPAEDIC SURGERY
Payer: MEDICARE

## 2024-02-06 DIAGNOSIS — M62.81 MUSCLE WEAKNESS (GENERALIZED): ICD-10-CM

## 2024-02-06 DIAGNOSIS — M40.03 POSTURAL KYPHOSIS, CERVICOTHORACIC REGION: ICD-10-CM

## 2024-02-06 DIAGNOSIS — G89.29 CHRONIC RIGHT SHOULDER PAIN: Primary | ICD-10-CM

## 2024-02-06 DIAGNOSIS — M25.511 CHRONIC RIGHT SHOULDER PAIN: Primary | ICD-10-CM

## 2024-02-06 PROCEDURE — 97161 PT EVAL LOW COMPLEX 20 MIN: CPT

## 2024-02-06 PROCEDURE — 97110 THERAPEUTIC EXERCISES: CPT

## 2024-02-06 ASSESSMENT — PAIN DESCRIPTION - LOCATION: LOCATION: SHOULDER

## 2024-02-06 ASSESSMENT — PAIN DESCRIPTION - PAIN TYPE: TYPE: CHRONIC PAIN

## 2024-02-06 ASSESSMENT — PAIN DESCRIPTION - ORIENTATION: ORIENTATION: RIGHT

## 2024-02-06 ASSESSMENT — PAIN SCALES - GENERAL: PAINLEVEL_OUTOF10: 4

## 2024-02-08 NOTE — PROGRESS NOTES
Shoulder Internal Rotation Stretch with Towel  - 1-2 x daily - 7 x weekly - 1-2 sets - 10 reps  Treatment/Session Summary:    Treatment Assessment:   Patient did well with all strengthening above with no additional c/o pain or compromising form. He required verbal cues to maintain good form and avoid use of momentum.Patient is progressing  nicely toward current goals.   Communication/Consultation:  Therapy Evaluation sent to referring provider  Equipment provided today:  HEP w/ instruction sheet and kinesiotape  Recommendations/Intent for next treatment session: Next visit will focus on advancements to more challenging postural stretching, strengthening and ROM exercises to improve pt's overall functional mobility.    >Total Treatment Billable Duration:  45 minutes therex  Time In: 1015  Time Out: 1100    Cinthya Peck PT         [facility]  [facility]  Appt Desk     Future Appointments   Date Time Provider Department Center   2/16/2024 10:15 AM Cinthya Peck, PT [facility]   2/20/2024  2:30 PM Cinthya Peck, PT [facility]   2/27/2024 10:30 AM Cinthya Peck, PT [facility]   3/25/2024 10:30 AM Rajat Welsh MD [facility]   3/27/2024  9:45 AM Franck Cordova MD [facility]   8/20/2024  3:00 PM GM Lubin Jr., MD [facility]

## 2024-02-09 ENCOUNTER — HOSPITAL ENCOUNTER (OUTPATIENT)
Dept: PHYSICAL THERAPY | Age: 74
Setting detail: RECURRING SERIES
Discharge: HOME | End: 2024-02-12
Attending: ORTHOPAEDIC SURGERY
Payer: MEDICARE

## 2024-02-09 PROCEDURE — 97110 THERAPEUTIC EXERCISES: CPT

## 2024-02-15 NOTE — PROGRESS NOTES
David Macedo  : 1950  Primary: Medicare Part A [place] B (Medicare)  [place]: [place] [facility]  [address]  [address]  [facility] [address]  Phone: [phone]  Fax: [phone] Plan Frequency: 1-2x/week x 90 days  Plan of Care/Certification Expiration Date: 24        Plan of Care/Certification Expiration Date:  Plan of Care/Certification Expiration Date: 24    Frequency/Duration: Plan Frequency: 1-2x/week x 90 days      Time In/Out:   Time In: 1020  Time Out: 1105      PT Visit Info:    Total # of Visits to Date: 3      Visit Count:  3    OUTPATIENT PHYSICAL THERAPY:   Treatment Note 2024       Episode  (R shoulder pain)               Treatment Diagnosis:    Chronic right shoulder pain  Muscle weakness (generalized)  Postural kyphosis, cervicothoracic region  Medical/Referring Diagnosis:    Right shoulder pain, unspecified chronicity  Biceps tendinitis of right shoulder      Referring Physician:  Rajat Welsh MD MD Orders:  PT Eval and Treat   Return MD Appt:  3/25/24   Date of Onset:  Onset Date: 24     Allergies:   Lisinopril, Losartan, and Statins  Restrictions/Precautions:   None      Interventions Planned (Treatment may consist of any combination of the following):     See Assessment Note    Subjective Comments:   Patient reports overall improved R shoulder pain and discomfort. He reports more fatigue today from cleaning/changing a dryer vent.   Initial Pain Level:: Right Shoulder 1/10  Post Session Pain Level:  Right  Shoulder 1/10  Medications Last Reviewed:  2024  Updated Objective Findings:  None Today  Treatment   THERAPEUTIC EXERCISE: (45 minutes):    Exercises per grid below to improve mobility, strength, and coordination.  Required moderate visual, verbal, and tactile cues to promote proper body alignment, promote proper body posture, promote proper body mechanics, and promote proper body breathing techniques.  Progressed resistance, range,

## 2024-02-16 ENCOUNTER — HOSPITAL ENCOUNTER (OUTPATIENT)
Dept: PHYSICAL THERAPY | Age: 74
Setting detail: RECURRING SERIES
Discharge: HOME | End: 2024-02-19
Attending: ORTHOPAEDIC SURGERY
Payer: MEDICARE

## 2024-02-16 PROCEDURE — 97110 THERAPEUTIC EXERCISES: CPT

## 2024-02-16 ASSESSMENT — PAIN SCALES - GENERAL: PAINLEVEL_OUTOF10: 1

## 2024-02-16 ASSESSMENT — PAIN DESCRIPTION - LOCATION: LOCATION: SHOULDER

## 2024-02-16 ASSESSMENT — PAIN DESCRIPTION - ORIENTATION: ORIENTATION: RIGHT

## 2024-02-16 ASSESSMENT — PAIN DESCRIPTION - PAIN TYPE: TYPE: CHRONIC PAIN

## 2024-02-16 ASSESSMENT — PAIN DESCRIPTION - DESCRIPTORS: DESCRIPTORS: ACHING;TIGHTNESS;SORE

## 2024-02-16 NOTE — PROGRESS NOTES
David Macedo  : 1950  Primary: Medicare Part A [place] B (Medicare)  [place] [facility]  [address]  [address]  [facility] [address]  Phone: [phone]  Fax: [phone] Plan Frequency: 1-2x/week x 90 days  Plan of Care/Certification Expiration Date: 24        Plan of Care/Certification Expiration Date:  Plan of Care/Certification Expiration Date: 24    Frequency/Duration: Plan Frequency: 1-2x/week x 90 days      Time In/Out:   Time In: 1430  Time Out: 1515      PT Visit Info:    Total # of Visits to Date: 4      Visit Count:  4    OUTPATIENT PHYSICAL THERAPY:   Treatment Note 2024       Episode  (R shoulder pain)               Treatment Diagnosis:    Chronic right shoulder pain  Muscle weakness (generalized)  Postural kyphosis, cervicothoracic region  Medical/Referring Diagnosis:    Right shoulder pain, unspecified chronicity  Biceps tendinitis of right shoulder      Referring Physician:  Rajat Welsh MD MD Orders:  PT Eval and Treat   Return MD Appt:  3/25/24   Date of Onset:  Onset Date: 24     Allergies:   Lisinopril, Losartan, and Statins  Restrictions/Precautions:   None      Interventions Planned (Treatment may consist of any combination of the following):     See Assessment Note    Subjective Comments:   Patient reports overall R shoulder pain and strength continues to improve.   Initial Pain Level::     10  Post Session Pain Level:       1/10  Medications Last Reviewed:  2024  Updated Objective Findings:  None Today  Treatment   THERAPEUTIC EXERCISE: (45 minutes):    Exercises per grid below to improve mobility, strength, and coordination.  Required moderate visual, verbal, and tactile cues to promote proper body alignment, promote proper body posture, promote proper body mechanics, and promote proper body breathing techniques.  Progressed resistance, range, repetitions, and complexity of movement as indicated.  MANUAL THERAPY: (0 minutes):

## 2024-02-20 ENCOUNTER — HOSPITAL ENCOUNTER (OUTPATIENT)
Dept: PHYSICAL THERAPY | Age: 74
Setting detail: RECURRING SERIES
Discharge: HOME | End: 2024-02-23
Attending: ORTHOPAEDIC SURGERY
Payer: MEDICARE

## 2024-02-20 PROCEDURE — 97110 THERAPEUTIC EXERCISES: CPT

## 2024-02-20 ASSESSMENT — PAIN SCALES - GENERAL: PAINLEVEL_OUTOF10: 1

## 2024-02-22 NOTE — PROGRESS NOTES
movement as indicated.  MANUAL THERAPY: (0 minutes):   Joint mobilization and Soft tissue mobilization was utilized and necessary because of the patient's restricted joint motion and restricted motion of soft tissue.   MODALITIES:       *  Cold Pack Therapy in order to provide analgesia and reduce inflammation and edema.       *  Hot Pack Therapy in order to provide analgesia and relieve muscle spasm.    Date:  2.20.24 Date:  2.27.24   Activity/Exercise     Kinesiotaping to unload R shoulder     Posterior Shoulder Rolls 4#; 20x    Shoulder shrugs  4#; 20x    Scapular retraction     Cervical retraction     Wall slides into flex     Wall slides into scapt     Wall Slides into ABD     Stdg IR w/ towel stretch     Stdg pulleys into flex     Seated pulleys into flex/xscapt     Seated pulleys into ABD     Stdg pulleys into IR     Stdg doorway pec stretch     B shoulder ext ( palms forw/palms back) GTB; 20x each GTB; 20x each   B shoulder horiz abd GTB; 20x GTB; 20x   B shoulder horiz scap retraction GTB; 20x GTB; 20x   B shoulder rows GTB; 20x GTB; 20x   B shoulder ER GTB; 20x  GTB; 20x   Supine press ups 6#; 20x 6#; 20x   Supine flex AROM 3#; 20x 4#; 20x   Supine serratus punches 6#; 20x 6#; 20x   S/L ABD AROM 3#; 20x 3#; 20x   S/L ER AROM 3#; 20x 3#; 20x   Stdg shoulder press 4#; 15x 4#; 20x each;( neutral and abd)   Stdg shoulder ABD to 90 4#; 10x; 3#; 10x 4#; 15x; 3#; 15x   UBE Res 5; 8min (4/4)  Stepper; Res 3; 10min     Access Code: [email or web address]  URL: [email or web address]  Date: 02/06/2024  Prepared by: Cinthya Peck    Exercises  - Standing Backward Shoulder Rolls  - 1-2 x daily - 7 x weekly - 1-2 sets - 10 reps  - Standing Scapular Retraction  - 1-2 x daily - 7 x weekly - 1-2 sets - 10 reps  - Standing Cervical Retraction  - 1-2 x daily - 7 x weekly - 1-2 sets - 10 reps  - Shoulder Flexion Wall Slide with Towel  - 1-2 x daily - 7 x weekly - 1-2 sets - 10 reps  - Scaption Wall Slide with Towel  - 1-2 x

## 2024-02-27 ENCOUNTER — HOSPITAL ENCOUNTER (OUTPATIENT)
Dept: PHYSICAL THERAPY | Age: 74
Setting detail: RECURRING SERIES
Discharge: HOME | End: 2024-03-01
Attending: ORTHOPAEDIC SURGERY
Payer: MEDICARE

## 2024-02-27 PROCEDURE — 97110 THERAPEUTIC EXERCISES: CPT

## 2024-02-27 ASSESSMENT — PAIN SCALES - GENERAL
PAINLEVEL_OUTOF10: 1
PAINLEVEL_OUTOF10: 1

## 2024-02-27 ASSESSMENT — PAIN DESCRIPTION - LOCATION
LOCATION: SHOULDER
LOCATION: SHOULDER

## 2024-02-27 ASSESSMENT — PAIN DESCRIPTION - ORIENTATION
ORIENTATION: RIGHT
ORIENTATION: RIGHT

## 2024-02-27 ASSESSMENT — PAIN DESCRIPTION - PAIN TYPE
TYPE: CHRONIC PAIN
TYPE: CHRONIC PAIN

## 2024-02-27 ASSESSMENT — PAIN DESCRIPTION - DESCRIPTORS
DESCRIPTORS: SORE;ACHING;TIGHTNESS
DESCRIPTORS: ACHING;SORE;TIGHTNESS

## 2024-03-05 NOTE — PROGRESS NOTES
1-2 sets - 10 reps  - Scaption Wall Slide with Towel  - 1-2 x daily - 7 x weekly - 1-2 sets - 10 reps  - Standing Shoulder Abduction Slides at Wall  - 1-2 x daily - 7 x weekly - 1-2 sets - 10 reps  - Standing Shoulder Internal Rotation Stretch with Towel  - 1-2 x daily - 7 x weekly - 1-2 sets - 10 reps  Treatment/Session Summary:    Treatment Assessment: Patient continues to do well with R RC/scapular strengthening with increased weight in gravity resisted positions. He is compliant and independent with his HEP. Please see DC summary for goals met and progress made.   .   Communication/Consultation:   Patient encouraged to replace footwear to aide with back and knee pain.  Equipment provided today:  None   Recommendations: Patient is Dc'd from PT to HEP at this time.     >Total Treatment Billable Duration:  38 min therex; see DC summary  Time In: 0850  Time Out: 0928    Cinthya Peck PT         Charge Capture  [employer] Portal  Appt Desk     Future Appointments   Date Time Provider Department Center   3/25/2024 10:30 AM Rajat Welsh MD [facility] AM[facility]   3/27/2024  9:45 AM Franck Cordova MD [facility]   8/20/2024  3:00 PM GM Lubin Jr., MD [facility]

## 2024-03-08 ENCOUNTER — HOSPITAL ENCOUNTER (OUTPATIENT)
Dept: PHYSICAL THERAPY | Age: 74
Setting detail: RECURRING SERIES
Discharge: HOME | End: 2024-03-11
Attending: ORTHOPAEDIC SURGERY
Payer: MEDICARE

## 2024-03-08 PROCEDURE — 97110 THERAPEUTIC EXERCISES: CPT

## 2024-03-08 ASSESSMENT — PAIN DESCRIPTION - DESCRIPTORS: DESCRIPTORS: ACHING;SORE;TIGHTNESS

## 2024-03-08 ASSESSMENT — PAIN DESCRIPTION - LOCATION: LOCATION: SHOULDER

## 2024-03-08 ASSESSMENT — PAIN DESCRIPTION - ORIENTATION: ORIENTATION: RIGHT

## 2024-03-08 ASSESSMENT — PAIN DESCRIPTION - PAIN TYPE: TYPE: CHRONIC PAIN

## 2024-03-08 ASSESSMENT — PAIN SCALES - GENERAL: PAINLEVEL_OUTOF10: 0

## 2024-03-08 NOTE — THERAPY DISCHARGE
Hyperlipidemia, Hypertension, Mitral valve regurgitation, NSTEMI (non-ST elevated myocardial infarction) (HCC), Post PTCA, Status post left knee replacement, Varicose veins of both lower extremities, and Varicose veins of legs.  Mr. Macedo  has a past surgical history that includes Colonoscopy; other surgical history; lumbar discectomy (1988); other surgical history (2003); Knee arthroscopy (Left, 2001); and pr unlisted procedure cardiac surgery (2012).  Social History/Living Environment:   Patient lives with their spouse  Type of Home: House: One Story  Level of Assistance: Rehab: Independent  Assistive Device: Devices: None  Additional Equipment: no equipment    Prior Level of Function/Work/Activity:   Prior Level of Function: Independent   Current Level of Function: Independent   Employment Status: Retired  Occupation: Law  Enforcement      Learning:   Does the patient/guardian have any barriers to learning?: No barriers  Will there be a co-learner?: No  What is the preferred language of the patient/guardian?: English  Is an  required?: No  How does the patient/guardian prefer to learn new concepts?: Listening; Reading; Demonstration; Pictures/Videos    Fall Risk Scale:   [place] Total Score: 0    Dominant Side:  right handed      OBJECTIVE   Objective:  Seated AROM:    Shoulder AROM  DATE  2.6.24 DATE  3.7.24   Flexion R: 138deg  L: WFL R: 168deg  L: WFL   Scaption R:148deg  L:WFL R:168deg  L:WFL   ABD R: 125deg  L: WFL R: 150deg  L: WFL   EXT R:35deg  L:WFL R: 50deg  L: WFL   ER (N / 90 ABD) R: 58deg/55deg  L: WFL R: 75deg/83deg  L: WFL   IR R: T12  L: WFL R: T10  L: T8   Flexibility PC Min tightness B WFL     Strength Date:  2.6.24 Date:  3.7.24 Date:     Shoulder  Parameters Parameters   Scapular Control (Ecc lowering) R:yes  L:yes Yes B    Shoulder Flex/Scapt R:4-/5  L:4-/5 4/5 B    Shoulder ABD R:3+/5  L:3+/5 4/5 B    Shoulder ER R:3/5  L:3+/5 4/5 B    Shoulder IR R:3/5  L:3+/5 4/5 B    Elbow Flex

## 2024-03-25 ENCOUNTER — OFFICE VISIT (OUTPATIENT)
Dept: ORTHOPEDIC SURGERY | Age: 74
End: 2024-03-25
Payer: MEDICARE

## 2024-03-25 DIAGNOSIS — M75.21 BICEPS TENDINITIS OF RIGHT SHOULDER: Primary | ICD-10-CM

## 2024-03-25 PROCEDURE — 3017F COLORECTAL CA SCREEN DOC REV: CPT | Performed by: ORTHOPAEDIC SURGERY

## 2024-03-25 PROCEDURE — 1123F ACP DISCUSS/DSCN MKR DOCD: CPT | Performed by: ORTHOPAEDIC SURGERY

## 2024-03-25 PROCEDURE — 99213 OFFICE O/P EST LOW 20 MIN: CPT | Performed by: ORTHOPAEDIC SURGERY

## 2024-03-25 PROCEDURE — G8484 FLU IMMUNIZE NO ADMIN: HCPCS | Performed by: ORTHOPAEDIC SURGERY

## 2024-03-25 PROCEDURE — G8417 CALC BMI ABV UP PARAM F/U: HCPCS | Performed by: ORTHOPAEDIC SURGERY

## 2024-03-25 PROCEDURE — G8428 CUR MEDS NOT DOCUMENT: HCPCS | Performed by: ORTHOPAEDIC SURGERY

## 2024-03-25 PROCEDURE — 1036F TOBACCO NON-USER: CPT | Performed by: ORTHOPAEDIC SURGERY

## 2024-03-25 NOTE — PROGRESS NOTES
Name: David Macedo  YOB: 1950  Gender: male  MRN: [id number]      CC: Shoulder Pain (R)       HPI: David Macedo is a 73 y.o. male who returns for follow up on  his right shoulder.         Physical Examination:  General: no acute distress  Lungs: breathing easily  CV: regular rhythm by pulse  {body part:77107}:***        Assessment:   No diagnosis found.    Plan:   ***            Rajat Welsh MD, FAAOS  Orthopaedics and Sports Medicine

## 2024-03-27 ENCOUNTER — OFFICE VISIT (OUTPATIENT)
Age: 74
End: 2024-03-27
Payer: MEDICARE

## 2024-03-27 VITALS
BODY MASS INDEX: 28.49 KG/M2 | WEIGHT: 215 LBS | SYSTOLIC BLOOD PRESSURE: 130 MMHG | HEART RATE: 64 BPM | DIASTOLIC BLOOD PRESSURE: 82 MMHG | HEIGHT: 73 IN

## 2024-03-27 DIAGNOSIS — I25.10 CORONARY ARTERY DISEASE INVOLVING NATIVE CORONARY ARTERY OF NATIVE HEART WITHOUT ANGINA PECTORIS: ICD-10-CM

## 2024-03-27 DIAGNOSIS — E78.00 PURE HYPERCHOLESTEROLEMIA: ICD-10-CM

## 2024-03-27 DIAGNOSIS — I10 PRIMARY HYPERTENSION: Primary | ICD-10-CM

## 2024-03-27 DIAGNOSIS — I34.0 NONRHEUMATIC MITRAL VALVE REGURGITATION: ICD-10-CM

## 2024-03-27 PROCEDURE — 1036F TOBACCO NON-USER: CPT | Performed by: INTERNAL MEDICINE

## 2024-03-27 PROCEDURE — 99214 OFFICE O/P EST MOD 30 MIN: CPT | Performed by: INTERNAL MEDICINE

## 2024-03-27 PROCEDURE — 1123F ACP DISCUSS/DSCN MKR DOCD: CPT | Performed by: INTERNAL MEDICINE

## 2024-03-27 PROCEDURE — G8484 FLU IMMUNIZE NO ADMIN: HCPCS | Performed by: INTERNAL MEDICINE

## 2024-03-27 PROCEDURE — 3017F COLORECTAL CA SCREEN DOC REV: CPT | Performed by: INTERNAL MEDICINE

## 2024-03-27 PROCEDURE — 93000 ELECTROCARDIOGRAM COMPLETE: CPT | Performed by: INTERNAL MEDICINE

## 2024-03-27 PROCEDURE — G8417 CALC BMI ABV UP PARAM F/U: HCPCS | Performed by: INTERNAL MEDICINE

## 2024-03-27 PROCEDURE — 3075F SYST BP GE 130 - 139MM HG: CPT | Performed by: INTERNAL MEDICINE

## 2024-03-27 PROCEDURE — 3079F DIAST BP 80-89 MM HG: CPT | Performed by: INTERNAL MEDICINE

## 2024-03-27 PROCEDURE — G8428 CUR MEDS NOT DOCUMENT: HCPCS | Performed by: INTERNAL MEDICINE

## 2024-03-27 ASSESSMENT — ENCOUNTER SYMPTOMS
BACK PAIN: 0
ABDOMINAL PAIN: 0
COUGH: 0
SHORTNESS OF BREATH: 0

## 2024-03-27 NOTE — PROGRESS NOTES
deficit present.      Mental Status: He is oriented to person, place, and time.           Medical problems, medical history and test results were reviewed with the patient today.      No results found for: \"CHOL\"  No results found for: \"TRIG\"  No results found for: \"HDL\"  No results found for: \"LDLCHOLESTEROL\", \"LDLCALC\"  No results found for: \"VLDL\"  No results found for: \"CHOLHDLRATIO\"     No results found for: \"LABA1C\"  No results found for: \"EAG\"     No results found for: \"NA\", \"K\", \"CL\", \"CO2\", \"BUN\", \"CREATININE\", \"GLUCOSE\", \"CALCIUM\", \"PROT\", \"LABALBU\", \"BILITOT\", \"ALKPHOS\", \"AST\", \"ALT\", \"LABGLOM\", \"GFRAA\", \"AGRATIO\", \"GLOB\"    No results found for: \"NA\", \"K\", \"CL\", \"CO2\", \"BUN\", \"CREATININE\", \"GLUCOSE\", \"CALCIUM\"     No results found for: \"WBC\", \"HGB\", \"HCT\", \"MCV\", \"PLT\"          ASSESSMENT:    David was seen today for follow-up.      Diagnoses and all orders for this visit:      Coronary artery disease of native artery of native heart with stable angina pectoris (HCC) - Medical therapy limited by medication intolerances.  He denies angina and is active.       Essential hypertension, hypertension with unspecified goal - Generally well controlled at home.  This is consistent with white coat hypertension.  He remains resistant to medications.  On amlodipine 5mg daily.         Pure hypercholesterolemia - Not on statin therapy as intolerant.  LDL is 101 03/2024.  On no therapy.  He is committed to diet and exercise.      Primary hypertension - BP well controlled on amlodipine 5mg daily.      Non-rheumatic mitral regurgitation - Mild by echo 09/2023.          Problem List Items Addressed This Visit          Circulatory    Hypertension - Primary    Relevant Orders    EKG 12 Lead (Completed)    Mitral valve regurgitation    CAD (coronary artery disease)       Other    Hyperlipidemia     There are no discontinued medications.            Patient has been instructed and agrees to call our office with any issues or

## 2024-04-29 ENCOUNTER — TELEPHONE (OUTPATIENT)
Dept: ORTHOPEDIC SURGERY | Age: 74
End: 2024-04-29

## 2024-04-29 DIAGNOSIS — M54.17 LUMBOSACRAL RADICULOPATHY: Primary | ICD-10-CM

## 2024-05-01 ENCOUNTER — OFFICE VISIT (OUTPATIENT)
Dept: ORTHOPEDIC SURGERY | Age: 74
End: 2024-05-01
Payer: MEDICARE

## 2024-05-01 DIAGNOSIS — M54.17 LUMBOSACRAL RADICULOPATHY: Primary | ICD-10-CM

## 2024-05-01 PROCEDURE — 62323 NJX INTERLAMINAR LMBR/SAC: CPT | Performed by: PHYSICAL MEDICINE & REHABILITATION

## 2024-05-01 RX ORDER — TRIAMCINOLONE ACETONIDE 40 MG/ML
100 INJECTION, SUSPENSION INTRA-ARTICULAR; INTRAMUSCULAR ONCE
Status: COMPLETED | OUTPATIENT
Start: 2024-05-01 | End: 2024-05-01

## 2024-05-01 RX ADMIN — TRIAMCINOLONE ACETONIDE 100 MG: 40 INJECTION, SUSPENSION INTRA-ARTICULAR; INTRAMUSCULAR at 16:59

## 2024-05-01 NOTE — PROGRESS NOTES
Date: 05/01/24   Name: David Macedo    Pre-Procedural Diagnosis:    Diagnosis Orders   1. Lumbosacral radiculopathy  XR INJ SPINE THER SUBST LUM/SAC W IMG    triamcinolone acetonide (KENALOG-40) injection 100 mg          Procedure: Lumbar Epidural Steroid Injection (FAROOQ)    Precautions: [facility] Precautions spine injections: None.  Patient denies any prior sensitivity to steroid, local anesthetic, contrast dye, iodine or shellfish.    The procedure was discussed at length with the patient and informed consent was signed. The patient was placed in a prone position on the fluoroscopy table and the skin was prepped and draped in a routine sterile fashion. The area to be injected was anesthetized with approximately 5 cc of 1% Lidocaine. Initially a 22-gauge 3.5 inch spinal needle was carefully advanced under fluoroscopic guidance to the left L4-L5 interlaminar epidural space. At this time 0.25 cc of omnipaque administered.. Once proper placement was confirmed, 1.5 cc of sterile water and 100 mg of Kenalog were injected through the spinal needle.     Fluoroscopic guidance was used intermittently over a 10-minute period to insure proper needle placement and patient safety. A hard copy of the fluoroscopic  images has been placed in the patient's chart. The patient was monitored after the procedure and discharged home without complication.     A total of 2.5 cc of Kenalog were administered during this procedure.    Resume Meds:  N/A    L MADHURI RIVERA JR, MD  05/01/24

## 2024-08-19 ENCOUNTER — APPOINTMENT (RX ONLY)
Dept: URBAN - METROPOLITAN AREA CLINIC 24 | Facility: CLINIC | Age: 74
Setting detail: DERMATOLOGY
End: 2024-08-19

## 2024-08-19 DIAGNOSIS — Z85.828 PERSONAL HISTORY OF OTHER MALIGNANT NEOPLASM OF SKIN: ICD-10-CM

## 2024-08-19 DIAGNOSIS — L57.0 ACTINIC KERATOSIS: ICD-10-CM

## 2024-08-19 DIAGNOSIS — L57.8 OTHER SKIN CHANGES DUE TO CHRONIC EXPOSURE TO NONIONIZING RADIATION: ICD-10-CM

## 2024-08-19 DIAGNOSIS — L82.1 OTHER SEBORRHEIC KERATOSIS: ICD-10-CM

## 2024-08-19 DIAGNOSIS — D22 MELANOCYTIC NEVI: ICD-10-CM

## 2024-08-19 DIAGNOSIS — Z87.2 PERSONAL HISTORY OF DISEASES OF THE SKIN AND SUBCUTANEOUS TISSUE: ICD-10-CM

## 2024-08-19 PROBLEM — D22.5 MELANOCYTIC NEVI OF TRUNK: Status: ACTIVE | Noted: 2024-08-19

## 2024-08-19 PROCEDURE — ? COUNSELING

## 2024-08-19 PROCEDURE — ? PRESCRIPTION

## 2024-08-19 PROCEDURE — ? LIQUID NITROGEN

## 2024-08-19 PROCEDURE — 99213 OFFICE O/P EST LOW 20 MIN: CPT | Mod: 25

## 2024-08-19 PROCEDURE — 17004 DESTROY PREMAL LESIONS 15/>: CPT

## 2024-08-19 RX ORDER — FLUOROURACIL 5 MG/G
CREAM TOPICAL
Qty: 40 | Refills: 0

## 2024-08-19 ASSESSMENT — LOCATION DETAILED DESCRIPTION DERM
LOCATION DETAILED: RIGHT SUPERIOR HELIX
LOCATION DETAILED: RIGHT INFERIOR UPPER BACK
LOCATION DETAILED: PERIUMBILICAL SKIN
LOCATION DETAILED: RIGHT MEDIAL INFERIOR CHEST
LOCATION DETAILED: NASAL DORSUM
LOCATION DETAILED: RIGHT SUPERIOR OCCIPITAL SCALP
LOCATION DETAILED: LEFT DISTAL RADIAL DORSAL FOREARM
LOCATION DETAILED: MID-OCCIPITAL SCALP
LOCATION DETAILED: RIGHT MID-UPPER BACK
LOCATION DETAILED: LEFT SUPERIOR FOREHEAD
LOCATION DETAILED: LEFT SUPERIOR UPPER BACK
LOCATION DETAILED: LEFT POSTERIOR SHOULDER
LOCATION DETAILED: POSTERIOR MID-PARIETAL SCALP
LOCATION DETAILED: LEFT CENTRAL FRONTAL SCALP
LOCATION DETAILED: LEFT SUPERIOR PARIETAL SCALP
LOCATION DETAILED: RIGHT CENTRAL TEMPLE
LOCATION DETAILED: RIGHT SUPERIOR LATERAL NECK
LOCATION DETAILED: EPIGASTRIC SKIN
LOCATION DETAILED: RIGHT SUPERIOR PARIETAL SCALP
LOCATION DETAILED: LEFT LATERAL TEMPLE
LOCATION DETAILED: RIGHT MEDIAL TEMPLE
LOCATION DETAILED: LEFT SUPERIOR OCCIPITAL SCALP

## 2024-08-19 ASSESSMENT — LOCATION ZONE DERM
LOCATION ZONE: TRUNK
LOCATION ZONE: SCALP
LOCATION ZONE: EAR
LOCATION ZONE: FACE
LOCATION ZONE: NECK
LOCATION ZONE: NOSE
LOCATION ZONE: ARM

## 2024-08-19 ASSESSMENT — LOCATION SIMPLE DESCRIPTION DERM
LOCATION SIMPLE: RIGHT UPPER BACK
LOCATION SIMPLE: RIGHT EAR
LOCATION SIMPLE: LEFT UPPER BACK
LOCATION SIMPLE: POSTERIOR SCALP
LOCATION SIMPLE: LEFT SHOULDER
LOCATION SIMPLE: RIGHT TEMPLE
LOCATION SIMPLE: CHEST
LOCATION SIMPLE: SCALP
LOCATION SIMPLE: ABDOMEN
LOCATION SIMPLE: LEFT OCCIPITAL SCALP
LOCATION SIMPLE: NOSE
LOCATION SIMPLE: LEFT FOREHEAD
LOCATION SIMPLE: LEFT SCALP
LOCATION SIMPLE: RIGHT OCCIPITAL SCALP
LOCATION SIMPLE: LEFT TEMPLE
LOCATION SIMPLE: LEFT FOREARM
LOCATION SIMPLE: NECK

## 2024-08-20 ENCOUNTER — OFFICE VISIT (OUTPATIENT)
Dept: ORTHOPEDIC SURGERY | Age: 74
End: 2024-08-20
Payer: MEDICARE

## 2024-08-20 DIAGNOSIS — M54.17 LUMBOSACRAL RADICULOPATHY: Primary | ICD-10-CM

## 2024-08-20 DIAGNOSIS — M47.816 LUMBAR SPONDYLOSIS: ICD-10-CM

## 2024-08-20 PROCEDURE — G8428 CUR MEDS NOT DOCUMENT: HCPCS | Performed by: PHYSICAL MEDICINE & REHABILITATION

## 2024-08-20 PROCEDURE — 1036F TOBACCO NON-USER: CPT | Performed by: PHYSICAL MEDICINE & REHABILITATION

## 2024-08-20 PROCEDURE — 3017F COLORECTAL CA SCREEN DOC REV: CPT | Performed by: PHYSICAL MEDICINE & REHABILITATION

## 2024-08-20 PROCEDURE — 1123F ACP DISCUSS/DSCN MKR DOCD: CPT | Performed by: PHYSICAL MEDICINE & REHABILITATION

## 2024-08-20 PROCEDURE — G8417 CALC BMI ABV UP PARAM F/U: HCPCS | Performed by: PHYSICAL MEDICINE & REHABILITATION

## 2024-08-20 PROCEDURE — 99213 OFFICE O/P EST LOW 20 MIN: CPT | Performed by: PHYSICAL MEDICINE & REHABILITATION

## 2024-08-20 PROCEDURE — G2211 COMPLEX E/M VISIT ADD ON: HCPCS | Performed by: PHYSICAL MEDICINE & REHABILITATION

## 2024-08-20 NOTE — PROGRESS NOTES
part of ongoing PAIN care related to patient's single, serious, or complex PAIN condition with the following diagnoses: Lumbar spondylosis and lumbar radiculopathy requiring ongoing therapy, injection therapy with modification as per narrative above.  Also addressing ongoing pain and neurologic status to determine if there is evolution into potential spine surgical issue.     Chronic Pain Condition that is not stable = not at the patient's treatment goal     GM RIVERA JR, MD        11-Jan-2024 19:04

## 2024-08-28 ENCOUNTER — OFFICE VISIT (OUTPATIENT)
Dept: ORTHOPEDIC SURGERY | Age: 74
End: 2024-08-28
Payer: MEDICARE

## 2024-08-28 DIAGNOSIS — M54.17 LUMBOSACRAL RADICULOPATHY: Primary | ICD-10-CM

## 2024-08-28 DIAGNOSIS — M47.816 LUMBAR SPONDYLOSIS: ICD-10-CM

## 2024-08-28 PROCEDURE — 62323 NJX INTERLAMINAR LMBR/SAC: CPT | Performed by: PHYSICAL MEDICINE & REHABILITATION

## 2024-08-28 PROCEDURE — 64493 INJ PARAVERT F JNT L/S 1 LEV: CPT | Performed by: PHYSICAL MEDICINE & REHABILITATION

## 2024-08-28 RX ORDER — TRIAMCINOLONE ACETONIDE 40 MG/ML
220 INJECTION, SUSPENSION INTRA-ARTICULAR; INTRAMUSCULAR ONCE
Status: COMPLETED | OUTPATIENT
Start: 2024-08-28 | End: 2024-08-28

## 2024-08-28 RX ADMIN — TRIAMCINOLONE ACETONIDE 220 MG: 40 INJECTION, SUSPENSION INTRA-ARTICULAR; INTRAMUSCULAR at 10:11

## 2024-08-28 NOTE — PROGRESS NOTES
Date: 08/28/24   Name: David Macedo    Pre-Procedural Diagnosis:    Diagnosis Orders   1. Lumbosacral radiculopathy  XR INJ SPINE THER SUBST LUM/SAC W IMG    FL INJ LUMB/SAC FACET SINGLE LEVEL    triamcinolone acetonide (KENALOG-40) injection 220 mg      2. Lumbar spondylosis  XR INJ SPINE THER SUBST LUM/SAC W IMG    FL INJ LUMB/SAC FACET SINGLE LEVEL    triamcinolone acetonide (KENALOG-40) injection 220 mg          Procedure: Lumbar Epidural Steroid Injection (FAROOQ) with facet injections    I have reviewed prior lumbar spine radiographs that reveal 5 non rib-bearing lumbar vertebrae. and I have personally reviewed with patient the informed consent for operation/procedure per [facility] protocol.  This involves risks and benefits of procedure, potential for success/improvement of injections,qualifications of physician performing procedure.  Consent form addressed appropriate local anesthesia, emergent blood transfusion, clarification of DNR status.  This form was signed by all appropriate parties and scanned into the medical record. Note that is not appropriate for me to discuss spine surgical issues or other treatment options if I am not the primary clinician.  If I am the ordering clinician, those issues would have been discussed at the appropriate office visit or at upcoming encounter.     Precautions:  [facility] Precautions spine injections: None.  Patient denies any prior sensitivity to steroid, local anesthetic, contrast dye, iodine or shellfish.    The procedure was discussed at length with the patient and informed consent was signed. The patient was placed in a prone position on the fluoroscopy table and the skin was prepped and draped in a routine sterile fashion. The areas to be injected were each anesthetized with approximately 5 cc of 1% Lidocaine. Initially a 22-gauge 3.5 inch spinal needle was carefully advanced under fluoroscopic guidance to the left L4-L5 epidural space. At this time 0.25 cc of  omnipaque administered.. Once proper placement was confirmed,1.5 cc of sterile water and 100 mg of Kenalog were injected through the spinal needle.     The areas to be injected were each anesthetized with approximately 5cc of 1% Lidocaine. Initially a 22-gauge 3.5 inch spinal needle was carefully advanced under fluoroscopic guidance to the bilateral L5-S1 facet joint space. Once proper placement was confirmed, 1 cc of 0.25% Marcaine and 60 mg of Kenalog were injected through the spinal needle at each site.     Fluoroscopic guidance was used intermittently over a 10-minute period to insure proper needle placement and patient safety. A hard copy of the fluoroscopic  images has been placed in the patient's chart. The patient was monitored after the procedure and discharged home without complication.     A total of 5.5 cc of Kenalog were administered during this procedure.    Resume Meds:  N/A    GM RIVERA JR, MD  08/28/24   yes

## 2024-09-16 ENCOUNTER — OFFICE VISIT (OUTPATIENT)
Age: 74
End: 2024-09-16
Payer: MEDICARE

## 2024-09-16 VITALS
HEIGHT: 73 IN | BODY MASS INDEX: 27.99 KG/M2 | DIASTOLIC BLOOD PRESSURE: 84 MMHG | WEIGHT: 211.2 LBS | SYSTOLIC BLOOD PRESSURE: 138 MMHG | HEART RATE: 68 BPM

## 2024-09-16 DIAGNOSIS — I25.10 CORONARY ARTERY DISEASE INVOLVING NATIVE CORONARY ARTERY OF NATIVE HEART WITHOUT ANGINA PECTORIS: Primary | ICD-10-CM

## 2024-09-16 DIAGNOSIS — E78.00 PURE HYPERCHOLESTEROLEMIA: ICD-10-CM

## 2024-09-16 DIAGNOSIS — I10 PRIMARY HYPERTENSION: ICD-10-CM

## 2024-09-16 DIAGNOSIS — I34.0 NONRHEUMATIC MITRAL VALVE REGURGITATION: ICD-10-CM

## 2024-09-16 PROCEDURE — 99214 OFFICE O/P EST MOD 30 MIN: CPT | Performed by: INTERNAL MEDICINE

## 2024-09-16 PROCEDURE — 1036F TOBACCO NON-USER: CPT | Performed by: INTERNAL MEDICINE

## 2024-09-16 PROCEDURE — 3079F DIAST BP 80-89 MM HG: CPT | Performed by: INTERNAL MEDICINE

## 2024-09-16 PROCEDURE — G8428 CUR MEDS NOT DOCUMENT: HCPCS | Performed by: INTERNAL MEDICINE

## 2024-09-16 PROCEDURE — 3075F SYST BP GE 130 - 139MM HG: CPT | Performed by: INTERNAL MEDICINE

## 2024-09-16 PROCEDURE — G8417 CALC BMI ABV UP PARAM F/U: HCPCS | Performed by: INTERNAL MEDICINE

## 2024-09-16 PROCEDURE — 1123F ACP DISCUSS/DSCN MKR DOCD: CPT | Performed by: INTERNAL MEDICINE

## 2024-09-16 RX ORDER — AMLODIPINE BESYLATE 5 MG/1
5 TABLET ORAL DAILY
Qty: 90 TABLET | Refills: 3 | Status: SHIPPED | OUTPATIENT
Start: 2024-09-16

## 2024-09-16 RX ORDER — NITROGLYCERIN 0.4 MG/1
0.4 TABLET SUBLINGUAL EVERY 5 MIN PRN
Qty: 25 TABLET | Refills: 1 | Status: SHIPPED | OUTPATIENT
Start: 2024-09-16

## 2024-09-16 RX ORDER — ROSUVASTATIN CALCIUM 5 MG/1
5 TABLET, COATED ORAL DAILY
Qty: 30 TABLET | Refills: 11 | Status: SHIPPED | OUTPATIENT
Start: 2024-09-16

## 2024-09-16 ASSESSMENT — ENCOUNTER SYMPTOMS
ABDOMINAL PAIN: 0
COUGH: 0
BACK PAIN: 0
SHORTNESS OF BREATH: 0

## 2025-02-24 ENCOUNTER — OFFICE VISIT (OUTPATIENT)
Dept: ORTHOPEDIC SURGERY | Age: 75
End: 2025-02-24

## 2025-02-24 DIAGNOSIS — M75.21 BICEPS TENDINITIS OF RIGHT SHOULDER: Primary | ICD-10-CM

## 2025-02-24 RX ORDER — TRIAMCINOLONE ACETONIDE 40 MG/ML
40 INJECTION, SUSPENSION INTRA-ARTICULAR; INTRAMUSCULAR ONCE
Status: COMPLETED | OUTPATIENT
Start: 2025-02-24 | End: 2025-02-24

## 2025-02-24 RX ADMIN — TRIAMCINOLONE ACETONIDE 40 MG: 40 INJECTION, SUSPENSION INTRA-ARTICULAR; INTRAMUSCULAR at 11:14

## 2025-02-24 NOTE — PROGRESS NOTES
Name: David Macedo  YOB: 1950  Gender: male  MRN: [id number]      CC: Shoulder Pain (R)       HPI: David Macedo is a 74 y.o. male who returns for follow up on  right shoulder. He had a bicep tendon injection in January 2024 and he was completely pain free until around January 2025. He started working out a gym with a new . Around the same time, he started a new statin medication from his cardiologist and had a reaction that effected the right shoulder joint. He is completely off the medication at this point but his pain is still present. He really notice it with activity.   History of Present Illness  The patient presents for a reevaluation of shoulder pain.    He had been faring well until recently when he experienced a flare-up of his shoulder pain. He has been attempting to alleviate the discomfort through stretching exercises but reports limited success. He also notes that slow movements do not exacerbate the pain, whereas rapid movements trigger it. He has been using resistance bands as part of his exercise regimen. He reports experiencing more discomfort in the back of his shoulder than in the front. He received a biceps tendon sheath injection approximately one year ago, which provided significant relief. However, he discontinued the use of statins due to an adverse reaction in his right shoulder. Concurrently, he resumed gym activities under the supervision of a , who was informed about his shoulder condition. His cardiologist had recommended resuming physical exercise and statin therapy.    ALLERGIES  The patient has a bad reaction to STATINS in his right shoulder.  Causing soreness    MEDICATIONS  Discontinued: statin         Physical Examination:  General: no acute distress  Lungs: breathing easily  CV: regular rhythm by pulse  Right Shoulder: Tenderness palpation of the biceps anteriorly maintains full passive range of motion he has pain with Lynne and Neer

## 2025-03-20 ENCOUNTER — OFFICE VISIT (OUTPATIENT)
Age: 75
End: 2025-03-20

## 2025-03-20 VITALS
HEIGHT: 73 IN | BODY MASS INDEX: 29.03 KG/M2 | HEART RATE: 66 BPM | DIASTOLIC BLOOD PRESSURE: 88 MMHG | WEIGHT: 219 LBS | SYSTOLIC BLOOD PRESSURE: 136 MMHG

## 2025-03-20 DIAGNOSIS — I34.0 NONRHEUMATIC MITRAL VALVE REGURGITATION: ICD-10-CM

## 2025-03-20 DIAGNOSIS — I10 PRIMARY HYPERTENSION: Primary | ICD-10-CM

## 2025-03-20 DIAGNOSIS — E78.00 PURE HYPERCHOLESTEROLEMIA: ICD-10-CM

## 2025-03-20 DIAGNOSIS — I25.10 CORONARY ARTERY DISEASE INVOLVING NATIVE CORONARY ARTERY OF NATIVE HEART WITHOUT ANGINA PECTORIS: ICD-10-CM

## 2025-03-20 ASSESSMENT — ENCOUNTER SYMPTOMS
SHORTNESS OF BREATH: 0
ABDOMINAL PAIN: 0
BACK PAIN: 0
COUGH: 0

## 2025-03-20 NOTE — PROGRESS NOTES
[facility] CARDIOLOGY  [address] [place] [address], [address]  [place] [address]      25      NAME:  David Macedo  : 1950  MRN: [id number]      SUBJECTIVE:   David Macedo is a 74 y.o. male seen for a follow up visit regarding the following:     Chief Complaint   Patient presents with    Hypertension    Coronary Artery Disease    Hyperlipidemia       HPI:    74 y.o. male denies any angina or CHF.  Patient is intolerant of most medications. He states his BP is well controlled at home.  He remains active.            Past Medical History, Past Surgical History, Family history, Social History, and Medications were all reviewed with the patient today and updated as necessary.     Current Outpatient Medications   Medication Sig Dispense Refill    amLODIPine (NORVASC) 5 MG tablet Take 1 tablet by mouth daily 90 tablet 3    nitroGLYCERIN (NITROSTAT) 0.4 MG SL tablet Place 1 tablet under the tongue every 5 minutes as needed for Chest pain 25 tablet 1    Magnesium 400 MG TABS Take by mouth      Multiple Vitamins-Minerals (PRESERVISION AREDS 2 PO) Take by mouth      acetaminophen (TYLENOL) 325 MG tablet Take 1 tablet by mouth every 4 hours as needed      aspirin 81 MG EC tablet Take by mouth daily      Cetirizine HCl 10 MG CAPS Take by mouth      chlorpheniramine (CHLOR-TRIMETON) 4 MG tablet Take 1 tablet by mouth every 6 hours as needed      coenzyme Q10 100 MG CAPS capsule Take 1 capsule by mouth daily      triamcinolone (NASACORT) 55 MCG/ACT nasal inhaler 1 puff in each nostril       No current facility-administered medications for this visit.     Patient Active Problem List    Diagnosis Date Noted    Osteoarthritis of left knee 2018     Priority: Low    Status post left knee replacement 2018     Priority: Low    Osteoarthritis 2018     Priority: Low    Hyponatremia 2018     Priority: Low    Varicose veins of both lower extremities 2016     Priority: Low    Hyperlipidemia

## 2025-04-04 ENCOUNTER — TELEPHONE (OUTPATIENT)
Dept: ORTHOPEDIC SURGERY | Age: 75
End: 2025-04-04

## 2025-04-04 DIAGNOSIS — M54.17 LUMBOSACRAL RADICULOPATHY: Primary | ICD-10-CM

## 2025-04-04 DIAGNOSIS — M47.816 LUMBAR SPONDYLOSIS: ICD-10-CM

## 2025-04-14 ENCOUNTER — OFFICE VISIT (OUTPATIENT)
Dept: ORTHOPEDIC SURGERY | Age: 75
End: 2025-04-14
Payer: MEDICARE

## 2025-04-14 DIAGNOSIS — M47.816 LUMBAR SPONDYLOSIS: ICD-10-CM

## 2025-04-14 DIAGNOSIS — M54.17 LUMBOSACRAL RADICULOPATHY: Primary | ICD-10-CM

## 2025-04-14 PROCEDURE — 64493 INJ PARAVERT F JNT L/S 1 LEV: CPT | Performed by: PHYSICAL MEDICINE & REHABILITATION

## 2025-04-14 PROCEDURE — 62323 NJX INTERLAMINAR LMBR/SAC: CPT | Performed by: PHYSICAL MEDICINE & REHABILITATION

## 2025-04-14 RX ORDER — TRIAMCINOLONE ACETONIDE 40 MG/ML
40 INJECTION, SUSPENSION INTRA-ARTICULAR; INTRAMUSCULAR ONCE
Status: COMPLETED | OUTPATIENT
Start: 2025-04-14 | End: 2025-04-14

## 2025-04-14 RX ADMIN — TRIAMCINOLONE ACETONIDE 40 MG: 40 INJECTION, SUSPENSION INTRA-ARTICULAR; INTRAMUSCULAR at 15:32

## 2025-04-14 NOTE — PROGRESS NOTES
Date: 04/14/25   Name: David Macedo    Pre-Procedural Diagnosis:    Diagnosis Orders   1. Lumbosacral radiculopathy  XR INJ SPINE THER SUBST LUM/SAC W IMG    FL INJ LUMB/SAC FACET SINGLE LEVEL    triamcinolone acetonide (KENALOG-40) injection 40 mg      2. Lumbar spondylosis  XR INJ SPINE THER SUBST LUM/SAC W IMG    FL INJ LUMB/SAC FACET SINGLE LEVEL    triamcinolone acetonide (KENALOG-40) injection 40 mg          Procedure: Lumbar Epidural Steroid Injection (FAROOQ) with facet injections    I have reviewed prior lumbar spine radiographs that reveal 5 non rib-bearing lumbar vertebrae. and I have personally reviewed with patient the informed consent for operation/procedure per [facility] protocol.  This involves risks and benefits of procedure, potential for success/improvement of injections,qualifications of physician performing procedure.  Consent form addressed appropriate local anesthesia.  This form was signed by all appropriate parties and scanned into the medical record. Note that is not appropriate for me to discuss spine surgical issues or other treatment options if I am not the primary clinician.  If I am the ordering clinician, those issues would have been discussed at the appropriate office visit or at upcoming encounter.     Precautions:  [facility] Precautions spine injections: None.  Patient denies any prior sensitivity to steroid, local anesthetic, contrast dye, iodine or shellfish.    The procedure was discussed at length with the patient and informed consent was signed. The patient was placed in a prone position on the fluoroscopy table and the skin was prepped and draped in a routine sterile fashion. The areas to be injected were each anesthetized with approximately 5 cc of 1% Lidocaine. Initially a 22-gauge 3.5 inch spinal needle was carefully advanced under fluoroscopic guidance to the left L4-L5 epidural space. At this time 0.25 cc of omnipaque administered.. Once proper placement was confirmed,1.5 cc

## 2025-06-03 ENCOUNTER — OFFICE VISIT (OUTPATIENT)
Dept: ORTHOPEDIC SURGERY | Age: 75
End: 2025-06-03
Payer: MEDICARE

## 2025-06-03 DIAGNOSIS — M79.18 BUTTOCK PAIN: ICD-10-CM

## 2025-06-03 DIAGNOSIS — M25.551 RIGHT HIP PAIN: Primary | ICD-10-CM

## 2025-06-03 DIAGNOSIS — Z96.652 STATUS POST LEFT KNEE REPLACEMENT: ICD-10-CM

## 2025-06-03 DIAGNOSIS — M47.816 LUMBAR SPONDYLOSIS: ICD-10-CM

## 2025-06-03 PROCEDURE — G8417 CALC BMI ABV UP PARAM F/U: HCPCS | Performed by: ORTHOPAEDIC SURGERY

## 2025-06-03 PROCEDURE — 1160F RVW MEDS BY RX/DR IN RCRD: CPT | Performed by: ORTHOPAEDIC SURGERY

## 2025-06-03 PROCEDURE — G8427 DOCREV CUR MEDS BY ELIG CLIN: HCPCS | Performed by: ORTHOPAEDIC SURGERY

## 2025-06-03 PROCEDURE — 1159F MED LIST DOCD IN RCRD: CPT | Performed by: ORTHOPAEDIC SURGERY

## 2025-06-03 PROCEDURE — 1036F TOBACCO NON-USER: CPT | Performed by: ORTHOPAEDIC SURGERY

## 2025-06-03 PROCEDURE — 1123F ACP DISCUSS/DSCN MKR DOCD: CPT | Performed by: ORTHOPAEDIC SURGERY

## 2025-06-03 PROCEDURE — 99203 OFFICE O/P NEW LOW 30 MIN: CPT | Performed by: ORTHOPAEDIC SURGERY

## 2025-06-03 NOTE — PROGRESS NOTES
Name: David Macedo  YOB: 1950  Gender: male  MRN: [id number]    CC:   Chief Complaint   Patient presents with    Hip Pain     Rt hip pain    Follow-up     Hoda lt tka         HPI: David Macedo is a 74 y.o. male with a  has a past medical history of Arthritis, CAD (coronary artery disease), Chest pain, unspecified, Environmental and seasonal allergies, H/O echocardiogram, Hyperlipidemia, Hypertension, Mitral valve regurgitation, NSTEMI (non-ST elevated myocardial infarction) ([facility]), Post PTCA, Status post left knee replacement, Varicose veins of both lower extremities, and Varicose veins of legs. here for evaluation of right hip pain  There was not an acute injury  Regarding the hip pain, it has been present for months and is becoming worse. The pain is primarily lateral, over greater trochanter and posterior buttock area  he describes the pain as aching  The pain is worse with walking and lying on the right side  The pain does not radiate down the leg.  he denies numbness and tingling down the leg.   Treatment so far has been prescription and OTC NSAIDS which have not effectively relieved pain/inflammation and Tylenol with relief.  He does see our spine team and has had injection therapy with Dr. Lubin in the past        Review of Systems  As per HPI.  Pertinent positives and negatives are addressed with the patient, particularly those related to musculoskeletal concerns.  Non-orthopaedic concerns were referred back to the primary care physician.      Allergies   Allergen Reactions    Lisinopril Other (See Comments)    Losartan Other (See Comments)     Patient unsure of reaction     Statins Myalgia                    PHYSICAL EXAMINATION:   The patient is alert and oriented, in no distress.  There were no vitals filed for this visit.       The gait is noted to be with a slight trendelenburg and antalgia  There is mild tenderness to palpation along the spinous processes and paraspinal musculature.

## 2025-06-13 ENCOUNTER — OFFICE VISIT (OUTPATIENT)
Age: 75
End: 2025-06-13
Payer: MEDICARE

## 2025-06-13 VITALS — WEIGHT: 217 LBS | BODY MASS INDEX: 29.39 KG/M2 | HEIGHT: 72 IN

## 2025-06-13 DIAGNOSIS — M54.16 LUMBAR RADICULOPATHY: ICD-10-CM

## 2025-06-13 DIAGNOSIS — M84.48XA SACRAL INSUFFICIENCY FRACTURE, INITIAL ENCOUNTER: Primary | ICD-10-CM

## 2025-06-13 PROCEDURE — 99214 OFFICE O/P EST MOD 30 MIN: CPT | Performed by: ORTHOPAEDIC SURGERY

## 2025-06-13 PROCEDURE — G8428 CUR MEDS NOT DOCUMENT: HCPCS | Performed by: ORTHOPAEDIC SURGERY

## 2025-06-13 PROCEDURE — 1036F TOBACCO NON-USER: CPT | Performed by: ORTHOPAEDIC SURGERY

## 2025-06-13 PROCEDURE — G8417 CALC BMI ABV UP PARAM F/U: HCPCS | Performed by: ORTHOPAEDIC SURGERY

## 2025-06-13 PROCEDURE — 1123F ACP DISCUSS/DSCN MKR DOCD: CPT | Performed by: ORTHOPAEDIC SURGERY

## 2025-06-13 NOTE — PROGRESS NOTES
Name: David Macedo  YOB: 1950  Gender: male  MRN: [id number]  Age: 74 y.o.    Chief Complaint: Low back pain    History of Present Illness:      This is a very pleasant 74 y.o. male who has been under the care of Dr. Lubin for intermittent chronic right sided back pain and occasional radicular symptoms.  He had been getting injections by Dr. Lubin.  He had one in August of last year that helped for about 6 months.  He had a more recent one in April which seem to be helping and then he fell in a plane and everything seemed to get much worse.  Now, he has pelvic and right hip pain and some pain and paresthesias in the right ankle.  It has affected his gait.  He did see Dr. Lofton who ruled out intra-articular hip and knee pathology.  An MRI of the lumbar spine has been obtained.              Medications:  Prior to Visit Medications    Medication Sig Taking? Authorizing Provider   amLODIPine (NORVASC) 5 MG tablet Take 1 tablet by mouth daily Yes Franck Cordova MD   nitroGLYCERIN (NITROSTAT) 0.4 MG SL tablet Place 1 tablet under the tongue every 5 minutes as needed for Chest pain Yes Franck Cordova MD   Magnesium 400 MG TABS Take by mouth Yes ProviderCandice MD   Multiple Vitamins-Minerals (PRESERVISION AREDS 2 PO) Take by mouth Yes ProviderCandice MD   acetaminophen (TYLENOL) 325 MG tablet Take 1 tablet by mouth every 4 hours as needed Yes Automatic Reconciliation, Ar   aspirin 81 MG EC tablet Take by mouth daily Yes Automatic Reconciliation, Ar   Cetirizine HCl 10 MG CAPS Take by mouth Yes Automatic Reconciliation, Ar   chlorpheniramine (CHLOR-TRIMETON) 4 MG tablet Take 1 tablet by mouth every 6 hours as needed Yes Automatic Reconciliation, Ar   coenzyme Q10 100 MG CAPS capsule Take 1 capsule by mouth daily Yes Automatic Reconciliation, Ar   triamcinolone (NASACORT) 55 MCG/ACT nasal inhaler 1 puff in each nostril Yes Automatic Reconciliation, Ar

## 2025-07-15 ENCOUNTER — TELEPHONE (OUTPATIENT)
Dept: ORTHOPEDIC SURGERY | Age: 75
End: 2025-07-15

## 2025-07-15 DIAGNOSIS — M54.16 LUMBAR RADICULOPATHY: Primary | ICD-10-CM

## 2025-07-15 DIAGNOSIS — M47.816 LUMBAR SPONDYLOSIS: ICD-10-CM

## 2025-07-15 NOTE — TELEPHONE ENCOUNTER
----- Message from Cheli PRIETO sent at 7/15/2025 11:22 AM EDT -----  Regarding: stuart Specialty Message to Provider  Specialty Message to Provider    Relationship to Patient: Self     Patient Message: patient would like to schedule an injection   --------------------------------------------------------------------------------------------------------------------------    Call Back Information: OK to leave message on voicemail  Preferred Call Back Number: Phone [phone]

## 2025-07-22 ENCOUNTER — OFFICE VISIT (OUTPATIENT)
Dept: ORTHOPEDIC SURGERY | Age: 75
End: 2025-07-22
Payer: MEDICARE

## 2025-07-22 DIAGNOSIS — M47.816 LUMBAR SPONDYLOSIS: Primary | ICD-10-CM

## 2025-07-22 DIAGNOSIS — M54.16 LUMBAR RADICULOPATHY: ICD-10-CM

## 2025-07-22 PROCEDURE — 64493 INJ PARAVERT F JNT L/S 1 LEV: CPT | Performed by: PHYSICAL MEDICINE & REHABILITATION

## 2025-07-22 PROCEDURE — 62323 NJX INTERLAMINAR LMBR/SAC: CPT | Performed by: PHYSICAL MEDICINE & REHABILITATION

## 2025-07-22 RX ORDER — TRIAMCINOLONE ACETONIDE 40 MG/ML
40 INJECTION, SUSPENSION INTRA-ARTICULAR; INTRAMUSCULAR ONCE
Status: COMPLETED | OUTPATIENT
Start: 2025-07-22 | End: 2025-07-22

## 2025-07-22 RX ADMIN — TRIAMCINOLONE ACETONIDE 40 MG: 40 INJECTION, SUSPENSION INTRA-ARTICULAR; INTRAMUSCULAR at 08:48

## 2025-07-22 NOTE — PROGRESS NOTES
Date: 07/22/25   Name: David Macedo    Pre-Procedural Diagnosis:    Diagnosis Orders   1. Lumbar spondylosis  XR INJ SPINE THER SUBST LUM/SAC W IMG    FL INJ LUMB/SAC FACET SINGLE LEVEL    triamcinolone acetonide (KENALOG-40) injection 40 mg      2. Lumbar radiculopathy  XR INJ SPINE THER SUBST LUM/SAC W IMG    FL INJ LUMB/SAC FACET SINGLE LEVEL    triamcinolone acetonide (KENALOG-40) injection 40 mg          Procedure: Lumbar Epidural Steroid Injection (FAROOQ) with facet injections    I have reviewed prior lumbar spine radiographs that reveal 5 non rib-bearing lumbar vertebrae. and I have personally reviewed with patient the informed consent for operation/procedure per [facility] protocol.  This involves risks and benefits of procedure, potential for success/improvement of injections,qualifications of physician performing procedure.  Consent form addressed appropriate local anesthesia.  This form was signed by all appropriate parties and scanned into the medical record. Note that is not appropriate for me to discuss spine surgical issues or other treatment options if I am not the primary clinician.  If I am the ordering clinician, those issues would have been discussed at the appropriate office visit or at upcoming encounter.     Precautions:  [facility] Precautions spine injections: None.  Patient denies any prior sensitivity to steroid, local anesthetic, contrast dye, iodine or shellfish.    The procedure was discussed at length with the patient and informed consent was signed. The patient was placed in a prone position on the fluoroscopy table and the skin was prepped and draped in a routine sterile fashion. The areas to be injected were each anesthetized with approximately 5 cc of 1% Lidocaine. Initially a 22-gauge 3.5 inch spinal needle was carefully advanced under fluoroscopic guidance to the left L4-L5 epidural space. At this time 0.25 cc of omnipaque administered.. Once proper placement was confirmed,1.5 cc of

## 2025-08-04 SDOH — HEALTH STABILITY: PHYSICAL HEALTH
ON AVERAGE, HOW MANY DAYS PER WEEK DO YOU ENGAGE IN MODERATE TO STRENUOUS EXERCISE (LIKE A BRISK WALK)?: PATIENT DECLINED

## 2025-08-07 ENCOUNTER — OFFICE VISIT (OUTPATIENT)
Dept: FAMILY MEDICINE CLINIC | Facility: CLINIC | Age: 75
End: 2025-08-07
Payer: MEDICARE

## 2025-08-07 VITALS
OXYGEN SATURATION: 98 % | TEMPERATURE: 97 F | SYSTOLIC BLOOD PRESSURE: 128 MMHG | BODY MASS INDEX: 29.53 KG/M2 | DIASTOLIC BLOOD PRESSURE: 80 MMHG | WEIGHT: 218 LBS | HEART RATE: 78 BPM | HEIGHT: 72 IN | RESPIRATION RATE: 18 BRPM

## 2025-08-07 DIAGNOSIS — U09.9 POST-ACUTE SEQUELAE OF COVID-19 (PASC): ICD-10-CM

## 2025-08-07 DIAGNOSIS — R06.02 SHORTNESS OF BREATH: Primary | ICD-10-CM

## 2025-08-07 PROCEDURE — 3074F SYST BP LT 130 MM HG: CPT | Performed by: FAMILY MEDICINE

## 2025-08-07 PROCEDURE — 3079F DIAST BP 80-89 MM HG: CPT | Performed by: FAMILY MEDICINE

## 2025-08-07 PROCEDURE — 1123F ACP DISCUSS/DSCN MKR DOCD: CPT | Performed by: FAMILY MEDICINE

## 2025-08-07 PROCEDURE — G8428 CUR MEDS NOT DOCUMENT: HCPCS | Performed by: FAMILY MEDICINE

## 2025-08-07 PROCEDURE — G8417 CALC BMI ABV UP PARAM F/U: HCPCS | Performed by: FAMILY MEDICINE

## 2025-08-07 PROCEDURE — 99214 OFFICE O/P EST MOD 30 MIN: CPT | Performed by: FAMILY MEDICINE

## 2025-08-07 PROCEDURE — 1036F TOBACCO NON-USER: CPT | Performed by: FAMILY MEDICINE

## 2025-08-07 RX ORDER — FLUTICASONE PROPIONATE 50 MCG
1 SPRAY, SUSPENSION (ML) NASAL DAILY
COMMUNITY

## 2025-08-07 RX ORDER — FLUOROURACIL 50 MG/G
CREAM TOPICAL
COMMUNITY
Start: 2024-12-11

## 2025-08-07 SDOH — ECONOMIC STABILITY: FOOD INSECURITY: WITHIN THE PAST 12 MONTHS, THE FOOD YOU BOUGHT JUST DIDN'T LAST AND YOU DIDN'T HAVE MONEY TO GET MORE.: NEVER TRUE

## 2025-08-07 SDOH — ECONOMIC STABILITY: FOOD INSECURITY: WITHIN THE PAST 12 MONTHS, YOU WORRIED THAT YOUR FOOD WOULD RUN OUT BEFORE YOU GOT MONEY TO BUY MORE.: NEVER TRUE

## 2025-08-07 ASSESSMENT — PATIENT HEALTH QUESTIONNAIRE - PHQ9
SUM OF ALL RESPONSES TO PHQ QUESTIONS 1-9: 0
2. FEELING DOWN, DEPRESSED OR HOPELESS: NOT AT ALL
SUM OF ALL RESPONSES TO PHQ QUESTIONS 1-9: 0
SUM OF ALL RESPONSES TO PHQ QUESTIONS 1-9: 0
1. LITTLE INTEREST OR PLEASURE IN DOING THINGS: NOT AT ALL
SUM OF ALL RESPONSES TO PHQ QUESTIONS 1-9: 0

## 2025-08-21 ENCOUNTER — OFFICE VISIT (OUTPATIENT)
Dept: ORTHOPEDIC SURGERY | Age: 75
End: 2025-08-21
Payer: MEDICARE

## 2025-08-21 DIAGNOSIS — M54.16 LUMBAR RADICULOPATHY: ICD-10-CM

## 2025-08-21 DIAGNOSIS — M47.816 SPONDYLOSIS OF LUMBAR REGION WITHOUT MYELOPATHY OR RADICULOPATHY: ICD-10-CM

## 2025-08-21 DIAGNOSIS — M48.061 SPINAL STENOSIS OF LUMBAR REGION WITHOUT NEUROGENIC CLAUDICATION: Primary | ICD-10-CM

## 2025-08-21 PROCEDURE — G8428 CUR MEDS NOT DOCUMENT: HCPCS | Performed by: PHYSICAL MEDICINE & REHABILITATION

## 2025-08-21 PROCEDURE — 1123F ACP DISCUSS/DSCN MKR DOCD: CPT | Performed by: PHYSICAL MEDICINE & REHABILITATION

## 2025-08-21 PROCEDURE — G8417 CALC BMI ABV UP PARAM F/U: HCPCS | Performed by: PHYSICAL MEDICINE & REHABILITATION

## 2025-08-21 PROCEDURE — 99214 OFFICE O/P EST MOD 30 MIN: CPT | Performed by: PHYSICAL MEDICINE & REHABILITATION

## 2025-08-21 PROCEDURE — 1036F TOBACCO NON-USER: CPT | Performed by: PHYSICAL MEDICINE & REHABILITATION

## 2025-08-21 PROCEDURE — G2211 COMPLEX E/M VISIT ADD ON: HCPCS | Performed by: PHYSICAL MEDICINE & REHABILITATION

## (undated) DEVICE — SIZER SURG TIB KT DISP TRIATHLON PRECIS

## (undated) DEVICE — Device

## (undated) DEVICE — TRAY CATH 16F DRN BG LTX -- CONVERT TO ITEM 363158

## (undated) DEVICE — DRAPE,TOP,102X53,STERILE: Brand: MEDLINE

## (undated) DEVICE — 3M™ STERI-DRAPE™ INSTRUMENT POUCH 1018: Brand: STERI-DRAPE™

## (undated) DEVICE — TRAY PREP DRY W/ PREM GLV 2 APPL 6 SPNG 2 UNDPD 1 OVERWRAP

## (undated) DEVICE — Z DISCONTINUED USE 2744636  DRESSING AQUACEL 14 IN ALG W3.5XL14IN POLYUR FLM CVR W/ HYDRCOLL

## (undated) DEVICE — SUTURE VCRL SZ 1 L27IN ABSRB UD L36MM CP-1 1/2 CIR REV CUT J268H

## (undated) DEVICE — BIPOLAR SEALER 23-112-1 AQM 6.0: Brand: AQUAMANTYS ®

## (undated) DEVICE — BANDAGE COMPR SELF ADH 5 YDX4 IN TAN STRL PREMIERPRO LF

## (undated) DEVICE — SUTURE ABSRB X-1 REV CUT 1/2 CIR 22MM UD BRAID 27IN SZ 3-0 J458H

## (undated) DEVICE — SYR 10ML LUER LOK 1/5ML GRAD --

## (undated) DEVICE — SOLUTION IV 1000ML 0.9% SOD CHL

## (undated) DEVICE — SUTURE STRATAFIX SYMMETRIC PDS + SZ 2-0 L18IN ABSRB VLT SXPP1A403

## (undated) DEVICE — FAN SPRAY KIT: Brand: PULSAVAC®

## (undated) DEVICE — SYR 50ML LR LCK 1ML GRAD NSAF --

## (undated) DEVICE — SYR LR LCK 1ML GRAD NSAF 30ML --

## (undated) DEVICE — REM POLYHESIVE ADULT PATIENT RETURN ELECTRODE: Brand: VALLEYLAB

## (undated) DEVICE — SYRINGE CATH TIP 50ML

## (undated) DEVICE — MEDI-VAC YANKAUER SUCTION HANDLE W/BULBOUS TIP: Brand: CARDINAL HEALTH

## (undated) DEVICE — SOLUTION IRRIG 3000ML 0.9% SOD CHL FLX CONT 0797208] ICU MEDICAL INC]

## (undated) DEVICE — (D)PREP SKN CHLRAPRP APPL 26ML -- CONVERT TO ITEM 371833

## (undated) DEVICE — T4 HOOD

## (undated) DEVICE — SUTURE PDS II SZ 1 L96IN ABSRB VLT TP-1 L65MM 1/2 CIR Z880G

## (undated) DEVICE — SOLUTION IV DEXTROSE/SALINE 5%-0.9% 500ML - 500ML

## (undated) DEVICE — PACK PROCEDURE SURG TOT KNEE

## (undated) DEVICE — 2000CC GUARDIAN II: Brand: GUARDIAN

## (undated) DEVICE — BLADE SAW PAT RMR PILT H 51MM --

## (undated) DEVICE — 3000CC GUARDIAN II: Brand: GUARDIAN

## (undated) DEVICE — SLIM BODY SKIN STAPLER: Brand: APPOSE ULC

## (undated) DEVICE — SUT ETHBND 2 30IN LR DA GRN --

## (undated) DEVICE — X-LARGE COTTON GLOVE: Brand: DEROYAL

## (undated) DEVICE — OSCILLATING TIP SAW CARTRIDGE: Brand: STRYKER PRECISION

## (undated) DEVICE — BUTTON SWITCH PENCIL BLADE ELECTRODE, HOLSTER: Brand: EDGE